# Patient Record
Sex: FEMALE | Race: OTHER | NOT HISPANIC OR LATINO | URBAN - METROPOLITAN AREA
[De-identification: names, ages, dates, MRNs, and addresses within clinical notes are randomized per-mention and may not be internally consistent; named-entity substitution may affect disease eponyms.]

---

## 2025-04-24 ENCOUNTER — INPATIENT (INPATIENT)
Facility: HOSPITAL | Age: 70
LOS: 13 days | Discharge: HOME | DRG: 206 | End: 2025-05-08
Attending: PLASTIC SURGERY | Admitting: PLASTIC SURGERY
Payer: MEDICARE

## 2025-04-24 VITALS — DIASTOLIC BLOOD PRESSURE: 58 MMHG | OXYGEN SATURATION: 100 % | SYSTOLIC BLOOD PRESSURE: 120 MMHG | HEART RATE: 58 BPM

## 2025-04-24 DIAGNOSIS — T27.3XXA BURN OF RESPIRATORY TRACT, PART UNSPECIFIED, INITIAL ENCOUNTER: ICD-10-CM

## 2025-04-24 LAB — GAS PNL BLDA: SIGNIFICANT CHANGE UP

## 2025-04-24 PROCEDURE — 94002 VENT MGMT INPAT INIT DAY: CPT

## 2025-04-24 PROCEDURE — 84132 ASSAY OF SERUM POTASSIUM: CPT

## 2025-04-24 PROCEDURE — 84100 ASSAY OF PHOSPHORUS: CPT

## 2025-04-24 PROCEDURE — 80048 BASIC METABOLIC PNL TOTAL CA: CPT

## 2025-04-24 PROCEDURE — 85610 PROTHROMBIN TIME: CPT

## 2025-04-24 PROCEDURE — 85730 THROMBOPLASTIN TIME PARTIAL: CPT

## 2025-04-24 PROCEDURE — 97116 GAIT TRAINING THERAPY: CPT | Mod: GP

## 2025-04-24 PROCEDURE — 82962 GLUCOSE BLOOD TEST: CPT

## 2025-04-24 PROCEDURE — 94760 N-INVAS EAR/PLS OXIMETRY 1: CPT

## 2025-04-24 PROCEDURE — 82248 BILIRUBIN DIRECT: CPT

## 2025-04-24 PROCEDURE — 84295 ASSAY OF SERUM SODIUM: CPT

## 2025-04-24 PROCEDURE — 80053 COMPREHEN METABOLIC PANEL: CPT

## 2025-04-24 PROCEDURE — 85018 HEMOGLOBIN: CPT

## 2025-04-24 PROCEDURE — 87641 MR-STAPH DNA AMP PROBE: CPT

## 2025-04-24 PROCEDURE — 83036 HEMOGLOBIN GLYCOSYLATED A1C: CPT

## 2025-04-24 PROCEDURE — 83735 ASSAY OF MAGNESIUM: CPT

## 2025-04-24 PROCEDURE — 81001 URINALYSIS AUTO W/SCOPE: CPT

## 2025-04-24 PROCEDURE — 99285 EMERGENCY DEPT VISIT HI MDM: CPT

## 2025-04-24 PROCEDURE — 76705 ECHO EXAM OF ABDOMEN: CPT

## 2025-04-24 PROCEDURE — 82803 BLOOD GASES ANY COMBINATION: CPT

## 2025-04-24 PROCEDURE — 94640 AIRWAY INHALATION TREATMENT: CPT

## 2025-04-24 PROCEDURE — 71045 X-RAY EXAM CHEST 1 VIEW: CPT

## 2025-04-24 PROCEDURE — 97530 THERAPEUTIC ACTIVITIES: CPT | Mod: GP

## 2025-04-24 PROCEDURE — 86901 BLOOD TYPING SEROLOGIC RH(D): CPT

## 2025-04-24 PROCEDURE — 86850 RBC ANTIBODY SCREEN: CPT

## 2025-04-24 PROCEDURE — 86900 BLOOD TYPING SEROLOGIC ABO: CPT

## 2025-04-24 PROCEDURE — 85014 HEMATOCRIT: CPT

## 2025-04-24 PROCEDURE — 82330 ASSAY OF CALCIUM: CPT

## 2025-04-24 PROCEDURE — 94003 VENT MGMT INPAT SUBQ DAY: CPT

## 2025-04-24 PROCEDURE — 93005 ELECTROCARDIOGRAM TRACING: CPT

## 2025-04-24 PROCEDURE — 92526 ORAL FUNCTION THERAPY: CPT | Mod: GN

## 2025-04-24 PROCEDURE — 87640 STAPH A DNA AMP PROBE: CPT

## 2025-04-24 PROCEDURE — 87077 CULTURE AEROBIC IDENTIFY: CPT

## 2025-04-24 PROCEDURE — 87081 CULTURE SCREEN ONLY: CPT

## 2025-04-24 PROCEDURE — 94660 CPAP INITIATION&MGMT: CPT

## 2025-04-24 PROCEDURE — 85025 COMPLETE CBC W/AUTO DIFF WBC: CPT

## 2025-04-24 PROCEDURE — 83605 ASSAY OF LACTIC ACID: CPT

## 2025-04-24 PROCEDURE — 36415 COLL VENOUS BLD VENIPUNCTURE: CPT

## 2025-04-24 PROCEDURE — 87635 SARS-COV-2 COVID-19 AMP PRB: CPT

## 2025-04-24 PROCEDURE — 85027 COMPLETE CBC AUTOMATED: CPT

## 2025-04-24 PROCEDURE — 97162 PT EVAL MOD COMPLEX 30 MIN: CPT | Mod: GP

## 2025-04-24 RX ORDER — LIDOCAINE HCL/PF 10 MG/ML
1 VIAL (ML) INJECTION ONCE
Refills: 0 | Status: COMPLETED | OUTPATIENT
Start: 2025-04-24 | End: 2025-04-25

## 2025-04-24 RX ORDER — BACITRACIN 500 UNIT/G
1 OINTMENT (GRAM) TOPICAL
Refills: 0 | Status: DISCONTINUED | OUTPATIENT
Start: 2025-04-24 | End: 2025-05-08

## 2025-04-24 RX ORDER — ALBUTEROL SULFATE 2.5 MG/3ML
2 VIAL, NEBULIZER (ML) INHALATION EVERY 6 HOURS
Refills: 0 | Status: DISCONTINUED | OUTPATIENT
Start: 2025-04-24 | End: 2025-05-01
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
## 2025-04-24 NOTE — ED ADULT NURSE NOTE - NSFALLHARMRISKINTERV_ED_ALL_ED
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
## 2025-04-24 NOTE — ED ADULT NURSE NOTE - NSSEPSISSUSPECTED_ED_A_ED
"Last Written Prescription Date:  5/4/22  Last Fill Quantity: 90,  # refills: 0   Last office visit provider:  10/12/21     Requested Prescriptions   Pending Prescriptions Disp Refills     losartan-hydrochlorothiazide (HYZAAR) 100-12.5 MG tablet [Pharmacy Med Name: Losartan Potassium-HCTZ 100-12.5 MG Oral Tablet] 90 tablet 0     Sig: Take 1 tablet by mouth once daily       Angiotensin-II Receptors Passed - 8/1/2022 10:26 AM        Passed - Last blood pressure under 140/90 in past 12 months     BP Readings from Last 3 Encounters:   05/20/22 128/69   10/12/21 (!) 160/74   03/04/21 (!) 159/82                 Passed - Recent (12 mo) or future (30 days) visit within the authorizing provider's specialty     Patient has had an office visit with the authorizing provider or a provider within the authorizing providers department within the previous 12 mos or has a future within next 30 days. See \"Patient Info\" tab in inbasket, or \"Choose Columns\" in Meds & Orders section of the refill encounter.              Passed - Medication is active on med list        Passed - Patient is age 18 or older        Passed - No active pregnancy on record        Passed - Normal serum creatinine on file in past 12 months     Recent Labs   Lab Test 05/20/22  1016   CR 0.84       Ok to refill medication if creatinine is low          Passed - Normal serum potassium on file in past 12 months     Recent Labs   Lab Test 05/20/22  1016   POTASSIUM 4.1                    Passed - No positive pregnancy test in past 12 months       Diuretics (Including Combos) Protocol Passed - 8/1/2022 10:26 AM        Passed - Blood pressure under 140/90 in past 12 months     BP Readings from Last 3 Encounters:   05/20/22 128/69   10/12/21 (!) 160/74   03/04/21 (!) 159/82                 Passed - Recent (12 mo) or future (30 days) visit within the authorizing provider's specialty     Patient has had an office visit with the authorizing provider or a provider within the " "authorizing providers department within the previous 12 mos or has a future within next 30 days. See \"Patient Info\" tab in inbasket, or \"Choose Columns\" in Meds & Orders section of the refill encounter.              Passed - Medication is active on med list        Passed - Patient is age 18 or older        Passed - No active pregancy on record        Passed - Normal serum creatinine on file in past 12 months     Recent Labs   Lab Test 05/20/22  1016   CR 0.84              Passed - Normal serum potassium on file in past 12 months     Recent Labs   Lab Test 05/20/22  1016   POTASSIUM 4.1                    Passed - Normal serum sodium on file in past 12 months     Recent Labs   Lab Test 05/20/22  1016                 Passed - No positive pregnancy test in past 12 months             Brittany Grider RN 08/01/22 10:58 PM  " No

## 2025-04-25 LAB
A1C WITH ESTIMATED AVERAGE GLUCOSE RESULT: 5.8 % — HIGH (ref 4–5.6)
ALBUMIN SERPL ELPH-MCNC: 3.2 G/DL — LOW (ref 3.5–5.2)
ALBUMIN SERPL ELPH-MCNC: 3.3 G/DL — LOW (ref 3.5–5.2)
ALBUMIN SERPL ELPH-MCNC: 3.4 G/DL — LOW (ref 3.5–5.2)
ALP SERPL-CCNC: 84 U/L — SIGNIFICANT CHANGE UP (ref 30–115)
ALP SERPL-CCNC: 84 U/L — SIGNIFICANT CHANGE UP (ref 30–115)
ALP SERPL-CCNC: 86 U/L — SIGNIFICANT CHANGE UP (ref 30–115)
ALT FLD-CCNC: 21 U/L — SIGNIFICANT CHANGE UP (ref 0–41)
ALT FLD-CCNC: 22 U/L — SIGNIFICANT CHANGE UP (ref 0–41)
ALT FLD-CCNC: 24 U/L — SIGNIFICANT CHANGE UP (ref 0–41)
ANION GAP SERPL CALC-SCNC: 9 MMOL/L — SIGNIFICANT CHANGE UP (ref 7–14)
APTT BLD: 28.5 SEC — SIGNIFICANT CHANGE UP (ref 27–39.2)
AST SERPL-CCNC: 20 U/L — SIGNIFICANT CHANGE UP (ref 0–41)
AST SERPL-CCNC: 21 U/L — SIGNIFICANT CHANGE UP (ref 0–41)
AST SERPL-CCNC: 30 U/L — SIGNIFICANT CHANGE UP (ref 0–41)
BASOPHILS # BLD AUTO: 0.04 K/UL — SIGNIFICANT CHANGE UP (ref 0–0.2)
BASOPHILS # BLD AUTO: 0.05 K/UL — SIGNIFICANT CHANGE UP (ref 0–0.2)
BASOPHILS # BLD AUTO: 0.08 K/UL — SIGNIFICANT CHANGE UP (ref 0–0.2)
BASOPHILS NFR BLD AUTO: 0.3 % — SIGNIFICANT CHANGE UP (ref 0–1)
BASOPHILS NFR BLD AUTO: 0.4 % — SIGNIFICANT CHANGE UP (ref 0–1)
BASOPHILS NFR BLD AUTO: 0.8 % — SIGNIFICANT CHANGE UP (ref 0–1)
BILIRUB SERPL-MCNC: 0.3 MG/DL — SIGNIFICANT CHANGE UP (ref 0.2–1.2)
BILIRUB SERPL-MCNC: 0.4 MG/DL — SIGNIFICANT CHANGE UP (ref 0.2–1.2)
BILIRUB SERPL-MCNC: <0.2 MG/DL — SIGNIFICANT CHANGE UP (ref 0.2–1.2)
BUN SERPL-MCNC: 11 MG/DL — SIGNIFICANT CHANGE UP (ref 10–20)
BUN SERPL-MCNC: 12 MG/DL — SIGNIFICANT CHANGE UP (ref 10–20)
BUN SERPL-MCNC: 13 MG/DL — SIGNIFICANT CHANGE UP (ref 10–20)
CALCIUM SERPL-MCNC: 8.3 MG/DL — LOW (ref 8.4–10.5)
CALCIUM SERPL-MCNC: 8.6 MG/DL — SIGNIFICANT CHANGE UP (ref 8.4–10.5)
CALCIUM SERPL-MCNC: 8.7 MG/DL — SIGNIFICANT CHANGE UP (ref 8.4–10.5)
CHLORIDE SERPL-SCNC: 103 MMOL/L — SIGNIFICANT CHANGE UP (ref 98–110)
CHLORIDE SERPL-SCNC: 104 MMOL/L — SIGNIFICANT CHANGE UP (ref 98–110)
CHLORIDE SERPL-SCNC: 104 MMOL/L — SIGNIFICANT CHANGE UP (ref 98–110)
CO2 SERPL-SCNC: 25 MMOL/L — SIGNIFICANT CHANGE UP (ref 17–32)
CO2 SERPL-SCNC: 26 MMOL/L — SIGNIFICANT CHANGE UP (ref 17–32)
CO2 SERPL-SCNC: 26 MMOL/L — SIGNIFICANT CHANGE UP (ref 17–32)
CREAT SERPL-MCNC: 0.5 MG/DL — LOW (ref 0.7–1.5)
CREAT SERPL-MCNC: 0.5 MG/DL — LOW (ref 0.7–1.5)
CREAT SERPL-MCNC: 0.6 MG/DL — LOW (ref 0.7–1.5)
EGFR: 102 ML/MIN/1.73M2 — SIGNIFICANT CHANGE UP
EGFR: 98 ML/MIN/1.73M2 — SIGNIFICANT CHANGE UP
EGFR: 98 ML/MIN/1.73M2 — SIGNIFICANT CHANGE UP
EOSINOPHIL # BLD AUTO: 0.1 K/UL — SIGNIFICANT CHANGE UP (ref 0–0.7)
EOSINOPHIL # BLD AUTO: 0.18 K/UL — SIGNIFICANT CHANGE UP (ref 0–0.7)
EOSINOPHIL # BLD AUTO: 0.47 K/UL — SIGNIFICANT CHANGE UP (ref 0–0.7)
EOSINOPHIL NFR BLD AUTO: 0.7 % — SIGNIFICANT CHANGE UP (ref 0–8)
EOSINOPHIL NFR BLD AUTO: 1.8 % — SIGNIFICANT CHANGE UP (ref 0–8)
EOSINOPHIL NFR BLD AUTO: 4.5 % — SIGNIFICANT CHANGE UP (ref 0–8)
ESTIMATED AVERAGE GLUCOSE: 120 MG/DL — HIGH (ref 68–114)
GAS PNL BLDA: SIGNIFICANT CHANGE UP
GAS PNL BLDA: SIGNIFICANT CHANGE UP
GLUCOSE SERPL-MCNC: 101 MG/DL — HIGH (ref 70–99)
GLUCOSE SERPL-MCNC: 111 MG/DL — HIGH (ref 70–99)
GLUCOSE SERPL-MCNC: 99 MG/DL — SIGNIFICANT CHANGE UP (ref 70–99)
HCT VFR BLD CALC: 32.5 % — LOW (ref 37–47)
HCT VFR BLD CALC: 33.1 % — LOW (ref 37–47)
HCT VFR BLD CALC: 33.3 % — LOW (ref 37–47)
HGB BLD-MCNC: 11 G/DL — LOW (ref 12–16)
HGB BLD-MCNC: 11.3 G/DL — LOW (ref 12–16)
HGB BLD-MCNC: 11.7 G/DL — LOW (ref 12–16)
IMM GRANULOCYTES NFR BLD AUTO: 0.4 % — HIGH (ref 0.1–0.3)
IMM GRANULOCYTES NFR BLD AUTO: 0.5 % — HIGH (ref 0.1–0.3)
IMM GRANULOCYTES NFR BLD AUTO: 0.6 % — HIGH (ref 0.1–0.3)
INR BLD: 0.92 RATIO — SIGNIFICANT CHANGE UP (ref 0.65–1.3)
LYMPHOCYTES # BLD AUTO: 0.77 K/UL — LOW (ref 1.2–3.4)
LYMPHOCYTES # BLD AUTO: 1.24 K/UL — SIGNIFICANT CHANGE UP (ref 1.2–3.4)
LYMPHOCYTES # BLD AUTO: 1.52 K/UL — SIGNIFICANT CHANGE UP (ref 1.2–3.4)
LYMPHOCYTES # BLD AUTO: 14.7 % — LOW (ref 20.5–51.1)
LYMPHOCYTES # BLD AUTO: 7.5 % — LOW (ref 20.5–51.1)
LYMPHOCYTES # BLD AUTO: 8.6 % — LOW (ref 20.5–51.1)
MAGNESIUM SERPL-MCNC: 1.9 MG/DL — SIGNIFICANT CHANGE UP (ref 1.8–2.4)
MAGNESIUM SERPL-MCNC: 2 MG/DL — SIGNIFICANT CHANGE UP (ref 1.8–2.4)
MCHC RBC-ENTMCNC: 30.1 PG — SIGNIFICANT CHANGE UP (ref 27–31)
MCHC RBC-ENTMCNC: 30.2 PG — SIGNIFICANT CHANGE UP (ref 27–31)
MCHC RBC-ENTMCNC: 31 PG — SIGNIFICANT CHANGE UP (ref 27–31)
MCHC RBC-ENTMCNC: 33.8 G/DL — SIGNIFICANT CHANGE UP (ref 32–37)
MCHC RBC-ENTMCNC: 33.9 G/DL — SIGNIFICANT CHANGE UP (ref 32–37)
MCHC RBC-ENTMCNC: 35.3 G/DL — SIGNIFICANT CHANGE UP (ref 32–37)
MCV RBC AUTO: 87.8 FL — SIGNIFICANT CHANGE UP (ref 81–99)
MCV RBC AUTO: 88.8 FL — SIGNIFICANT CHANGE UP (ref 81–99)
MCV RBC AUTO: 89.3 FL — SIGNIFICANT CHANGE UP (ref 81–99)
MONOCYTES # BLD AUTO: 0.76 K/UL — HIGH (ref 0.1–0.6)
MONOCYTES # BLD AUTO: 0.79 K/UL — HIGH (ref 0.1–0.6)
MONOCYTES # BLD AUTO: 1.27 K/UL — HIGH (ref 0.1–0.6)
MONOCYTES NFR BLD AUTO: 12.3 % — HIGH (ref 1.7–9.3)
MONOCYTES NFR BLD AUTO: 5.5 % — SIGNIFICANT CHANGE UP (ref 1.7–9.3)
MONOCYTES NFR BLD AUTO: 7.4 % — SIGNIFICANT CHANGE UP (ref 1.7–9.3)
MRSA PCR RESULT.: NEGATIVE — SIGNIFICANT CHANGE UP
NEUTROPHILS # BLD AUTO: 12.11 K/UL — HIGH (ref 1.4–6.5)
NEUTROPHILS # BLD AUTO: 6.94 K/UL — HIGH (ref 1.4–6.5)
NEUTROPHILS # BLD AUTO: 8.49 K/UL — HIGH (ref 1.4–6.5)
NEUTROPHILS NFR BLD AUTO: 67.2 % — SIGNIFICANT CHANGE UP (ref 42.2–75.2)
NEUTROPHILS NFR BLD AUTO: 82.5 % — HIGH (ref 42.2–75.2)
NEUTROPHILS NFR BLD AUTO: 84.3 % — HIGH (ref 42.2–75.2)
NRBC BLD AUTO-RTO: 0 /100 WBCS — SIGNIFICANT CHANGE UP (ref 0–0)
PHOSPHATE SERPL-MCNC: 3.5 MG/DL — SIGNIFICANT CHANGE UP (ref 2.1–4.9)
PLATELET # BLD AUTO: 242 K/UL — SIGNIFICANT CHANGE UP (ref 130–400)
PLATELET # BLD AUTO: 242 K/UL — SIGNIFICANT CHANGE UP (ref 130–400)
PLATELET # BLD AUTO: 263 K/UL — SIGNIFICANT CHANGE UP (ref 130–400)
PMV BLD: 8.6 FL — SIGNIFICANT CHANGE UP (ref 7.4–10.4)
PMV BLD: 8.6 FL — SIGNIFICANT CHANGE UP (ref 7.4–10.4)
PMV BLD: 9.3 FL — SIGNIFICANT CHANGE UP (ref 7.4–10.4)
POTASSIUM SERPL-MCNC: 3.6 MMOL/L — SIGNIFICANT CHANGE UP (ref 3.5–5)
POTASSIUM SERPL-MCNC: 3.7 MMOL/L — SIGNIFICANT CHANGE UP (ref 3.5–5)
POTASSIUM SERPL-MCNC: 4.1 MMOL/L — SIGNIFICANT CHANGE UP (ref 3.5–5)
POTASSIUM SERPL-SCNC: 3.6 MMOL/L — SIGNIFICANT CHANGE UP (ref 3.5–5)
POTASSIUM SERPL-SCNC: 3.7 MMOL/L — SIGNIFICANT CHANGE UP (ref 3.5–5)
POTASSIUM SERPL-SCNC: 4.1 MMOL/L — SIGNIFICANT CHANGE UP (ref 3.5–5)
PROT SERPL-MCNC: 4.8 G/DL — LOW (ref 6–8)
PROT SERPL-MCNC: 5.1 G/DL — LOW (ref 6–8)
PROT SERPL-MCNC: 5.2 G/DL — LOW (ref 6–8)
PROTHROM AB SERPL-ACNC: 10.8 SEC — SIGNIFICANT CHANGE UP (ref 9.95–12.87)
RBC # BLD: 3.64 M/UL — LOW (ref 4.2–5.4)
RBC # BLD: 3.75 M/UL — LOW (ref 4.2–5.4)
RBC # BLD: 3.77 M/UL — LOW (ref 4.2–5.4)
RBC # FLD: 13.2 % — SIGNIFICANT CHANGE UP (ref 11.5–14.5)
SODIUM SERPL-SCNC: 138 MMOL/L — SIGNIFICANT CHANGE UP (ref 135–146)
SODIUM SERPL-SCNC: 138 MMOL/L — SIGNIFICANT CHANGE UP (ref 135–146)
SODIUM SERPL-SCNC: 139 MMOL/L — SIGNIFICANT CHANGE UP (ref 135–146)
WBC # BLD: 10.28 K/UL — SIGNIFICANT CHANGE UP (ref 4.8–10.8)
WBC # BLD: 10.33 K/UL — SIGNIFICANT CHANGE UP (ref 4.8–10.8)
WBC # BLD: 14.37 K/UL — HIGH (ref 4.8–10.8)
WBC # FLD AUTO: 10.28 K/UL — SIGNIFICANT CHANGE UP (ref 4.8–10.8)
WBC # FLD AUTO: 10.33 K/UL — SIGNIFICANT CHANGE UP (ref 4.8–10.8)
WBC # FLD AUTO: 14.37 K/UL — HIGH (ref 4.8–10.8)

## 2025-04-25 PROCEDURE — 71045 X-RAY EXAM CHEST 1 VIEW: CPT | Mod: 26

## 2025-04-25 PROCEDURE — 99291 CRITICAL CARE FIRST HOUR: CPT | Mod: 25

## 2025-04-25 PROCEDURE — 93010 ELECTROCARDIOGRAM REPORT: CPT

## 2025-04-25 PROCEDURE — 99292 CRITICAL CARE ADDL 30 MIN: CPT | Mod: 25

## 2025-04-25 RX ORDER — DIAZEPAM 2 MG/1
2 TABLET ORAL EVERY 12 HOURS
Refills: 0 | Status: DISCONTINUED | OUTPATIENT
Start: 2025-04-28 | End: 2025-04-28

## 2025-04-25 RX ORDER — PROPOFOL 10 MG/ML
10 INJECTION, EMULSION INTRAVENOUS
Qty: 500 | Refills: 0 | Status: DISCONTINUED | OUTPATIENT
Start: 2025-04-25 | End: 2025-04-25

## 2025-04-25 RX ORDER — NOREPINEPHRINE BITARTRATE 8 MG
0.05 SOLUTION INTRAVENOUS
Qty: 8 | Refills: 0 | Status: DISCONTINUED | OUTPATIENT
Start: 2025-04-25 | End: 2025-04-28

## 2025-04-25 RX ORDER — AMPICILLIN SODIUM AND SULBACTAM SODIUM 1; .5 G/1; G/1
3 INJECTION, POWDER, FOR SOLUTION INTRAMUSCULAR; INTRAVENOUS ONCE
Refills: 0 | Status: COMPLETED | OUTPATIENT
Start: 2025-04-25 | End: 2025-04-25

## 2025-04-25 RX ORDER — DIAZEPAM 2 MG/1
5 TABLET ORAL EVERY 6 HOURS
Refills: 0 | Status: DISCONTINUED | OUTPATIENT
Start: 2025-04-25 | End: 2025-04-26

## 2025-04-25 RX ORDER — FOLIC ACID 1 MG/1
1 TABLET ORAL DAILY
Refills: 0 | Status: DISCONTINUED | OUTPATIENT
Start: 2025-04-25 | End: 2025-05-08

## 2025-04-25 RX ORDER — LIDOCAINE HCL/PF 10 MG/ML
40 VIAL (ML) INJECTION ONCE
Refills: 0 | Status: DISCONTINUED | OUTPATIENT
Start: 2025-04-25 | End: 2025-05-08

## 2025-04-25 RX ORDER — SODIUM CHLORIDE 9 G/1000ML
1000 INJECTION, SOLUTION INTRAVENOUS
Refills: 0 | Status: DISCONTINUED | OUTPATIENT
Start: 2025-04-25 | End: 2025-04-29

## 2025-04-25 RX ORDER — DIAZEPAM 2 MG/1
10 TABLET ORAL
Refills: 0 | Status: DISCONTINUED | OUTPATIENT
Start: 2025-04-25 | End: 2025-04-29

## 2025-04-25 RX ORDER — HYPROMELLOSE 0.4 %
1 DROPS OPHTHALMIC (EYE)
Refills: 0 | Status: DISCONTINUED | OUTPATIENT
Start: 2025-04-25 | End: 2025-05-03

## 2025-04-25 RX ORDER — HALOPERIDOL 10 MG/1
10 TABLET ORAL THREE TIMES A DAY
Refills: 0 | Status: DISCONTINUED | OUTPATIENT
Start: 2025-04-25 | End: 2025-04-26

## 2025-04-25 RX ORDER — LORAZEPAM 4 MG/ML
2 VIAL (ML) INJECTION ONCE
Refills: 0 | Status: DISCONTINUED | OUTPATIENT
Start: 2025-04-25 | End: 2025-04-25

## 2025-04-25 RX ORDER — HYPROMELLOSE 0.4 %
1 DROPS OPHTHALMIC (EYE) EVERY 6 HOURS
Refills: 0 | Status: DISCONTINUED | OUTPATIENT
Start: 2025-04-25 | End: 2025-05-03

## 2025-04-25 RX ORDER — B1/B2/B3/B5/B6/B12/VIT C/FOLIC 500-0.5 MG
1 TABLET ORAL DAILY
Refills: 0 | Status: DISCONTINUED | OUTPATIENT
Start: 2025-04-25 | End: 2025-04-30

## 2025-04-25 RX ORDER — DIAZEPAM 2 MG/1
TABLET ORAL
Refills: 0 | Status: COMPLETED | OUTPATIENT
Start: 2025-04-25 | End: 2025-04-28

## 2025-04-25 RX ORDER — FENTANYL CITRATE-0.9 % NACL/PF 100MCG/2ML
0.5 SYRINGE (ML) INTRAVENOUS
Qty: 2500 | Refills: 0 | Status: DISCONTINUED | OUTPATIENT
Start: 2025-04-25 | End: 2025-04-28

## 2025-04-25 RX ORDER — AMPICILLIN SODIUM AND SULBACTAM SODIUM 1; .5 G/1; G/1
INJECTION, POWDER, FOR SOLUTION INTRAMUSCULAR; INTRAVENOUS
Refills: 0 | Status: DISCONTINUED | OUTPATIENT
Start: 2025-04-25 | End: 2025-05-01

## 2025-04-25 RX ORDER — SODIUM CHLORIDE 9 G/1000ML
1000 INJECTION, SOLUTION INTRAVENOUS
Refills: 0 | Status: DISCONTINUED | OUTPATIENT
Start: 2025-04-25 | End: 2025-04-25

## 2025-04-25 RX ORDER — AMPICILLIN SODIUM AND SULBACTAM SODIUM 1; .5 G/1; G/1
3 INJECTION, POWDER, FOR SOLUTION INTRAMUSCULAR; INTRAVENOUS EVERY 6 HOURS
Refills: 0 | Status: DISCONTINUED | OUTPATIENT
Start: 2025-04-25 | End: 2025-05-01

## 2025-04-25 RX ORDER — ENOXAPARIN SODIUM 100 MG/ML
40 INJECTION SUBCUTANEOUS EVERY 24 HOURS
Refills: 0 | Status: DISCONTINUED | OUTPATIENT
Start: 2025-04-25 | End: 2025-05-08

## 2025-04-25 RX ORDER — GABAPENTIN 400 MG/1
1 CAPSULE ORAL
Refills: 0 | DISCHARGE

## 2025-04-25 RX ORDER — DEXMEDETOMIDINE HYDROCHLORIDE IN SODIUM CHLORIDE 4 UG/ML
0.2 INJECTION INTRAVENOUS
Qty: 200 | Refills: 0 | Status: DISCONTINUED | OUTPATIENT
Start: 2025-04-25 | End: 2025-04-25

## 2025-04-25 RX ORDER — DIAZEPAM 2 MG/1
5 TABLET ORAL EVERY 8 HOURS
Refills: 0 | Status: DISCONTINUED | OUTPATIENT
Start: 2025-04-26 | End: 2025-04-27

## 2025-04-25 RX ORDER — PROPOFOL 10 MG/ML
10 INJECTION, EMULSION INTRAVENOUS
Qty: 1000 | Refills: 0 | Status: DISCONTINUED | OUTPATIENT
Start: 2025-04-25 | End: 2025-04-28

## 2025-04-25 RX ORDER — ACETAMINOPHEN 500 MG/5ML
650 LIQUID (ML) ORAL EVERY 6 HOURS
Refills: 0 | Status: DISCONTINUED | OUTPATIENT
Start: 2025-04-25 | End: 2025-05-08

## 2025-04-25 RX ADMIN — PROPOFOL 4.2 MICROGRAM(S)/KG/MIN: 10 INJECTION, EMULSION INTRAVENOUS at 03:20

## 2025-04-25 RX ADMIN — Medication 650 MILLIGRAM(S): at 06:03

## 2025-04-25 RX ADMIN — Medication 2 PUFF(S): at 20:04

## 2025-04-25 RX ADMIN — Medication 15 MILLILITER(S): at 17:17

## 2025-04-25 RX ADMIN — Medication 1 DROP(S): at 05:49

## 2025-04-25 RX ADMIN — Medication 1 APPLICATION(S): at 17:15

## 2025-04-25 RX ADMIN — PROPOFOL 4.49 MICROGRAM(S)/KG/MIN: 10 INJECTION, EMULSION INTRAVENOUS at 22:15

## 2025-04-25 RX ADMIN — Medication 3.5 MICROGRAM(S)/KG/HR: at 00:11

## 2025-04-25 RX ADMIN — PROPOFOL 4.49 MICROGRAM(S)/KG/MIN: 10 INJECTION, EMULSION INTRAVENOUS at 05:53

## 2025-04-25 RX ADMIN — PROPOFOL 4.49 MICROGRAM(S)/KG/MIN: 10 INJECTION, EMULSION INTRAVENOUS at 10:32

## 2025-04-25 RX ADMIN — AMPICILLIN SODIUM AND SULBACTAM SODIUM 200 GRAM(S): 1; .5 INJECTION, POWDER, FOR SOLUTION INTRAMUSCULAR; INTRAVENOUS at 11:34

## 2025-04-25 RX ADMIN — AMPICILLIN SODIUM AND SULBACTAM SODIUM 200 GRAM(S): 1; .5 INJECTION, POWDER, FOR SOLUTION INTRAMUSCULAR; INTRAVENOUS at 23:56

## 2025-04-25 RX ADMIN — DIAZEPAM 5 MILLIGRAM(S): 2 TABLET ORAL at 18:44

## 2025-04-25 RX ADMIN — HALOPERIDOL 10 MILLIGRAM(S): 10 TABLET ORAL at 22:18

## 2025-04-25 RX ADMIN — Medication 1 TABLET(S): at 19:47

## 2025-04-25 RX ADMIN — DIAZEPAM 5 MILLIGRAM(S): 2 TABLET ORAL at 23:57

## 2025-04-25 RX ADMIN — Medication 2 PUFF(S): at 13:22

## 2025-04-25 RX ADMIN — Medication 1 DROP(S): at 23:58

## 2025-04-25 RX ADMIN — AMPICILLIN SODIUM AND SULBACTAM SODIUM 200 GRAM(S): 1; .5 INJECTION, POWDER, FOR SOLUTION INTRAMUSCULAR; INTRAVENOUS at 05:50

## 2025-04-25 RX ADMIN — Medication 1 APPLICATION(S): at 05:50

## 2025-04-25 RX ADMIN — SODIUM CHLORIDE 75 MILLILITER(S): 9 INJECTION, SOLUTION INTRAVENOUS at 05:50

## 2025-04-25 RX ADMIN — Medication 1 DROP(S): at 17:14

## 2025-04-25 RX ADMIN — Medication 1 APPLICATION(S): at 05:49

## 2025-04-25 RX ADMIN — AMPICILLIN SODIUM AND SULBACTAM SODIUM 200 GRAM(S): 1; .5 INJECTION, POWDER, FOR SOLUTION INTRAMUSCULAR; INTRAVENOUS at 17:15

## 2025-04-25 RX ADMIN — Medication 2 MILLIGRAM(S): at 17:14

## 2025-04-25 RX ADMIN — ENOXAPARIN SODIUM 40 MILLIGRAM(S): 100 INJECTION SUBCUTANEOUS at 05:50

## 2025-04-25 RX ADMIN — Medication 650 MILLIGRAM(S): at 07:05

## 2025-04-25 RX ADMIN — Medication 1 VIAL(S): at 14:00

## 2025-04-25 RX ADMIN — PROPOFOL 4.49 MICROGRAM(S)/KG/MIN: 10 INJECTION, EMULSION INTRAVENOUS at 23:53

## 2025-04-25 RX ADMIN — Medication 1 DROP(S): at 11:35

## 2025-04-25 RX ADMIN — AMPICILLIN SODIUM AND SULBACTAM SODIUM 200 GRAM(S): 1; .5 INJECTION, POWDER, FOR SOLUTION INTRAMUSCULAR; INTRAVENOUS at 01:42

## 2025-04-25 RX ADMIN — PROPOFOL 4.49 MICROGRAM(S)/KG/MIN: 10 INJECTION, EMULSION INTRAVENOUS at 17:14

## 2025-04-25 RX ADMIN — Medication 1 PUFF(S): at 07:44

## 2025-04-25 RX ADMIN — PROPOFOL 4.49 MICROGRAM(S)/KG/MIN: 10 INJECTION, EMULSION INTRAVENOUS at 14:51

## 2025-04-25 RX ADMIN — Medication 15 MILLILITER(S): at 05:49

## 2025-04-25 RX ADMIN — Medication 1 PUFF(S): at 13:22

## 2025-04-25 RX ADMIN — FOLIC ACID 1 MILLIGRAM(S): 1 TABLET ORAL at 19:48

## 2025-04-25 RX ADMIN — SODIUM CHLORIDE 75 MILLILITER(S): 9 INJECTION, SOLUTION INTRAVENOUS at 10:32

## 2025-04-25 RX ADMIN — Medication 3.5 MICROGRAM(S)/KG/HR: at 23:53

## 2025-04-25 RX ADMIN — Medication 1 PUFF(S): at 20:03

## 2025-04-25 RX ADMIN — Medication 1 APPLICATION(S): at 17:14

## 2025-04-25 RX ADMIN — Medication 2 PUFF(S): at 07:44
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
## 2025-04-25 NOTE — PATIENT PROFILE ADULT - FALL HARM RISK - HARM RISK INTERVENTIONS

## 2025-04-25 NOTE — H&P ADULT - NSHPLABSRESULTS_GEN_ALL_CORE
LABS:                        11.7   14.37 )-----------( 242      ( 24 Apr 2025 23:08 )             33.1     24 Apr 2025 23:08    139    |  104    |  13     ----------------------------<  101    4.1     |  26     |  0.6      Ca    8.3        24 Apr 2025 23:08    TPro  5.1    /  Alb  3.3    /  TBili  <0.2   /  DBili  x      /  AST  30     /  ALT  24     /  AlkPhos  84     24 Apr 2025 23:08      Vital Signs Last 24 Hrs    HR: 64 (24 Apr 2025 23:25) (58 - 64)  BP: 113/57 (24 Apr 2025 23:25) (109/56 - 130/60)  BP(mean): --  SpO2: 100% (24 Apr 2025 23:25) (100% - 100%)    Parameters below as of 24 Apr 2025 23:25  Patient On (Oxygen Delivery Method): ventilator

## 2025-04-26 LAB
ALBUMIN SERPL ELPH-MCNC: 2.9 G/DL — LOW (ref 3.5–5.2)
ALP SERPL-CCNC: 76 U/L — SIGNIFICANT CHANGE UP (ref 30–115)
ALT FLD-CCNC: 26 U/L — SIGNIFICANT CHANGE UP (ref 0–41)
ANION GAP SERPL CALC-SCNC: 7 MMOL/L — SIGNIFICANT CHANGE UP (ref 7–14)
ANION GAP SERPL CALC-SCNC: 9 MMOL/L — SIGNIFICANT CHANGE UP (ref 7–14)
AST SERPL-CCNC: 53 U/L — HIGH (ref 0–41)
BASOPHILS # BLD AUTO: 0.04 K/UL — SIGNIFICANT CHANGE UP (ref 0–0.2)
BASOPHILS # BLD AUTO: 0.05 K/UL — SIGNIFICANT CHANGE UP (ref 0–0.2)
BASOPHILS NFR BLD AUTO: 0.5 % — SIGNIFICANT CHANGE UP (ref 0–1)
BASOPHILS NFR BLD AUTO: 0.5 % — SIGNIFICANT CHANGE UP (ref 0–1)
BILIRUB SERPL-MCNC: 0.2 MG/DL — SIGNIFICANT CHANGE UP (ref 0.2–1.2)
BLD GP AB SCN SERPL QL: SIGNIFICANT CHANGE UP
BUN SERPL-MCNC: 10 MG/DL — SIGNIFICANT CHANGE UP (ref 10–20)
BUN SERPL-MCNC: 9 MG/DL — LOW (ref 10–20)
CALCIUM SERPL-MCNC: 8.3 MG/DL — LOW (ref 8.4–10.5)
CALCIUM SERPL-MCNC: 8.3 MG/DL — LOW (ref 8.4–10.5)
CHLORIDE SERPL-SCNC: 102 MMOL/L — SIGNIFICANT CHANGE UP (ref 98–110)
CHLORIDE SERPL-SCNC: 103 MMOL/L — SIGNIFICANT CHANGE UP (ref 98–110)
CO2 SERPL-SCNC: 26 MMOL/L — SIGNIFICANT CHANGE UP (ref 17–32)
CO2 SERPL-SCNC: 28 MMOL/L — SIGNIFICANT CHANGE UP (ref 17–32)
CREAT SERPL-MCNC: <0.5 MG/DL — LOW (ref 0.7–1.5)
CREAT SERPL-MCNC: <0.5 MG/DL — LOW (ref 0.7–1.5)
EGFR: 108 ML/MIN/1.73M2 — SIGNIFICANT CHANGE UP
EOSINOPHIL # BLD AUTO: 0.38 K/UL — SIGNIFICANT CHANGE UP (ref 0–0.7)
EOSINOPHIL # BLD AUTO: 0.4 K/UL — SIGNIFICANT CHANGE UP (ref 0–0.7)
EOSINOPHIL NFR BLD AUTO: 4 % — SIGNIFICANT CHANGE UP (ref 0–8)
EOSINOPHIL NFR BLD AUTO: 5 % — SIGNIFICANT CHANGE UP (ref 0–8)
GAS PNL BLDA: SIGNIFICANT CHANGE UP
GAS PNL BLDA: SIGNIFICANT CHANGE UP
GLUCOSE SERPL-MCNC: 113 MG/DL — HIGH (ref 70–99)
GLUCOSE SERPL-MCNC: 98 MG/DL — SIGNIFICANT CHANGE UP (ref 70–99)
HCT VFR BLD CALC: 30.1 % — LOW (ref 37–47)
HCT VFR BLD CALC: 30.6 % — LOW (ref 37–47)
HGB BLD-MCNC: 10.1 G/DL — LOW (ref 12–16)
HGB BLD-MCNC: 10.5 G/DL — LOW (ref 12–16)
IMM GRANULOCYTES NFR BLD AUTO: 0.3 % — SIGNIFICANT CHANGE UP (ref 0.1–0.3)
IMM GRANULOCYTES NFR BLD AUTO: 0.4 % — HIGH (ref 0.1–0.3)
LYMPHOCYTES # BLD AUTO: 1.32 K/UL — SIGNIFICANT CHANGE UP (ref 1.2–3.4)
LYMPHOCYTES # BLD AUTO: 1.34 K/UL — SIGNIFICANT CHANGE UP (ref 1.2–3.4)
LYMPHOCYTES # BLD AUTO: 13.8 % — LOW (ref 20.5–51.1)
LYMPHOCYTES # BLD AUTO: 16.9 % — LOW (ref 20.5–51.1)
MAGNESIUM SERPL-MCNC: 1.9 MG/DL — SIGNIFICANT CHANGE UP (ref 1.8–2.4)
MAGNESIUM SERPL-MCNC: 2 MG/DL — SIGNIFICANT CHANGE UP (ref 1.8–2.4)
MCHC RBC-ENTMCNC: 29.6 PG — SIGNIFICANT CHANGE UP (ref 27–31)
MCHC RBC-ENTMCNC: 30.2 PG — SIGNIFICANT CHANGE UP (ref 27–31)
MCHC RBC-ENTMCNC: 33.6 G/DL — SIGNIFICANT CHANGE UP (ref 32–37)
MCHC RBC-ENTMCNC: 34.3 G/DL — SIGNIFICANT CHANGE UP (ref 32–37)
MCV RBC AUTO: 87.9 FL — SIGNIFICANT CHANGE UP (ref 81–99)
MCV RBC AUTO: 88.3 FL — SIGNIFICANT CHANGE UP (ref 81–99)
MONOCYTES # BLD AUTO: 0.79 K/UL — HIGH (ref 0.1–0.6)
MONOCYTES # BLD AUTO: 1.25 K/UL — HIGH (ref 0.1–0.6)
MONOCYTES NFR BLD AUTO: 10 % — HIGH (ref 1.7–9.3)
MONOCYTES NFR BLD AUTO: 13 % — HIGH (ref 1.7–9.3)
NEUTROPHILS # BLD AUTO: 5.34 K/UL — SIGNIFICANT CHANGE UP (ref 1.4–6.5)
NEUTROPHILS # BLD AUTO: 6.56 K/UL — HIGH (ref 1.4–6.5)
NEUTROPHILS NFR BLD AUTO: 67.3 % — SIGNIFICANT CHANGE UP (ref 42.2–75.2)
NEUTROPHILS NFR BLD AUTO: 68.3 % — SIGNIFICANT CHANGE UP (ref 42.2–75.2)
NRBC BLD AUTO-RTO: 0 /100 WBCS — SIGNIFICANT CHANGE UP (ref 0–0)
NRBC BLD AUTO-RTO: 0 /100 WBCS — SIGNIFICANT CHANGE UP (ref 0–0)
PHOSPHATE SERPL-MCNC: 3 MG/DL — SIGNIFICANT CHANGE UP (ref 2.1–4.9)
PHOSPHATE SERPL-MCNC: 3.6 MG/DL — SIGNIFICANT CHANGE UP (ref 2.1–4.9)
PLATELET # BLD AUTO: 206 K/UL — SIGNIFICANT CHANGE UP (ref 130–400)
PLATELET # BLD AUTO: 210 K/UL — SIGNIFICANT CHANGE UP (ref 130–400)
PMV BLD: 8.6 FL — SIGNIFICANT CHANGE UP (ref 7.4–10.4)
PMV BLD: 9.2 FL — SIGNIFICANT CHANGE UP (ref 7.4–10.4)
POTASSIUM SERPL-MCNC: 3.6 MMOL/L — SIGNIFICANT CHANGE UP (ref 3.5–5)
POTASSIUM SERPL-MCNC: 3.6 MMOL/L — SIGNIFICANT CHANGE UP (ref 3.5–5)
POTASSIUM SERPL-SCNC: 3.6 MMOL/L — SIGNIFICANT CHANGE UP (ref 3.5–5)
POTASSIUM SERPL-SCNC: 3.6 MMOL/L — SIGNIFICANT CHANGE UP (ref 3.5–5)
PROT SERPL-MCNC: 4.8 G/DL — LOW (ref 6–8)
RBC # BLD: 3.41 M/UL — LOW (ref 4.2–5.4)
RBC # BLD: 3.48 M/UL — LOW (ref 4.2–5.4)
RBC # FLD: 13.1 % — SIGNIFICANT CHANGE UP (ref 11.5–14.5)
RBC # FLD: 13.3 % — SIGNIFICANT CHANGE UP (ref 11.5–14.5)
SODIUM SERPL-SCNC: 137 MMOL/L — SIGNIFICANT CHANGE UP (ref 135–146)
SODIUM SERPL-SCNC: 138 MMOL/L — SIGNIFICANT CHANGE UP (ref 135–146)
WBC # BLD: 7.93 K/UL — SIGNIFICANT CHANGE UP (ref 4.8–10.8)
WBC # BLD: 9.6 K/UL — SIGNIFICANT CHANGE UP (ref 4.8–10.8)
WBC # FLD AUTO: 7.93 K/UL — SIGNIFICANT CHANGE UP (ref 4.8–10.8)
WBC # FLD AUTO: 9.6 K/UL — SIGNIFICANT CHANGE UP (ref 4.8–10.8)

## 2025-04-26 PROCEDURE — 99292 CRITICAL CARE ADDL 30 MIN: CPT | Mod: 25

## 2025-04-26 PROCEDURE — 71045 X-RAY EXAM CHEST 1 VIEW: CPT | Mod: 26,77

## 2025-04-26 PROCEDURE — 93010 ELECTROCARDIOGRAM REPORT: CPT

## 2025-04-26 PROCEDURE — 36620 INSERTION CATHETER ARTERY: CPT | Mod: RT,GC

## 2025-04-26 PROCEDURE — 31646 BRNCHSC W/THER ASPIR SBSQ: CPT

## 2025-04-26 PROCEDURE — 71045 X-RAY EXAM CHEST 1 VIEW: CPT | Mod: 26

## 2025-04-26 PROCEDURE — 99291 CRITICAL CARE FIRST HOUR: CPT | Mod: 25

## 2025-04-26 RX ORDER — HALOPERIDOL 10 MG/1
5 TABLET ORAL EVERY 8 HOURS
Refills: 0 | Status: DISCONTINUED | OUTPATIENT
Start: 2025-04-27 | End: 2025-04-28

## 2025-04-26 RX ADMIN — AMPICILLIN SODIUM AND SULBACTAM SODIUM 200 GRAM(S): 1; .5 INJECTION, POWDER, FOR SOLUTION INTRAMUSCULAR; INTRAVENOUS at 17:46

## 2025-04-26 RX ADMIN — DIAZEPAM 5 MILLIGRAM(S): 2 TABLET ORAL at 05:08

## 2025-04-26 RX ADMIN — PROPOFOL 4.49 MICROGRAM(S)/KG/MIN: 10 INJECTION, EMULSION INTRAVENOUS at 08:08

## 2025-04-26 RX ADMIN — Medication 1 PUFF(S): at 09:04

## 2025-04-26 RX ADMIN — Medication 1 APPLICATION(S): at 05:08

## 2025-04-26 RX ADMIN — DIAZEPAM 5 MILLIGRAM(S): 2 TABLET ORAL at 22:22

## 2025-04-26 RX ADMIN — AMPICILLIN SODIUM AND SULBACTAM SODIUM 200 GRAM(S): 1; .5 INJECTION, POWDER, FOR SOLUTION INTRAMUSCULAR; INTRAVENOUS at 23:09

## 2025-04-26 RX ADMIN — PROPOFOL 4.49 MICROGRAM(S)/KG/MIN: 10 INJECTION, EMULSION INTRAVENOUS at 18:03

## 2025-04-26 RX ADMIN — Medication 1 APPLICATION(S): at 05:09

## 2025-04-26 RX ADMIN — Medication 2 PUFF(S): at 18:08

## 2025-04-26 RX ADMIN — Medication 1 PUFF(S): at 18:08

## 2025-04-26 RX ADMIN — Medication 15 MILLILITER(S): at 17:47

## 2025-04-26 RX ADMIN — HALOPERIDOL 10 MILLIGRAM(S): 10 TABLET ORAL at 17:47

## 2025-04-26 RX ADMIN — HALOPERIDOL 10 MILLIGRAM(S): 10 TABLET ORAL at 22:23

## 2025-04-26 RX ADMIN — Medication 2 PUFF(S): at 09:04

## 2025-04-26 RX ADMIN — AMPICILLIN SODIUM AND SULBACTAM SODIUM 200 GRAM(S): 1; .5 INJECTION, POWDER, FOR SOLUTION INTRAMUSCULAR; INTRAVENOUS at 05:07

## 2025-04-26 RX ADMIN — Medication 1 APPLICATION(S): at 17:48

## 2025-04-26 RX ADMIN — Medication 650 MILLIGRAM(S): at 06:01

## 2025-04-26 RX ADMIN — Medication 1 DROP(S): at 23:10

## 2025-04-26 RX ADMIN — Medication 650 MILLIGRAM(S): at 07:00

## 2025-04-26 RX ADMIN — AMPICILLIN SODIUM AND SULBACTAM SODIUM 200 GRAM(S): 1; .5 INJECTION, POWDER, FOR SOLUTION INTRAMUSCULAR; INTRAVENOUS at 11:45

## 2025-04-26 RX ADMIN — HALOPERIDOL 10 MILLIGRAM(S): 10 TABLET ORAL at 05:07

## 2025-04-26 RX ADMIN — Medication 1 DROP(S): at 05:10

## 2025-04-26 RX ADMIN — Medication 1 PUFF(S): at 19:25

## 2025-04-26 RX ADMIN — Medication 1 PUFF(S): at 02:42

## 2025-04-26 RX ADMIN — Medication 1 TABLET(S): at 11:46

## 2025-04-26 RX ADMIN — Medication 100 MILLIGRAM(S): at 11:46

## 2025-04-26 RX ADMIN — Medication 1 APPLICATION(S): at 17:50

## 2025-04-26 RX ADMIN — PROPOFOL 4.49 MICROGRAM(S)/KG/MIN: 10 INJECTION, EMULSION INTRAVENOUS at 22:45

## 2025-04-26 RX ADMIN — Medication 1 DROP(S): at 17:48

## 2025-04-26 RX ADMIN — FOLIC ACID 1 MILLIGRAM(S): 1 TABLET ORAL at 11:46

## 2025-04-26 RX ADMIN — Medication 2 PUFF(S): at 19:24

## 2025-04-26 RX ADMIN — Medication 1 APPLICATION(S): at 05:07

## 2025-04-26 RX ADMIN — Medication 1 DROP(S): at 13:17

## 2025-04-26 RX ADMIN — Medication 15 MILLILITER(S): at 05:08

## 2025-04-26 RX ADMIN — ENOXAPARIN SODIUM 40 MILLIGRAM(S): 100 INJECTION SUBCUTANEOUS at 05:07

## 2025-04-26 RX ADMIN — Medication 2 PUFF(S): at 02:42

## 2025-04-26 RX ADMIN — DIAZEPAM 5 MILLIGRAM(S): 2 TABLET ORAL at 11:46
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
## 2025-04-26 NOTE — PROCEDURE NOTE - NSINDICATIONS_GEN_A_CORE
critical patient/monitoring purposes
arterial puncture to obtain ABG's/critical patient/monitoring purposes
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
## 2025-04-26 NOTE — PROCEDURE NOTE - NSSITEPREP_SKIN_A_CORE
alcohol/chlorhexidine/Adherence to aseptic technique: hand hygiene prior to donning barriers (gown, gloves), don cap and mask, sterile drape over patient
chlorhexidine

## 2025-04-27 LAB
ALBUMIN SERPL ELPH-MCNC: 2.7 G/DL — LOW (ref 3.5–5.2)
ALP SERPL-CCNC: 89 U/L — SIGNIFICANT CHANGE UP (ref 30–115)
ALT FLD-CCNC: 38 U/L — SIGNIFICANT CHANGE UP (ref 0–41)
ANION GAP SERPL CALC-SCNC: 6 MMOL/L — LOW (ref 7–14)
ANION GAP SERPL CALC-SCNC: 7 MMOL/L — SIGNIFICANT CHANGE UP (ref 7–14)
AST SERPL-CCNC: 67 U/L — HIGH (ref 0–41)
BASOPHILS # BLD AUTO: 0.03 K/UL — SIGNIFICANT CHANGE UP (ref 0–0.2)
BASOPHILS # BLD AUTO: 0.04 K/UL — SIGNIFICANT CHANGE UP (ref 0–0.2)
BASOPHILS NFR BLD AUTO: 0.4 % — SIGNIFICANT CHANGE UP (ref 0–1)
BASOPHILS NFR BLD AUTO: 0.5 % — SIGNIFICANT CHANGE UP (ref 0–1)
BILIRUB SERPL-MCNC: <0.2 MG/DL — SIGNIFICANT CHANGE UP (ref 0.2–1.2)
BUN SERPL-MCNC: 8 MG/DL — LOW (ref 10–20)
BUN SERPL-MCNC: 9 MG/DL — LOW (ref 10–20)
CALCIUM SERPL-MCNC: 8 MG/DL — LOW (ref 8.4–10.5)
CALCIUM SERPL-MCNC: 8.2 MG/DL — LOW (ref 8.4–10.5)
CHLORIDE SERPL-SCNC: 106 MMOL/L — SIGNIFICANT CHANGE UP (ref 98–110)
CHLORIDE SERPL-SCNC: 109 MMOL/L — SIGNIFICANT CHANGE UP (ref 98–110)
CO2 SERPL-SCNC: 26 MMOL/L — SIGNIFICANT CHANGE UP (ref 17–32)
CO2 SERPL-SCNC: 27 MMOL/L — SIGNIFICANT CHANGE UP (ref 17–32)
CREAT SERPL-MCNC: <0.5 MG/DL — LOW (ref 0.7–1.5)
CREAT SERPL-MCNC: <0.5 MG/DL — LOW (ref 0.7–1.5)
EGFR: 115 ML/MIN/1.73M2 — SIGNIFICANT CHANGE UP
EOSINOPHIL # BLD AUTO: 0.45 K/UL — SIGNIFICANT CHANGE UP (ref 0–0.7)
EOSINOPHIL # BLD AUTO: 0.56 K/UL — SIGNIFICANT CHANGE UP (ref 0–0.7)
EOSINOPHIL NFR BLD AUTO: 5.6 % — SIGNIFICANT CHANGE UP (ref 0–8)
EOSINOPHIL NFR BLD AUTO: 7 % — SIGNIFICANT CHANGE UP (ref 0–8)
GAS PNL BLDA: SIGNIFICANT CHANGE UP
GAS PNL BLDA: SIGNIFICANT CHANGE UP
GLUCOSE BLDC GLUCOMTR-MCNC: 113 MG/DL — HIGH (ref 70–99)
GLUCOSE BLDC GLUCOMTR-MCNC: 153 MG/DL — HIGH (ref 70–99)
GLUCOSE SERPL-MCNC: 150 MG/DL — HIGH (ref 70–99)
GLUCOSE SERPL-MCNC: 150 MG/DL — HIGH (ref 70–99)
HCT VFR BLD CALC: 28.7 % — LOW (ref 37–47)
HCT VFR BLD CALC: 29.5 % — LOW (ref 37–47)
HGB BLD-MCNC: 10 G/DL — LOW (ref 12–16)
HGB BLD-MCNC: 9.9 G/DL — LOW (ref 12–16)
IMM GRANULOCYTES NFR BLD AUTO: 0.5 % — HIGH (ref 0.1–0.3)
IMM GRANULOCYTES NFR BLD AUTO: 0.6 % — HIGH (ref 0.1–0.3)
LYMPHOCYTES # BLD AUTO: 1.38 K/UL — SIGNIFICANT CHANGE UP (ref 1.2–3.4)
LYMPHOCYTES # BLD AUTO: 1.55 K/UL — SIGNIFICANT CHANGE UP (ref 1.2–3.4)
LYMPHOCYTES # BLD AUTO: 17.1 % — LOW (ref 20.5–51.1)
LYMPHOCYTES # BLD AUTO: 19.4 % — LOW (ref 20.5–51.1)
MAGNESIUM SERPL-MCNC: 1.7 MG/DL — LOW (ref 1.8–2.4)
MAGNESIUM SERPL-MCNC: 1.9 MG/DL — SIGNIFICANT CHANGE UP (ref 1.8–2.4)
MCHC RBC-ENTMCNC: 29.9 PG — SIGNIFICANT CHANGE UP (ref 27–31)
MCHC RBC-ENTMCNC: 29.9 PG — SIGNIFICANT CHANGE UP (ref 27–31)
MCHC RBC-ENTMCNC: 33.9 G/DL — SIGNIFICANT CHANGE UP (ref 32–37)
MCHC RBC-ENTMCNC: 34.5 G/DL — SIGNIFICANT CHANGE UP (ref 32–37)
MCV RBC AUTO: 86.7 FL — SIGNIFICANT CHANGE UP (ref 81–99)
MCV RBC AUTO: 88.1 FL — SIGNIFICANT CHANGE UP (ref 81–99)
MONOCYTES # BLD AUTO: 0.79 K/UL — HIGH (ref 0.1–0.6)
MONOCYTES # BLD AUTO: 0.82 K/UL — HIGH (ref 0.1–0.6)
MONOCYTES NFR BLD AUTO: 10.2 % — HIGH (ref 1.7–9.3)
MONOCYTES NFR BLD AUTO: 9.8 % — HIGH (ref 1.7–9.3)
NEUTROPHILS # BLD AUTO: 5.01 K/UL — SIGNIFICANT CHANGE UP (ref 1.4–6.5)
NEUTROPHILS # BLD AUTO: 5.35 K/UL — SIGNIFICANT CHANGE UP (ref 1.4–6.5)
NEUTROPHILS NFR BLD AUTO: 62.5 % — SIGNIFICANT CHANGE UP (ref 42.2–75.2)
NEUTROPHILS NFR BLD AUTO: 66.4 % — SIGNIFICANT CHANGE UP (ref 42.2–75.2)
NRBC BLD AUTO-RTO: 0 /100 WBCS — SIGNIFICANT CHANGE UP (ref 0–0)
NRBC BLD AUTO-RTO: 0 /100 WBCS — SIGNIFICANT CHANGE UP (ref 0–0)
PHOSPHATE SERPL-MCNC: 2.5 MG/DL — SIGNIFICANT CHANGE UP (ref 2.1–4.9)
PHOSPHATE SERPL-MCNC: 2.8 MG/DL — SIGNIFICANT CHANGE UP (ref 2.1–4.9)
PLATELET # BLD AUTO: 188 K/UL — SIGNIFICANT CHANGE UP (ref 130–400)
PLATELET # BLD AUTO: 196 K/UL — SIGNIFICANT CHANGE UP (ref 130–400)
PMV BLD: 8.9 FL — SIGNIFICANT CHANGE UP (ref 7.4–10.4)
PMV BLD: 9.2 FL — SIGNIFICANT CHANGE UP (ref 7.4–10.4)
POTASSIUM SERPL-MCNC: 3.3 MMOL/L — LOW (ref 3.5–5)
POTASSIUM SERPL-MCNC: 4.2 MMOL/L — SIGNIFICANT CHANGE UP (ref 3.5–5)
POTASSIUM SERPL-SCNC: 3.3 MMOL/L — LOW (ref 3.5–5)
POTASSIUM SERPL-SCNC: 4.2 MMOL/L — SIGNIFICANT CHANGE UP (ref 3.5–5)
PROT SERPL-MCNC: 4.5 G/DL — LOW (ref 6–8)
RBC # BLD: 3.31 M/UL — LOW (ref 4.2–5.4)
RBC # BLD: 3.35 M/UL — LOW (ref 4.2–5.4)
RBC # FLD: 13.1 % — SIGNIFICANT CHANGE UP (ref 11.5–14.5)
RBC # FLD: 13.3 % — SIGNIFICANT CHANGE UP (ref 11.5–14.5)
SODIUM SERPL-SCNC: 140 MMOL/L — SIGNIFICANT CHANGE UP (ref 135–146)
SODIUM SERPL-SCNC: 141 MMOL/L — SIGNIFICANT CHANGE UP (ref 135–146)
WBC # BLD: 8.01 K/UL — SIGNIFICANT CHANGE UP (ref 4.8–10.8)
WBC # BLD: 8.06 K/UL — SIGNIFICANT CHANGE UP (ref 4.8–10.8)
WBC # FLD AUTO: 8.01 K/UL — SIGNIFICANT CHANGE UP (ref 4.8–10.8)
WBC # FLD AUTO: 8.06 K/UL — SIGNIFICANT CHANGE UP (ref 4.8–10.8)

## 2025-04-27 PROCEDURE — 99291 CRITICAL CARE FIRST HOUR: CPT

## 2025-04-27 PROCEDURE — 99292 CRITICAL CARE ADDL 30 MIN: CPT

## 2025-04-27 PROCEDURE — 71045 X-RAY EXAM CHEST 1 VIEW: CPT | Mod: 26

## 2025-04-27 RX ORDER — MAGNESIUM SULFATE 500 MG/ML
2 SYRINGE (ML) INJECTION ONCE
Refills: 0 | Status: COMPLETED | OUTPATIENT
Start: 2025-04-27 | End: 2025-04-27

## 2025-04-27 RX ADMIN — Medication 1 APPLICATION(S): at 05:19

## 2025-04-27 RX ADMIN — PROPOFOL 4.49 MICROGRAM(S)/KG/MIN: 10 INJECTION, EMULSION INTRAVENOUS at 17:28

## 2025-04-27 RX ADMIN — Medication 2 PUFF(S): at 08:13

## 2025-04-27 RX ADMIN — Medication 25 GRAM(S): at 22:19

## 2025-04-27 RX ADMIN — Medication 1 DROP(S): at 23:25

## 2025-04-27 RX ADMIN — DIAZEPAM 5 MILLIGRAM(S): 2 TABLET ORAL at 13:27

## 2025-04-27 RX ADMIN — DIAZEPAM 5 MILLIGRAM(S): 2 TABLET ORAL at 05:18

## 2025-04-27 RX ADMIN — Medication 2 PUFF(S): at 16:43

## 2025-04-27 RX ADMIN — Medication 100 MILLIEQUIVALENT(S): at 09:05

## 2025-04-27 RX ADMIN — AMPICILLIN SODIUM AND SULBACTAM SODIUM 200 GRAM(S): 1; .5 INJECTION, POWDER, FOR SOLUTION INTRAMUSCULAR; INTRAVENOUS at 17:29

## 2025-04-27 RX ADMIN — FOLIC ACID 1 MILLIGRAM(S): 1 TABLET ORAL at 12:05

## 2025-04-27 RX ADMIN — HALOPERIDOL 5 MILLIGRAM(S): 10 TABLET ORAL at 13:27

## 2025-04-27 RX ADMIN — AMPICILLIN SODIUM AND SULBACTAM SODIUM 200 GRAM(S): 1; .5 INJECTION, POWDER, FOR SOLUTION INTRAMUSCULAR; INTRAVENOUS at 05:17

## 2025-04-27 RX ADMIN — Medication 1 PUFF(S): at 16:42

## 2025-04-27 RX ADMIN — Medication 1 DROP(S): at 17:27

## 2025-04-27 RX ADMIN — HALOPERIDOL 5 MILLIGRAM(S): 10 TABLET ORAL at 05:17

## 2025-04-27 RX ADMIN — Medication 3.5 MICROGRAM(S)/KG/HR: at 17:30

## 2025-04-27 RX ADMIN — Medication 15 MILLILITER(S): at 05:18

## 2025-04-27 RX ADMIN — Medication 1 PUFF(S): at 01:51

## 2025-04-27 RX ADMIN — HALOPERIDOL 5 MILLIGRAM(S): 10 TABLET ORAL at 21:47

## 2025-04-27 RX ADMIN — Medication 2 PUFF(S): at 20:07

## 2025-04-27 RX ADMIN — PROPOFOL 4.49 MICROGRAM(S)/KG/MIN: 10 INJECTION, EMULSION INTRAVENOUS at 03:18

## 2025-04-27 RX ADMIN — SODIUM CHLORIDE 50 MILLILITER(S): 9 INJECTION, SOLUTION INTRAVENOUS at 17:41

## 2025-04-27 RX ADMIN — NOREPINEPHRINE BITARTRATE 6.56 MICROGRAM(S)/KG/MIN: 8 SOLUTION at 22:13

## 2025-04-27 RX ADMIN — Medication 1 PUFF(S): at 20:07

## 2025-04-27 RX ADMIN — Medication 20 MILLIEQUIVALENT(S): at 06:31

## 2025-04-27 RX ADMIN — Medication 2 PUFF(S): at 01:51

## 2025-04-27 RX ADMIN — Medication 100 MILLIGRAM(S): at 12:06

## 2025-04-27 RX ADMIN — Medication 1 DROP(S): at 12:07

## 2025-04-27 RX ADMIN — Medication 1 APPLICATION(S): at 05:18

## 2025-04-27 RX ADMIN — PROPOFOL 4.49 MICROGRAM(S)/KG/MIN: 10 INJECTION, EMULSION INTRAVENOUS at 21:33

## 2025-04-27 RX ADMIN — PROPOFOL 4.49 MICROGRAM(S)/KG/MIN: 10 INJECTION, EMULSION INTRAVENOUS at 13:32

## 2025-04-27 RX ADMIN — Medication 1 APPLICATION(S): at 17:29

## 2025-04-27 RX ADMIN — Medication 1 TABLET(S): at 12:05

## 2025-04-27 RX ADMIN — Medication 1 PUFF(S): at 08:13

## 2025-04-27 RX ADMIN — Medication 100 MILLIEQUIVALENT(S): at 05:17

## 2025-04-27 RX ADMIN — ENOXAPARIN SODIUM 40 MILLIGRAM(S): 100 INJECTION SUBCUTANEOUS at 05:17

## 2025-04-27 RX ADMIN — Medication 15 MILLILITER(S): at 17:27

## 2025-04-27 RX ADMIN — AMPICILLIN SODIUM AND SULBACTAM SODIUM 200 GRAM(S): 1; .5 INJECTION, POWDER, FOR SOLUTION INTRAMUSCULAR; INTRAVENOUS at 23:25

## 2025-04-27 RX ADMIN — AMPICILLIN SODIUM AND SULBACTAM SODIUM 200 GRAM(S): 1; .5 INJECTION, POWDER, FOR SOLUTION INTRAMUSCULAR; INTRAVENOUS at 12:06

## 2025-04-27 RX ADMIN — Medication 1 APPLICATION(S): at 17:27

## 2025-04-27 RX ADMIN — Medication 1 DROP(S): at 05:19
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
## 2025-04-27 NOTE — DIETITIAN INITIAL EVALUATION ADULT - NUTRITIONGOAL OUTCOME2
Tube feeds resumed as soon as medically feasible; pt to demonstrate tolerance to nutrition support regimen, meeting >85% & <105% of estimated nutrient needs x4 days.    Pt at high nutrition risk.    Monitor: Skin, labs, BM, wt, nutrition focused physical findings, body composition, GI, respiratory status, hemodynamic stability, diet order, tube feed tolerance, anthropometric data.
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
## 2025-04-27 NOTE — DIETITIAN INITIAL EVALUATION ADULT - ORAL INTAKE PTA/DIET HISTORY
Pt intubated at time of RD visit & unable to participate in nutrition interview. No family at bedside during RD visit. Attempted to contact emergency contact, however upon chart review, only available contact number listed is pt's own number. Attempted to call this number to see if family would be available to respond, however no response at this time. No previous admit wt records available at this time. No previous admit nutrition hx available at this time. No signs of significant muscle wasting/subcutaneous fat loss observed upon comprehensive nutrition focused physical exam.

## 2025-04-27 NOTE — DIETITIAN INITIAL EVALUATION ADULT - PERTINENT MEDS FT
MEDICATIONS  (STANDING):  albuterol    90 MICROgram(s) HFA Inhaler 2 Puff(s) Inhalation every 6 hours  ampicillin/sulbactam  IVPB 3 Gram(s) IV Intermittent every 6 hours  ampicillin/sulbactam  IVPB      artificial  tears Solution 1 Drop(s) Both EYES every 6 hours  bacitracin   Ointment 1 Application(s) Topical two times a day  chlorhexidine 0.12% Liquid 15 milliLiter(s) Oral Mucosa every 12 hours  chlorhexidine 4% Liquid 1 Application(s) Topical <User Schedule>  diazepam  Injectable   IV Push   enoxaparin Injectable 40 milliGRAM(s) SubCutaneous every 24 hours  fentaNYL   Infusion 0.5 MICROgram(s)/kG/Hr (3.5 mL/Hr) IV Continuous <Continuous>  folic acid 1 milliGRAM(s) Oral daily  haloperidol     Tablet 5 milliGRAM(s) Oral every 8 hours  ipratropium 17 MICROgram(s) HFA Inhaler 1 Puff(s) Inhalation every 6 hours  lactated ringers. 1000 milliLiter(s) (50 mL/Hr) IV Continuous <Continuous>  lidocaine 1% Injectable 40 milliLiter(s) Local Injection once  multivitamin 1 Tablet(s) Oral daily  norepinephrine Infusion 0.05 MICROgram(s)/kG/Min (6.56 mL/Hr) IV Continuous <Continuous>  petrolatum Ophthalmic Ointment 1 Application(s) Both EYES two times a day  propofol Infusion 10 MICROgram(s)/kG/Min (4.49 mL/Hr) IV Continuous <Continuous>  thiamine 100 milliGRAM(s) Oral daily    MEDICATIONS  (PRN):  acetaminophen     Tablet .. 650 milliGRAM(s) Oral every 6 hours PRN Temp greater or equal to 38C (100.4F)  diazepam  Injectable 10 milliGRAM(s) IV Push every 1 hour PRN CIWA 15 or greater, or seizure    Current propofol rate at goal to provide ~119 additional kcal/day.
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
## 2025-04-27 NOTE — DIETITIAN INITIAL EVALUATION ADULT - ADD RECOMMEND
Recommendation: (1) Measure height. (2) Monitor for BM and evaluate need for bowel regimen if no BM persists. (3) Continue to monitor PO4. (4) When able to resume tube feeds, initiate Peptamen 1.5 at 50 mL/hr. Increase by 10 mL q4-6hrs as tolerated to goal rate 75 mL/hr provided over 18hr duration (total daily Peptamen 1.5 volume = 1350 mL/day). Tube feed regimen at goal to provide 2025 kcal, 92 g protein, 1037 mL free H2O. Provide 100 mL flushes q6hrs. Current propofol rate 4.49 mL/hr to add ~119 kcal/day.

## 2025-04-27 NOTE — DIETITIAN INITIAL EVALUATION ADULT - NSFNSGIASSESSMENTFT_GEN_A_CORE
date of last BM unknown at this time. No BM documented this admit per flowsheet records. Hypoactive bowel sounds noted. Abd noted non-distended. No nausea/vomiting reported at this time.

## 2025-04-27 NOTE — DIETITIAN INITIAL EVALUATION ADULT - NS FNS DIET ORDER
Pt was initially ordered to receive Pivot 1.5 at 50 mL/hr over 24 hr duration (4/25 - 4/27), diet order was changed to Nepro at 50 mL/hr over 18 hr duration, however subsequently d/c'd. Reportedly was tolerating tube feeds prior to NPO diet order. Currently with no active diet order, however per resident, plan to resume tube feeds.

## 2025-04-27 NOTE — DIETITIAN INITIAL EVALUATION ADULT - NSFNSGIIOFT_GEN_A_CORE
Ht data unavailable at this time; no previous admit wt records available. Unable to contact family; pt unable to provide this information at this time.    04-26-25 @ 07:01  -  04-27-25 @ 07:00  --------------------------------------------------------  OUT:  Total OUT: 0 mL    Total NET: 700 mL      04-27-25 @ 07:01  -  04-27-25 @ 21:19  --------------------------------------------------------  OUT:  Total OUT: 0 mL    Total NET: 600 mL

## 2025-04-27 NOTE — DIETITIAN INITIAL EVALUATION ADULT - PERTINENT LABORATORY DATA
04-27    141  |  109  |  8[L]  ----------------------------<  150[H]  4.2   |  26  |  <0.5[L]    Ca    8.2[L]      27 Apr 2025 16:00  Phos  2.8     04-27  Mg     1.7     04-27    TPro  4.5[L]  /  Alb  2.7[L]  /  TBili  <0.2  /  DBili  x   /  AST  67[H]  /  ALT  38  /  AlkPhos  89  04-27  POCT Blood Glucose.: 113 mg/dL (04-27-25 @ 20:51)  A1C with Estimated Average Glucose Result: 5.8 % (04-25-25 @ 04:21)
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
## 2025-04-27 NOTE — DIETITIAN INITIAL EVALUATION ADULT - NSPROEDAREADYLEARN_GEN_A_NUR
Education deferred in setting of intubation; pt unable to participate in nutrition interview at this time.
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
## 2025-04-28 DIAGNOSIS — R45.1 RESTLESSNESS AND AGITATION: ICD-10-CM

## 2025-04-28 LAB
ALBUMIN SERPL ELPH-MCNC: 2.7 G/DL — LOW (ref 3.5–5.2)
ALP SERPL-CCNC: 115 U/L — SIGNIFICANT CHANGE UP (ref 30–115)
ALT FLD-CCNC: 56 U/L — HIGH (ref 0–41)
ANION GAP SERPL CALC-SCNC: 8 MMOL/L — SIGNIFICANT CHANGE UP (ref 7–14)
ANION GAP SERPL CALC-SCNC: 8 MMOL/L — SIGNIFICANT CHANGE UP (ref 7–14)
AST SERPL-CCNC: 64 U/L — HIGH (ref 0–41)
BASE EXCESS BLDA CALC-SCNC: 0.5 MMOL/L — SIGNIFICANT CHANGE UP (ref -2–3)
BASE EXCESS BLDA CALC-SCNC: 0.6 MMOL/L — SIGNIFICANT CHANGE UP (ref -2–3)
BASE EXCESS BLDA CALC-SCNC: 2.5 MMOL/L — SIGNIFICANT CHANGE UP (ref -2–3)
BASOPHILS # BLD AUTO: 0.01 K/UL — SIGNIFICANT CHANGE UP (ref 0–0.2)
BASOPHILS # BLD AUTO: 0.05 K/UL — SIGNIFICANT CHANGE UP (ref 0–0.2)
BASOPHILS NFR BLD AUTO: 0.1 % — SIGNIFICANT CHANGE UP (ref 0–1)
BASOPHILS NFR BLD AUTO: 0.7 % — SIGNIFICANT CHANGE UP (ref 0–1)
BILIRUB SERPL-MCNC: <0.2 MG/DL — SIGNIFICANT CHANGE UP (ref 0.2–1.2)
BUN SERPL-MCNC: 10 MG/DL — SIGNIFICANT CHANGE UP (ref 10–20)
BUN SERPL-MCNC: 8 MG/DL — LOW (ref 10–20)
CALCIUM SERPL-MCNC: 8.2 MG/DL — LOW (ref 8.4–10.5)
CALCIUM SERPL-MCNC: 9 MG/DL — SIGNIFICANT CHANGE UP (ref 8.4–10.5)
CHLORIDE SERPL-SCNC: 103 MMOL/L — SIGNIFICANT CHANGE UP (ref 98–110)
CHLORIDE SERPL-SCNC: 106 MMOL/L — SIGNIFICANT CHANGE UP (ref 98–110)
CO2 SERPL-SCNC: 26 MMOL/L — SIGNIFICANT CHANGE UP (ref 17–32)
CO2 SERPL-SCNC: 27 MMOL/L — SIGNIFICANT CHANGE UP (ref 17–32)
CREAT SERPL-MCNC: <0.5 MG/DL — LOW (ref 0.7–1.5)
CREAT SERPL-MCNC: <0.5 MG/DL — LOW (ref 0.7–1.5)
CULTURE RESULTS: SIGNIFICANT CHANGE UP
EGFR: 115 ML/MIN/1.73M2 — SIGNIFICANT CHANGE UP
EOSINOPHIL # BLD AUTO: 0.03 K/UL — SIGNIFICANT CHANGE UP (ref 0–0.7)
EOSINOPHIL # BLD AUTO: 0.56 K/UL — SIGNIFICANT CHANGE UP (ref 0–0.7)
EOSINOPHIL NFR BLD AUTO: 0.4 % — SIGNIFICANT CHANGE UP (ref 0–8)
EOSINOPHIL NFR BLD AUTO: 8.2 % — HIGH (ref 0–8)
GAS PNL BLDA: SIGNIFICANT CHANGE UP
GLUCOSE SERPL-MCNC: 127 MG/DL — HIGH (ref 70–99)
GLUCOSE SERPL-MCNC: 136 MG/DL — HIGH (ref 70–99)
HCO3 BLDA-SCNC: 27 MMOL/L — SIGNIFICANT CHANGE UP (ref 21–28)
HCO3 BLDA-SCNC: 28 MMOL/L — SIGNIFICANT CHANGE UP (ref 21–28)
HCO3 BLDA-SCNC: 29 MMOL/L — HIGH (ref 21–28)
HCT VFR BLD CALC: 29.7 % — LOW (ref 37–47)
HCT VFR BLD CALC: 35.1 % — LOW (ref 37–47)
HGB BLD-MCNC: 10 G/DL — LOW (ref 12–16)
HGB BLD-MCNC: 12 G/DL — SIGNIFICANT CHANGE UP (ref 12–16)
HOROWITZ INDEX BLDA+IHG-RTO: 40 — SIGNIFICANT CHANGE UP
HOROWITZ INDEX BLDA+IHG-RTO: 50 — SIGNIFICANT CHANGE UP
HOROWITZ INDEX BLDA+IHG-RTO: 50 — SIGNIFICANT CHANGE UP
IMM GRANULOCYTES NFR BLD AUTO: 0.6 % — HIGH (ref 0.1–0.3)
IMM GRANULOCYTES NFR BLD AUTO: 0.7 % — HIGH (ref 0.1–0.3)
LYMPHOCYTES # BLD AUTO: 0.36 K/UL — LOW (ref 1.2–3.4)
LYMPHOCYTES # BLD AUTO: 1.43 K/UL — SIGNIFICANT CHANGE UP (ref 1.2–3.4)
LYMPHOCYTES # BLD AUTO: 20.8 % — SIGNIFICANT CHANGE UP (ref 20.5–51.1)
LYMPHOCYTES # BLD AUTO: 4.9 % — LOW (ref 20.5–51.1)
MAGNESIUM SERPL-MCNC: 1.9 MG/DL — SIGNIFICANT CHANGE UP (ref 1.8–2.4)
MAGNESIUM SERPL-MCNC: 2 MG/DL — SIGNIFICANT CHANGE UP (ref 1.8–2.4)
MCHC RBC-ENTMCNC: 29.6 PG — SIGNIFICANT CHANGE UP (ref 27–31)
MCHC RBC-ENTMCNC: 29.7 PG — SIGNIFICANT CHANGE UP (ref 27–31)
MCHC RBC-ENTMCNC: 33.7 G/DL — SIGNIFICANT CHANGE UP (ref 32–37)
MCHC RBC-ENTMCNC: 34.2 G/DL — SIGNIFICANT CHANGE UP (ref 32–37)
MCV RBC AUTO: 86.9 FL — SIGNIFICANT CHANGE UP (ref 81–99)
MCV RBC AUTO: 87.9 FL — SIGNIFICANT CHANGE UP (ref 81–99)
MONOCYTES # BLD AUTO: 0.15 K/UL — SIGNIFICANT CHANGE UP (ref 0.1–0.6)
MONOCYTES # BLD AUTO: 0.71 K/UL — HIGH (ref 0.1–0.6)
MONOCYTES NFR BLD AUTO: 10.3 % — HIGH (ref 1.7–9.3)
MONOCYTES NFR BLD AUTO: 2.1 % — SIGNIFICANT CHANGE UP (ref 1.7–9.3)
NEUTROPHILS # BLD AUTO: 4.08 K/UL — SIGNIFICANT CHANGE UP (ref 1.4–6.5)
NEUTROPHILS # BLD AUTO: 6.7 K/UL — HIGH (ref 1.4–6.5)
NEUTROPHILS NFR BLD AUTO: 59.4 % — SIGNIFICANT CHANGE UP (ref 42.2–75.2)
NEUTROPHILS NFR BLD AUTO: 91.8 % — HIGH (ref 42.2–75.2)
NRBC BLD AUTO-RTO: 0 /100 WBCS — SIGNIFICANT CHANGE UP (ref 0–0)
NRBC BLD AUTO-RTO: 0 /100 WBCS — SIGNIFICANT CHANGE UP (ref 0–0)
PCO2 BLDA: 45 MMHG — SIGNIFICANT CHANGE UP (ref 32–45)
PCO2 BLDA: 47 MMHG — HIGH (ref 32–45)
PCO2 BLDA: 61 MMHG — HIGH (ref 32–45)
PH BLDA: 7.28 — LOW (ref 7.35–7.45)
PH BLDA: 7.36 — SIGNIFICANT CHANGE UP (ref 7.35–7.45)
PH BLDA: 7.4 — SIGNIFICANT CHANGE UP (ref 7.35–7.45)
PHOSPHATE SERPL-MCNC: 3 MG/DL — SIGNIFICANT CHANGE UP (ref 2.1–4.9)
PHOSPHATE SERPL-MCNC: 3.9 MG/DL — SIGNIFICANT CHANGE UP (ref 2.1–4.9)
PLATELET # BLD AUTO: 187 K/UL — SIGNIFICANT CHANGE UP (ref 130–400)
PLATELET # BLD AUTO: 199 K/UL — SIGNIFICANT CHANGE UP (ref 130–400)
PMV BLD: 9 FL — SIGNIFICANT CHANGE UP (ref 7.4–10.4)
PMV BLD: 9 FL — SIGNIFICANT CHANGE UP (ref 7.4–10.4)
PO2 BLDA: 123 MMHG — HIGH (ref 83–108)
PO2 BLDA: 66 MMHG — LOW (ref 83–108)
PO2 BLDA: 85 MMHG — SIGNIFICANT CHANGE UP (ref 83–108)
POTASSIUM SERPL-MCNC: 3.8 MMOL/L — SIGNIFICANT CHANGE UP (ref 3.5–5)
POTASSIUM SERPL-MCNC: 4.2 MMOL/L — SIGNIFICANT CHANGE UP (ref 3.5–5)
POTASSIUM SERPL-SCNC: 3.8 MMOL/L — SIGNIFICANT CHANGE UP (ref 3.5–5)
POTASSIUM SERPL-SCNC: 4.2 MMOL/L — SIGNIFICANT CHANGE UP (ref 3.5–5)
PROT SERPL-MCNC: 4.5 G/DL — LOW (ref 6–8)
RBC # BLD: 3.38 M/UL — LOW (ref 4.2–5.4)
RBC # BLD: 4.04 M/UL — LOW (ref 4.2–5.4)
RBC # FLD: 12.7 % — SIGNIFICANT CHANGE UP (ref 11.5–14.5)
RBC # FLD: 13.1 % — SIGNIFICANT CHANGE UP (ref 11.5–14.5)
SAO2 % BLDA: 91.7 % — LOW (ref 94–98)
SAO2 % BLDA: 98.2 % — HIGH (ref 94–98)
SAO2 % BLDA: 99 % — HIGH (ref 94–98)
SODIUM SERPL-SCNC: 138 MMOL/L — SIGNIFICANT CHANGE UP (ref 135–146)
SODIUM SERPL-SCNC: 140 MMOL/L — SIGNIFICANT CHANGE UP (ref 135–146)
SPECIMEN SOURCE: SIGNIFICANT CHANGE UP
WBC # BLD: 6.87 K/UL — SIGNIFICANT CHANGE UP (ref 4.8–10.8)
WBC # BLD: 7.3 K/UL — SIGNIFICANT CHANGE UP (ref 4.8–10.8)
WBC # FLD AUTO: 6.87 K/UL — SIGNIFICANT CHANGE UP (ref 4.8–10.8)
WBC # FLD AUTO: 7.3 K/UL — SIGNIFICANT CHANGE UP (ref 4.8–10.8)

## 2025-04-28 PROCEDURE — 99291 CRITICAL CARE FIRST HOUR: CPT

## 2025-04-28 PROCEDURE — 93010 ELECTROCARDIOGRAM REPORT: CPT

## 2025-04-28 PROCEDURE — 71045 X-RAY EXAM CHEST 1 VIEW: CPT | Mod: 26

## 2025-04-28 PROCEDURE — 99292 CRITICAL CARE ADDL 30 MIN: CPT

## 2025-04-28 RX ORDER — DIPHENHYDRAMINE HCL 12.5MG/5ML
50 ELIXIR ORAL AT BEDTIME
Refills: 0 | Status: DISCONTINUED | OUTPATIENT
Start: 2025-04-28 | End: 2025-05-01

## 2025-04-28 RX ORDER — LOSARTAN POTASSIUM 100 MG/1
25 TABLET, FILM COATED ORAL DAILY
Refills: 0 | Status: DISCONTINUED | OUTPATIENT
Start: 2025-04-28 | End: 2025-05-08

## 2025-04-28 RX ORDER — HALOPERIDOL 10 MG/1
5 TABLET ORAL ONCE
Refills: 0 | Status: COMPLETED | OUTPATIENT
Start: 2025-04-28 | End: 2025-04-28

## 2025-04-28 RX ORDER — HALOPERIDOL 10 MG/1
5 TABLET ORAL EVERY 6 HOURS
Refills: 0 | Status: DISCONTINUED | OUTPATIENT
Start: 2025-04-28 | End: 2025-04-29

## 2025-04-28 RX ORDER — SCOPOLAMINE 1 MG/3D
1 PATCH, EXTENDED RELEASE TRANSDERMAL ONCE
Refills: 0 | Status: COMPLETED | OUTPATIENT
Start: 2025-04-28 | End: 2025-04-28

## 2025-04-28 RX ORDER — GABAPENTIN 400 MG/1
300 CAPSULE ORAL DAILY
Refills: 0 | Status: DISCONTINUED | OUTPATIENT
Start: 2025-04-28 | End: 2025-05-08

## 2025-04-28 RX ORDER — DEXMEDETOMIDINE HYDROCHLORIDE IN SODIUM CHLORIDE 4 UG/ML
0.19 INJECTION INTRAVENOUS
Qty: 200 | Refills: 0 | Status: DISCONTINUED | OUTPATIENT
Start: 2025-04-28 | End: 2025-04-28

## 2025-04-28 RX ORDER — METHYLPREDNISOLONE ACETATE 80 MG/ML
125 INJECTION, SUSPENSION INTRA-ARTICULAR; INTRALESIONAL; INTRAMUSCULAR; SOFT TISSUE ONCE
Refills: 0 | Status: COMPLETED | OUTPATIENT
Start: 2025-04-28 | End: 2025-04-28

## 2025-04-28 RX ADMIN — Medication 1 PUFF(S): at 20:02

## 2025-04-28 RX ADMIN — Medication 1 DROP(S): at 17:36

## 2025-04-28 RX ADMIN — Medication 15 MILLILITER(S): at 05:04

## 2025-04-28 RX ADMIN — HALOPERIDOL 5 MILLIGRAM(S): 10 TABLET ORAL at 11:28

## 2025-04-28 RX ADMIN — DEXMEDETOMIDINE HYDROCHLORIDE IN SODIUM CHLORIDE 3.5 MICROGRAM(S)/KG/HR: 4 INJECTION INTRAVENOUS at 09:11

## 2025-04-28 RX ADMIN — Medication 1 APPLICATION(S): at 05:03

## 2025-04-28 RX ADMIN — Medication 10 MILLIGRAM(S): at 12:43

## 2025-04-28 RX ADMIN — DIAZEPAM 2 MILLIGRAM(S): 2 TABLET ORAL at 05:05

## 2025-04-28 RX ADMIN — SCOPOLAMINE 1 PATCH: 1 PATCH, EXTENDED RELEASE TRANSDERMAL at 17:28

## 2025-04-28 RX ADMIN — DIAZEPAM 10 MILLIGRAM(S): 2 TABLET ORAL at 22:55

## 2025-04-28 RX ADMIN — Medication 2 PUFF(S): at 09:04

## 2025-04-28 RX ADMIN — Medication 1 DROP(S): at 23:02

## 2025-04-28 RX ADMIN — PROPOFOL 4.49 MICROGRAM(S)/KG/MIN: 10 INJECTION, EMULSION INTRAVENOUS at 06:52

## 2025-04-28 RX ADMIN — Medication 1 DROP(S): at 11:16

## 2025-04-28 RX ADMIN — Medication 52.5 MILLIGRAM(S): at 17:38

## 2025-04-28 RX ADMIN — DIAZEPAM 10 MILLIGRAM(S): 2 TABLET ORAL at 19:57

## 2025-04-28 RX ADMIN — HALOPERIDOL 5 MILLIGRAM(S): 10 TABLET ORAL at 19:45

## 2025-04-28 RX ADMIN — Medication 1 PUFF(S): at 09:04

## 2025-04-28 RX ADMIN — Medication 1 DROP(S): at 05:03

## 2025-04-28 RX ADMIN — Medication 1 PUFF(S): at 13:05

## 2025-04-28 RX ADMIN — Medication 2 PUFF(S): at 13:05

## 2025-04-28 RX ADMIN — Medication 1 PUFF(S): at 01:47

## 2025-04-28 RX ADMIN — Medication 10 MILLIGRAM(S): at 18:49

## 2025-04-28 RX ADMIN — AMPICILLIN SODIUM AND SULBACTAM SODIUM 200 GRAM(S): 1; .5 INJECTION, POWDER, FOR SOLUTION INTRAMUSCULAR; INTRAVENOUS at 05:04

## 2025-04-28 RX ADMIN — ENOXAPARIN SODIUM 40 MILLIGRAM(S): 100 INJECTION SUBCUTANEOUS at 05:03

## 2025-04-28 RX ADMIN — Medication 1 APPLICATION(S): at 17:36

## 2025-04-28 RX ADMIN — Medication 1 APPLICATION(S): at 17:24

## 2025-04-28 RX ADMIN — Medication 1 APPLICATION(S): at 05:04

## 2025-04-28 RX ADMIN — Medication 1 APPLICATION(S): at 05:02

## 2025-04-28 RX ADMIN — DIAZEPAM 10 MILLIGRAM(S): 2 TABLET ORAL at 21:06

## 2025-04-28 RX ADMIN — AMPICILLIN SODIUM AND SULBACTAM SODIUM 200 GRAM(S): 1; .5 INJECTION, POWDER, FOR SOLUTION INTRAMUSCULAR; INTRAVENOUS at 23:01

## 2025-04-28 RX ADMIN — Medication 2 PUFF(S): at 20:02

## 2025-04-28 RX ADMIN — METHYLPREDNISOLONE ACETATE 125 MILLIGRAM(S): 80 INJECTION, SUSPENSION INTRA-ARTICULAR; INTRALESIONAL; INTRAMUSCULAR; SOFT TISSUE at 11:15

## 2025-04-28 RX ADMIN — SCOPOLAMINE 1 PATCH: 1 PATCH, EXTENDED RELEASE TRANSDERMAL at 19:55

## 2025-04-28 RX ADMIN — HALOPERIDOL 5 MILLIGRAM(S): 10 TABLET ORAL at 05:03

## 2025-04-28 RX ADMIN — AMPICILLIN SODIUM AND SULBACTAM SODIUM 200 GRAM(S): 1; .5 INJECTION, POWDER, FOR SOLUTION INTRAMUSCULAR; INTRAVENOUS at 11:15

## 2025-04-28 RX ADMIN — AMPICILLIN SODIUM AND SULBACTAM SODIUM 200 GRAM(S): 1; .5 INJECTION, POWDER, FOR SOLUTION INTRAMUSCULAR; INTRAVENOUS at 17:24

## 2025-04-28 RX ADMIN — SODIUM CHLORIDE 50 MILLILITER(S): 9 INJECTION, SOLUTION INTRAVENOUS at 13:23

## 2025-04-28 RX ADMIN — Medication 2 PUFF(S): at 01:47

## 2025-04-28 RX ADMIN — PROPOFOL 4.49 MICROGRAM(S)/KG/MIN: 10 INJECTION, EMULSION INTRAVENOUS at 02:34

## 2025-04-28 RX ADMIN — DIAZEPAM 2 MILLIGRAM(S): 2 TABLET ORAL at 17:24
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
## 2025-04-28 NOTE — BH CONSULTATION LIAISON ASSESSMENT NOTE - NSBHCONSULTRECOMMENDOTHER_PSY_A_CORE FT
- Start Depakote 250mg IV BID for mood/ agitation, monitor LFT trends  - Change Haldol to 5mg IM Q6H for agitation  - Start Benadryl 50mg IV QHS prn sleep  - Obtain collateral information patient's psychiatrist Gia Hollingsworth    Recommendations to nursing for non-pharmacological interventions for delirium management:  1.	Reorient Frequently   2.	Encourage Early Mobility   3.	Encourage talking to patient prior to hands on care    4.	Prevent overstimulation     5.	Prevent Day night Reversal : Out of bed at least 3 times during the day. Can be sitting in chair or walking in unit with assistance or even sitting on bed   6.	Curtains up from 8 am to 8 pm    7.	At least 6 hours of uninterrupted sleep at night    8.	Avoid Benzodiazepines, Anticholinergics and antihistaminergics   9.	Avoid Restraints : if needed utilize the least restrictive form : 1:1  <hand mitts< 2 point soft < 4 point soft < 2 point hard <  4 point hard    10.	Treat underlying cause    11.	Maintain K>4 and Mg >2 at all times
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
## 2025-04-28 NOTE — BH CONSULTATION LIAISON ASSESSMENT NOTE - PAST PSYCHOTROPIC MEDICATION
Per chart, Patient was prescribed prior to current hospitalization:  - Alprazolam 0.25mg QD prn  - Depakote 750mg QHS  - Gabapentin 300mg QD  - Risperidone 2mg QD
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
## 2025-04-28 NOTE — BH CONSULTATION LIAISON ASSESSMENT NOTE - NSBHREFERDETAILS_PSY_A_CORE_FT
Per consult, hx bipolar disorder/anxiety. as per daughter pt was admitted to [facility] for 34 days last year  -outpatient psych meds: Alprazolam 0.25mg prn, Divalproex ER 250mg x 3tabs hs, Gabapentin 300mg daily, Risperidone 2mg. per daughter patient noncompliant. patient currently agitated.

## 2025-04-28 NOTE — BH CONSULTATION LIAISON ASSESSMENT NOTE - NSBHATTESTCOMMENTATTENDFT_PSY_A_CORE
Patient seen at bedside; patient was calm and cooperated with communication through head nods/shaking. She did not appear agitated at this time. Due to limited medication options due to inability to give medications PO--will recommend IV depakote to address agitation with monitoring of elevation of liver enzymes.

## 2025-04-28 NOTE — BH CONSULTATION LIAISON ASSESSMENT NOTE - NSBHATTESTAPPAMEND_PSY_A_CORE
I have personally seen and examined this patient. I fully participated in the care of this patient. I have made amendments to the documentation where appropriate and otherwise agree with the history, physical exam, and plan as documented by the SELENE

## 2025-04-28 NOTE — BH CONSULTATION LIAISON ASSESSMENT NOTE - NSBHCHARTREVIEWVS_PSY_A_CORE FT
Vital Signs Last 24 Hrs  T(C): 36.6 (28 Apr 2025 12:00), Max: 37.5 (27 Apr 2025 23:00)  T(F): 97.8 (28 Apr 2025 12:00), Max: 99.5 (27 Apr 2025 23:00)  HR: 86 (28 Apr 2025 12:00) (53 - 86)  BP: 195/82 (28 Apr 2025 12:00) (195/82 - 195/82)  BP(mean): 118 (28 Apr 2025 12:00) (118 - 118)  RR: 22 (28 Apr 2025 12:00) (20 - 28)  SpO2: 96% (28 Apr 2025 12:00) (95% - 100%)    Parameters below as of 28 Apr 2025 11:00  Patient On (Oxygen Delivery Method): ventilator

## 2025-04-28 NOTE — BH CONSULTATION LIAISON ASSESSMENT NOTE - CURRENT MEDICATION
MEDICATIONS  (STANDING):  albuterol    90 MICROgram(s) HFA Inhaler 2 Puff(s) Inhalation every 6 hours  ampicillin/sulbactam  IVPB 3 Gram(s) IV Intermittent every 6 hours  ampicillin/sulbactam  IVPB      artificial  tears Solution 1 Drop(s) Both EYES every 6 hours  bacitracin   Ointment 1 Application(s) Topical two times a day  chlorhexidine 4% Liquid 1 Application(s) Topical <User Schedule>  dexMEDEtomidine Infusion 0.187 MICROgram(s)/kG/Hr (3.5 mL/Hr) IV Continuous <Continuous>  diazepam  Injectable   IV Push   diazepam  Injectable 2 milliGRAM(s) IV Push every 12 hours  enoxaparin Injectable 40 milliGRAM(s) SubCutaneous every 24 hours  folic acid 1 milliGRAM(s) Oral daily  gabapentin 300 milliGRAM(s) Oral daily  haloperidol     Tablet 5 milliGRAM(s) Oral every 8 hours  ipratropium 17 MICROgram(s) HFA Inhaler 1 Puff(s) Inhalation every 6 hours  lactated ringers. 1000 milliLiter(s) (50 mL/Hr) IV Continuous <Continuous>  lidocaine 1% Injectable 40 milliLiter(s) Local Injection once  multivitamin 1 Tablet(s) Oral daily  petrolatum Ophthalmic Ointment 1 Application(s) Both EYES two times a day  scopolamine 1 mG/72 Hr(s) Patch 1 Patch Transdermal once  thiamine 100 milliGRAM(s) Oral daily    MEDICATIONS  (PRN):  acetaminophen     Tablet .. 650 milliGRAM(s) Oral every 6 hours PRN Temp greater or equal to 38C (100.4F)  diazepam  Injectable 10 milliGRAM(s) IV Push every 1 hour PRN CIWA 15 or greater, or seizure
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
## 2025-04-29 DIAGNOSIS — Z87.898 PERSONAL HISTORY OF OTHER SPECIFIED CONDITIONS: ICD-10-CM

## 2025-04-29 LAB
ALBUMIN SERPL ELPH-MCNC: 3.4 G/DL — LOW (ref 3.5–5.2)
ALP SERPL-CCNC: 169 U/L — HIGH (ref 30–115)
ALT FLD-CCNC: 124 U/L — HIGH (ref 0–41)
ANION GAP SERPL CALC-SCNC: 10 MMOL/L — SIGNIFICANT CHANGE UP (ref 7–14)
AST SERPL-CCNC: 95 U/L — HIGH (ref 0–41)
BASE EXCESS BLDA CALC-SCNC: 3.7 MMOL/L — HIGH (ref -2–3)
BASOPHILS # BLD AUTO: 0.01 K/UL — SIGNIFICANT CHANGE UP (ref 0–0.2)
BASOPHILS NFR BLD AUTO: 0.2 % — SIGNIFICANT CHANGE UP (ref 0–1)
BILIRUB DIRECT SERPL-MCNC: <0.2 MG/DL — SIGNIFICANT CHANGE UP (ref 0–0.3)
BILIRUB INDIRECT FLD-MCNC: >0.1 MG/DL — LOW (ref 0.2–1.2)
BILIRUB SERPL-MCNC: 0.3 MG/DL — SIGNIFICANT CHANGE UP (ref 0.2–1.2)
BUN SERPL-MCNC: 10 MG/DL — SIGNIFICANT CHANGE UP (ref 10–20)
CALCIUM SERPL-MCNC: 9.1 MG/DL — SIGNIFICANT CHANGE UP (ref 8.4–10.5)
CHLORIDE SERPL-SCNC: 99 MMOL/L — SIGNIFICANT CHANGE UP (ref 98–110)
CO2 SERPL-SCNC: 27 MMOL/L — SIGNIFICANT CHANGE UP (ref 17–32)
CREAT SERPL-MCNC: <0.5 MG/DL — LOW (ref 0.7–1.5)
EGFR: 115 ML/MIN/1.73M2 — SIGNIFICANT CHANGE UP
EGFR: 115 ML/MIN/1.73M2 — SIGNIFICANT CHANGE UP
EOSINOPHIL # BLD AUTO: 0 K/UL — SIGNIFICANT CHANGE UP (ref 0–0.7)
EOSINOPHIL NFR BLD AUTO: 0 % — SIGNIFICANT CHANGE UP (ref 0–8)
GAS PNL BLDA: SIGNIFICANT CHANGE UP
GAS PNL BLDA: SIGNIFICANT CHANGE UP
GLUCOSE SERPL-MCNC: 118 MG/DL — HIGH (ref 70–99)
HCO3 BLDA-SCNC: 28 MMOL/L — SIGNIFICANT CHANGE UP (ref 21–28)
HCT VFR BLD CALC: 32.1 % — LOW (ref 37–47)
HGB BLD-MCNC: 11.2 G/DL — LOW (ref 12–16)
HOROWITZ INDEX BLDA+IHG-RTO: 32 — SIGNIFICANT CHANGE UP
IMM GRANULOCYTES NFR BLD AUTO: 0.5 % — HIGH (ref 0.1–0.3)
LYMPHOCYTES # BLD AUTO: 0.53 K/UL — LOW (ref 1.2–3.4)
LYMPHOCYTES # BLD AUTO: 8.5 % — LOW (ref 20.5–51.1)
MAGNESIUM SERPL-MCNC: 1.8 MG/DL — SIGNIFICANT CHANGE UP (ref 1.8–2.4)
MCHC RBC-ENTMCNC: 29.9 PG — SIGNIFICANT CHANGE UP (ref 27–31)
MCHC RBC-ENTMCNC: 34.9 G/DL — SIGNIFICANT CHANGE UP (ref 32–37)
MCV RBC AUTO: 85.6 FL — SIGNIFICANT CHANGE UP (ref 81–99)
MONOCYTES # BLD AUTO: 0.56 K/UL — SIGNIFICANT CHANGE UP (ref 0.1–0.6)
MONOCYTES NFR BLD AUTO: 8.9 % — SIGNIFICANT CHANGE UP (ref 1.7–9.3)
NEUTROPHILS # BLD AUTO: 5.13 K/UL — SIGNIFICANT CHANGE UP (ref 1.4–6.5)
NEUTROPHILS NFR BLD AUTO: 81.9 % — HIGH (ref 42.2–75.2)
NRBC BLD AUTO-RTO: 0 /100 WBCS — SIGNIFICANT CHANGE UP (ref 0–0)
PCO2 BLDA: 39 MMHG — SIGNIFICANT CHANGE UP (ref 32–45)
PH BLDA: 7.46 — HIGH (ref 7.35–7.45)
PHOSPHATE SERPL-MCNC: 3.6 MG/DL — SIGNIFICANT CHANGE UP (ref 2.1–4.9)
PLATELET # BLD AUTO: 193 K/UL — SIGNIFICANT CHANGE UP (ref 130–400)
PMV BLD: 9.2 FL — SIGNIFICANT CHANGE UP (ref 7.4–10.4)
PO2 BLDA: 100 MMHG — SIGNIFICANT CHANGE UP (ref 83–108)
POTASSIUM SERPL-MCNC: 3.8 MMOL/L — SIGNIFICANT CHANGE UP (ref 3.5–5)
POTASSIUM SERPL-SCNC: 3.8 MMOL/L — SIGNIFICANT CHANGE UP (ref 3.5–5)
PROT SERPL-MCNC: 5.7 G/DL — LOW (ref 6–8)
RBC # BLD: 3.75 M/UL — LOW (ref 4.2–5.4)
RBC # FLD: 12.6 % — SIGNIFICANT CHANGE UP (ref 11.5–14.5)
SAO2 % BLDA: 99 % — HIGH (ref 94–98)
SODIUM SERPL-SCNC: 136 MMOL/L — SIGNIFICANT CHANGE UP (ref 135–146)
WBC # BLD: 6.26 K/UL — SIGNIFICANT CHANGE UP (ref 4.8–10.8)
WBC # FLD AUTO: 6.26 K/UL — SIGNIFICANT CHANGE UP (ref 4.8–10.8)

## 2025-04-29 PROCEDURE — 99292 CRITICAL CARE ADDL 30 MIN: CPT

## 2025-04-29 PROCEDURE — 93010 ELECTROCARDIOGRAM REPORT: CPT

## 2025-04-29 PROCEDURE — 99291 CRITICAL CARE FIRST HOUR: CPT

## 2025-04-29 PROCEDURE — 71045 X-RAY EXAM CHEST 1 VIEW: CPT | Mod: 26

## 2025-04-29 PROCEDURE — 99232 SBSQ HOSP IP/OBS MODERATE 35: CPT

## 2025-04-29 PROCEDURE — 76705 ECHO EXAM OF ABDOMEN: CPT | Mod: 26

## 2025-04-29 RX ORDER — DIAZEPAM 2 MG/1
10 TABLET ORAL
Refills: 0 | Status: DISCONTINUED | OUTPATIENT
Start: 2025-04-29 | End: 2025-04-30

## 2025-04-29 RX ADMIN — Medication 1 DROP(S): at 23:05

## 2025-04-29 RX ADMIN — AMPICILLIN SODIUM AND SULBACTAM SODIUM 200 GRAM(S): 1; .5 INJECTION, POWDER, FOR SOLUTION INTRAMUSCULAR; INTRAVENOUS at 11:48

## 2025-04-29 RX ADMIN — HALOPERIDOL 5 MILLIGRAM(S): 10 TABLET ORAL at 09:18

## 2025-04-29 RX ADMIN — DIAZEPAM 10 MILLIGRAM(S): 2 TABLET ORAL at 03:45

## 2025-04-29 RX ADMIN — Medication 1 APPLICATION(S): at 06:16

## 2025-04-29 RX ADMIN — SCOPOLAMINE 1 PATCH: 1 PATCH, EXTENDED RELEASE TRANSDERMAL at 18:03

## 2025-04-29 RX ADMIN — AMPICILLIN SODIUM AND SULBACTAM SODIUM 200 GRAM(S): 1; .5 INJECTION, POWDER, FOR SOLUTION INTRAMUSCULAR; INTRAVENOUS at 17:04

## 2025-04-29 RX ADMIN — Medication 2 PUFF(S): at 13:54

## 2025-04-29 RX ADMIN — Medication 1 APPLICATION(S): at 05:04

## 2025-04-29 RX ADMIN — Medication 1 PUFF(S): at 19:29

## 2025-04-29 RX ADMIN — HALOPERIDOL 5 MILLIGRAM(S): 10 TABLET ORAL at 03:00

## 2025-04-29 RX ADMIN — Medication 1 PUFF(S): at 13:54

## 2025-04-29 RX ADMIN — Medication 1 APPLICATION(S): at 17:04

## 2025-04-29 RX ADMIN — Medication 52.5 MILLIGRAM(S): at 06:16

## 2025-04-29 RX ADMIN — Medication 1 DROP(S): at 11:49

## 2025-04-29 RX ADMIN — Medication 2 PUFF(S): at 19:28

## 2025-04-29 RX ADMIN — AMPICILLIN SODIUM AND SULBACTAM SODIUM 200 GRAM(S): 1; .5 INJECTION, POWDER, FOR SOLUTION INTRAMUSCULAR; INTRAVENOUS at 23:06

## 2025-04-29 RX ADMIN — AMPICILLIN SODIUM AND SULBACTAM SODIUM 200 GRAM(S): 1; .5 INJECTION, POWDER, FOR SOLUTION INTRAMUSCULAR; INTRAVENOUS at 05:03

## 2025-04-29 RX ADMIN — Medication 1 PUFF(S): at 02:41

## 2025-04-29 RX ADMIN — Medication 1 DROP(S): at 05:04

## 2025-04-29 RX ADMIN — SCOPOLAMINE 1 PATCH: 1 PATCH, EXTENDED RELEASE TRANSDERMAL at 07:43

## 2025-04-29 RX ADMIN — Medication 1 DROP(S): at 17:04

## 2025-04-29 RX ADMIN — Medication 1 APPLICATION(S): at 17:05

## 2025-04-29 RX ADMIN — Medication 2 PUFF(S): at 02:42

## 2025-04-29 RX ADMIN — Medication 2 PUFF(S): at 07:38

## 2025-04-29 RX ADMIN — Medication 1 PUFF(S): at 07:39

## 2025-04-29 RX ADMIN — ENOXAPARIN SODIUM 40 MILLIGRAM(S): 100 INJECTION SUBCUTANEOUS at 05:04

## 2025-04-29 NOTE — SWALLOW BEDSIDE ASSESSMENT ADULT - SWALLOW EVAL: FUNCTIONAL LEVEL AT TIME OF EVAL
Pt received sitting upright in bed on O2 via NC. A&Ox self, place, year, situation. Slow to answer questions at times. +hoarse vocal quality s/p extubation
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
## 2025-04-29 NOTE — BH CONSULTATION LIAISON PROGRESS NOTE - NSBHFUPINTERVALHXFT_PSY_A_CORE
Patient sleeping in bed with 1:1 site at bedside. Arousable to verbal stimuli. Patient unable to speak or blink when asked to. Patient was able to nod her head "yes" when asked if team can to speak to her daughter. She denies suicidal ideation/ intent. Patient was unable to answer any other questions, and fell asleep. Since yesterday, patient received multiple doses of Valium and 2 doses of IM haldol, which may be the reason she is for her stuporous presentation this morning. Labs show her LFTs trending upwards.
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
## 2025-04-29 NOTE — SWALLOW BEDSIDE ASSESSMENT ADULT - SWALLOW EVAL: CURRENT DIET
NPO pending s/s. SLP attempted to see pt on 4/28 s/p extubation however pt was still on BIPAP and lethargic

## 2025-04-29 NOTE — SWALLOW BEDSIDE ASSESSMENT ADULT - SWALLOW EVAL: FEEDING ASSISTANCE
1:1 feeds. Encourage pt to attempt to feed herself with hand-over-hand assistance as needed./frequent cues/help required
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
## 2025-04-29 NOTE — CONSULT NOTE ADULT - NS PANP COMMENT GEN_ALL_CORE FT
Dr. Rollins I reviewed the patient's care provided by the Advanced Practice Provider and agree with the diagnosis and care plan. I personally saw the patient and performed a substantive portion of the visit including aspects of the history, physical exam, and medical decision making.

## 2025-04-29 NOTE — BH CONSULTATION LIAISON PROGRESS NOTE - NSBHCONSULTRECOMMENDOTHER_PSY_A_CORE FT
- Change Depakote to 250mg IV Q24h due to elevated LFTs  - Change Haldol to 2.5mg IV q8H prn agitation  - Continue Benadryl 50mg IV QHS prn sleep  - Obtain collateral information patient's psychiatrist Gia Hollingsworth and daughter (messages left)    Recommendations to nursing for non-pharmacological interventions for delirium management:  1.	Reorient Frequently   2.	Encourage Early Mobility   3.	Encourage talking to patient prior to hands on care    4.	Prevent overstimulation     5.	Prevent Day night Reversal : Out of bed at least 3 times during the day. Can be sitting in chair or walking in unit with assistance or even sitting on bed   6.	Curtains up from 8 am to 8 pm    7.	At least 6 hours of uninterrupted sleep at night    8.	Avoid Benzodiazepines, Anticholinergics and antihistaminergics   9.	Avoid Restraints : if needed utilize the least restrictive form : 1:1  <hand mitts< 2 point soft < 4 point soft < 2 point hard <  4 point hard    10.	Treat underlying cause    11.	Maintain K>4 and Mg >2 at all times
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
## 2025-04-30 DIAGNOSIS — F17.200 NICOTINE DEPENDENCE, UNSPECIFIED, UNCOMPLICATED: ICD-10-CM

## 2025-04-30 LAB
ALBUMIN SERPL ELPH-MCNC: 3.1 G/DL — LOW (ref 3.5–5.2)
ALP SERPL-CCNC: 136 U/L — HIGH (ref 30–115)
ALT FLD-CCNC: 99 U/L — HIGH (ref 0–41)
ANION GAP SERPL CALC-SCNC: 10 MMOL/L — SIGNIFICANT CHANGE UP (ref 7–14)
AST SERPL-CCNC: 55 U/L — HIGH (ref 0–41)
BILIRUB SERPL-MCNC: 0.3 MG/DL — SIGNIFICANT CHANGE UP (ref 0.2–1.2)
BUN SERPL-MCNC: 15 MG/DL — SIGNIFICANT CHANGE UP (ref 10–20)
CALCIUM SERPL-MCNC: 8.9 MG/DL — SIGNIFICANT CHANGE UP (ref 8.4–10.5)
CHLORIDE SERPL-SCNC: 102 MMOL/L — SIGNIFICANT CHANGE UP (ref 98–110)
CO2 SERPL-SCNC: 27 MMOL/L — SIGNIFICANT CHANGE UP (ref 17–32)
CREAT SERPL-MCNC: <0.5 MG/DL — LOW (ref 0.7–1.5)
EGFR: 108 ML/MIN/1.73M2 — SIGNIFICANT CHANGE UP
EGFR: 108 ML/MIN/1.73M2 — SIGNIFICANT CHANGE UP
GLUCOSE SERPL-MCNC: 113 MG/DL — HIGH (ref 70–99)
HCT VFR BLD CALC: 33 % — LOW (ref 37–47)
HGB BLD-MCNC: 11.4 G/DL — LOW (ref 12–16)
MAGNESIUM SERPL-MCNC: 1.8 MG/DL — SIGNIFICANT CHANGE UP (ref 1.8–2.4)
MCHC RBC-ENTMCNC: 30 PG — SIGNIFICANT CHANGE UP (ref 27–31)
MCHC RBC-ENTMCNC: 34.5 G/DL — SIGNIFICANT CHANGE UP (ref 32–37)
MCV RBC AUTO: 86.8 FL — SIGNIFICANT CHANGE UP (ref 81–99)
NRBC BLD AUTO-RTO: 0 /100 WBCS — SIGNIFICANT CHANGE UP (ref 0–0)
PLATELET # BLD AUTO: 224 K/UL — SIGNIFICANT CHANGE UP (ref 130–400)
PMV BLD: 9 FL — SIGNIFICANT CHANGE UP (ref 7.4–10.4)
POTASSIUM SERPL-MCNC: 4.1 MMOL/L — SIGNIFICANT CHANGE UP (ref 3.5–5)
POTASSIUM SERPL-SCNC: 4.1 MMOL/L — SIGNIFICANT CHANGE UP (ref 3.5–5)
PROT SERPL-MCNC: 5.1 G/DL — LOW (ref 6–8)
RBC # BLD: 3.8 M/UL — LOW (ref 4.2–5.4)
RBC # FLD: 12.6 % — SIGNIFICANT CHANGE UP (ref 11.5–14.5)
SODIUM SERPL-SCNC: 139 MMOL/L — SIGNIFICANT CHANGE UP (ref 135–146)
WBC # BLD: 7.89 K/UL — SIGNIFICANT CHANGE UP (ref 4.8–10.8)
WBC # FLD AUTO: 7.89 K/UL — SIGNIFICANT CHANGE UP (ref 4.8–10.8)

## 2025-04-30 PROCEDURE — 99232 SBSQ HOSP IP/OBS MODERATE 35: CPT

## 2025-04-30 PROCEDURE — 93010 ELECTROCARDIOGRAM REPORT: CPT

## 2025-04-30 PROCEDURE — 71045 X-RAY EXAM CHEST 1 VIEW: CPT | Mod: 26

## 2025-04-30 PROCEDURE — 99223 1ST HOSP IP/OBS HIGH 75: CPT

## 2025-04-30 RX ORDER — NICOTINE POLACRILEX 4 MG/1
2 GUM, CHEWING ORAL
Refills: 0 | Status: DISCONTINUED | OUTPATIENT
Start: 2025-04-30 | End: 2025-05-08

## 2025-04-30 RX ORDER — NICOTINE POLACRILEX 4 MG/1
1 GUM, CHEWING ORAL DAILY
Refills: 0 | Status: DISCONTINUED | OUTPATIENT
Start: 2025-04-30 | End: 2025-05-08

## 2025-04-30 RX ORDER — CYST/ALA/Q10/PHOS.SER/DHA/BROC 100-20-50
1 POWDER (GRAM) ORAL DAILY
Refills: 0 | Status: DISCONTINUED | OUTPATIENT
Start: 2025-04-30 | End: 2025-05-08

## 2025-04-30 RX ORDER — RISPERIDONE 4 MG
1 TABLET ORAL AT BEDTIME
Refills: 0 | Status: DISCONTINUED | OUTPATIENT
Start: 2025-04-30 | End: 2025-05-01

## 2025-04-30 RX ADMIN — Medication 50 MILLIGRAM(S): at 01:30

## 2025-04-30 RX ADMIN — Medication 1 APPLICATION(S): at 17:27

## 2025-04-30 RX ADMIN — Medication 1 DROP(S): at 05:18

## 2025-04-30 RX ADMIN — GABAPENTIN 300 MILLIGRAM(S): 400 CAPSULE ORAL at 11:06

## 2025-04-30 RX ADMIN — Medication 1 DROP(S): at 17:27

## 2025-04-30 RX ADMIN — Medication 1 DROP(S): at 23:26

## 2025-04-30 RX ADMIN — Medication 1 DROP(S): at 11:07

## 2025-04-30 RX ADMIN — Medication 1 MILLIGRAM(S): at 23:24

## 2025-04-30 RX ADMIN — Medication 1 PUFF(S): at 07:24

## 2025-04-30 RX ADMIN — Medication 2 PUFF(S): at 14:59

## 2025-04-30 RX ADMIN — Medication 1 APPLICATION(S): at 05:18

## 2025-04-30 RX ADMIN — AMPICILLIN SODIUM AND SULBACTAM SODIUM 200 GRAM(S): 1; .5 INJECTION, POWDER, FOR SOLUTION INTRAMUSCULAR; INTRAVENOUS at 23:24

## 2025-04-30 RX ADMIN — Medication 1 PUFF(S): at 15:00

## 2025-04-30 RX ADMIN — Medication 2 PUFF(S): at 07:24

## 2025-04-30 RX ADMIN — Medication 1 TABLET(S): at 11:06

## 2025-04-30 RX ADMIN — Medication 1 APPLICATION(S): at 05:17

## 2025-04-30 RX ADMIN — ENOXAPARIN SODIUM 40 MILLIGRAM(S): 100 INJECTION SUBCUTANEOUS at 05:17

## 2025-04-30 RX ADMIN — AMPICILLIN SODIUM AND SULBACTAM SODIUM 200 GRAM(S): 1; .5 INJECTION, POWDER, FOR SOLUTION INTRAMUSCULAR; INTRAVENOUS at 17:28

## 2025-04-30 RX ADMIN — AMPICILLIN SODIUM AND SULBACTAM SODIUM 200 GRAM(S): 1; .5 INJECTION, POWDER, FOR SOLUTION INTRAMUSCULAR; INTRAVENOUS at 11:07

## 2025-04-30 RX ADMIN — LOSARTAN POTASSIUM 25 MILLIGRAM(S): 100 TABLET, FILM COATED ORAL at 05:17

## 2025-04-30 RX ADMIN — Medication 1000 MILLIGRAM(S): at 11:06

## 2025-04-30 RX ADMIN — Medication 2 PUFF(S): at 01:59

## 2025-04-30 RX ADMIN — AMPICILLIN SODIUM AND SULBACTAM SODIUM 200 GRAM(S): 1; .5 INJECTION, POWDER, FOR SOLUTION INTRAMUSCULAR; INTRAVENOUS at 05:17

## 2025-04-30 RX ADMIN — Medication 100 MILLIGRAM(S): at 11:06

## 2025-04-30 RX ADMIN — Medication 1 PUFF(S): at 01:59

## 2025-04-30 RX ADMIN — Medication 2 PUFF(S): at 20:05

## 2025-04-30 RX ADMIN — FOLIC ACID 1 MILLIGRAM(S): 1 TABLET ORAL at 11:06

## 2025-04-30 RX ADMIN — Medication 1 PUFF(S): at 20:05
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
## 2025-04-30 NOTE — CONSULT NOTE ADULT - PROBLEM SELECTOR RECOMMENDATION 9
- Decrease frequency of CIWA monitoring to q 2 hours; cautiously use benzodiazepines as patient is at risk for delirium   - Will reassess need for outpatient alcohol treatment once patient is able to engage in meaningful discussion  - keep Mg>2, K>4    - continue thiamine, folate and multivitamin
- Discontinue CIWA monitoring; no signs of withdrawal during interview today  - Patient declines referrals to outpatient alcohol treatment  - keep Mg>2, K>4    - continue thiamine, folate and multivitamin
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
## 2025-04-30 NOTE — BH CONSULTATION LIAISON PROGRESS NOTE - NSBHCONSULTRECOMMENDOTHER_PSY_A_CORE FT
- Start Risperidone 1mg PO QHS  - Continue Benadryl 50mg IV QHS prn sleep  - Monitor LFTs, if trending down will restart Depakote    Recommendations to nursing for non-pharmacological interventions for delirium management:  1.	Reorient Frequently   2.	Encourage Early Mobility   3.	Encourage talking to patient prior to hands on care    4.	Prevent overstimulation     5.	Prevent Day night Reversal : Out of bed at least 3 times during the day. Can be sitting in chair or walking in unit with assistance or even sitting on bed   6.	Curtains up from 8 am to 8 pm    7.	At least 6 hours of uninterrupted sleep at night    8.	Avoid Benzodiazepines, Anticholinergics and antihistaminergics   9.	Avoid Restraints : if needed utilize the least restrictive form : 1:1  <hand mitts< 2 point soft < 4 point soft < 2 point hard <  4 point hard    10.	Treat underlying cause    11.	Maintain K>4 and Mg >2 at all times
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
## 2025-05-01 LAB
ALBUMIN SERPL ELPH-MCNC: 3.7 G/DL — SIGNIFICANT CHANGE UP (ref 3.5–5.2)
ALP SERPL-CCNC: 141 U/L — HIGH (ref 30–115)
ALT FLD-CCNC: 84 U/L — HIGH (ref 0–41)
ANION GAP SERPL CALC-SCNC: 13 MMOL/L — SIGNIFICANT CHANGE UP (ref 7–14)
AST SERPL-CCNC: 30 U/L — SIGNIFICANT CHANGE UP (ref 0–41)
BILIRUB SERPL-MCNC: 0.4 MG/DL — SIGNIFICANT CHANGE UP (ref 0.2–1.2)
BUN SERPL-MCNC: 15 MG/DL — SIGNIFICANT CHANGE UP (ref 10–20)
CALCIUM SERPL-MCNC: 8.9 MG/DL — SIGNIFICANT CHANGE UP (ref 8.4–10.5)
CHLORIDE SERPL-SCNC: 96 MMOL/L — LOW (ref 98–110)
CO2 SERPL-SCNC: 25 MMOL/L — SIGNIFICANT CHANGE UP (ref 17–32)
CREAT SERPL-MCNC: <0.5 MG/DL — LOW (ref 0.7–1.5)
CULTURE RESULTS: SIGNIFICANT CHANGE UP
EGFR: 115 ML/MIN/1.73M2 — SIGNIFICANT CHANGE UP
EGFR: 115 ML/MIN/1.73M2 — SIGNIFICANT CHANGE UP
GLUCOSE SERPL-MCNC: 134 MG/DL — HIGH (ref 70–99)
HCT VFR BLD CALC: 37.7 % — SIGNIFICANT CHANGE UP (ref 37–47)
HGB BLD-MCNC: 13.1 G/DL — SIGNIFICANT CHANGE UP (ref 12–16)
MAGNESIUM SERPL-MCNC: 2 MG/DL — SIGNIFICANT CHANGE UP (ref 1.8–2.4)
MCHC RBC-ENTMCNC: 29.8 PG — SIGNIFICANT CHANGE UP (ref 27–31)
MCHC RBC-ENTMCNC: 34.7 G/DL — SIGNIFICANT CHANGE UP (ref 32–37)
MCV RBC AUTO: 85.7 FL — SIGNIFICANT CHANGE UP (ref 81–99)
NRBC BLD AUTO-RTO: 0 /100 WBCS — SIGNIFICANT CHANGE UP (ref 0–0)
PLATELET # BLD AUTO: 300 K/UL — SIGNIFICANT CHANGE UP (ref 130–400)
PMV BLD: 9.4 FL — SIGNIFICANT CHANGE UP (ref 7.4–10.4)
POTASSIUM SERPL-MCNC: 3.8 MMOL/L — SIGNIFICANT CHANGE UP (ref 3.5–5)
POTASSIUM SERPL-SCNC: 3.8 MMOL/L — SIGNIFICANT CHANGE UP (ref 3.5–5)
PROT SERPL-MCNC: 5.8 G/DL — LOW (ref 6–8)
RBC # BLD: 4.4 M/UL — SIGNIFICANT CHANGE UP (ref 4.2–5.4)
RBC # FLD: 12.6 % — SIGNIFICANT CHANGE UP (ref 11.5–14.5)
SODIUM SERPL-SCNC: 134 MMOL/L — LOW (ref 135–146)
SPECIMEN SOURCE: SIGNIFICANT CHANGE UP
WBC # BLD: 16.9 K/UL — HIGH (ref 4.8–10.8)
WBC # FLD AUTO: 16.9 K/UL — HIGH (ref 4.8–10.8)

## 2025-05-01 PROCEDURE — 71045 X-RAY EXAM CHEST 1 VIEW: CPT | Mod: 26

## 2025-05-01 PROCEDURE — 99232 SBSQ HOSP IP/OBS MODERATE 35: CPT

## 2025-05-01 PROCEDURE — 93010 ELECTROCARDIOGRAM REPORT: CPT

## 2025-05-01 RX ORDER — LACTULOSE 10 G/15ML
15 SOLUTION ORAL DAILY
Refills: 0 | Status: DISCONTINUED | OUTPATIENT
Start: 2025-05-01 | End: 2025-05-08

## 2025-05-01 RX ORDER — MELATONIN 5 MG
5 TABLET ORAL AT BEDTIME
Refills: 0 | Status: DISCONTINUED | OUTPATIENT
Start: 2025-05-01 | End: 2025-05-08

## 2025-05-01 RX ORDER — RISPERIDONE 4 MG
2 TABLET ORAL AT BEDTIME
Refills: 0 | Status: DISCONTINUED | OUTPATIENT
Start: 2025-05-01 | End: 2025-05-05

## 2025-05-01 RX ADMIN — Medication 1 TABLET(S): at 11:40

## 2025-05-01 RX ADMIN — Medication 1 APPLICATION(S): at 17:29

## 2025-05-01 RX ADMIN — ENOXAPARIN SODIUM 40 MILLIGRAM(S): 100 INJECTION SUBCUTANEOUS at 06:00

## 2025-05-01 RX ADMIN — NICOTINE POLACRILEX 1 PATCH: 4 GUM, CHEWING ORAL at 20:10

## 2025-05-01 RX ADMIN — LACTULOSE 15 GRAM(S): 10 SOLUTION ORAL at 11:40

## 2025-05-01 RX ADMIN — Medication 1 APPLICATION(S): at 06:00

## 2025-05-01 RX ADMIN — Medication 1 DROP(S): at 06:00

## 2025-05-01 RX ADMIN — Medication 1 DROP(S): at 12:16

## 2025-05-01 RX ADMIN — Medication 1 PUFF(S): at 02:32

## 2025-05-01 RX ADMIN — Medication 1 DROP(S): at 17:31

## 2025-05-01 RX ADMIN — Medication 2 MILLIGRAM(S): at 21:58

## 2025-05-01 RX ADMIN — SCOPOLAMINE 1 PATCH: 1 PATCH, EXTENDED RELEASE TRANSDERMAL at 09:49

## 2025-05-01 RX ADMIN — FOLIC ACID 1 MILLIGRAM(S): 1 TABLET ORAL at 11:40

## 2025-05-01 RX ADMIN — Medication 1 APPLICATION(S): at 17:31

## 2025-05-01 RX ADMIN — Medication 5 MILLIGRAM(S): at 21:57

## 2025-05-01 RX ADMIN — Medication 1000 MILLIGRAM(S): at 11:40

## 2025-05-01 RX ADMIN — SCOPOLAMINE 1 PATCH: 1 PATCH, EXTENDED RELEASE TRANSDERMAL at 17:33

## 2025-05-01 RX ADMIN — AMPICILLIN SODIUM AND SULBACTAM SODIUM 200 GRAM(S): 1; .5 INJECTION, POWDER, FOR SOLUTION INTRAMUSCULAR; INTRAVENOUS at 05:59

## 2025-05-01 RX ADMIN — LOSARTAN POTASSIUM 25 MILLIGRAM(S): 100 TABLET, FILM COATED ORAL at 05:59

## 2025-05-01 RX ADMIN — Medication 50 MILLIGRAM(S): at 00:15

## 2025-05-01 RX ADMIN — GABAPENTIN 300 MILLIGRAM(S): 400 CAPSULE ORAL at 11:45

## 2025-05-01 RX ADMIN — Medication 2 PUFF(S): at 02:32

## 2025-05-01 RX ADMIN — Medication 100 MILLIGRAM(S): at 11:40

## 2025-05-01 RX ADMIN — NICOTINE POLACRILEX 1 PATCH: 4 GUM, CHEWING ORAL at 11:41

## 2025-05-01 NOTE — BH CONSULTATION LIAISON PROGRESS NOTE - NSBHCONSULTRECOMMENDOTHER_PSY_A_CORE FT
- Start Risperidone 1mg PO QHS  - Continue Benadryl 50mg IV QHS prn sleep  - Monitor LFTs, if trending down will restart Depakote    Recommendations to nursing for non-pharmacological interventions for delirium management:  1.	Reorient Frequently   2.	Encourage Early Mobility   3.	Encourage talking to patient prior to hands on care    4.	Prevent overstimulation     5.	Prevent Day night Reversal : Out of bed at least 3 times during the day. Can be sitting in chair or walking in unit with assistance or even sitting on bed   6.	Curtains up from 8 am to 8 pm    7.	At least 6 hours of uninterrupted sleep at night    8.	Avoid Benzodiazepines, Anticholinergics and antihistaminergics   9.	Avoid Restraints : if needed utilize the least restrictive form : 1:1  <hand mitts< 2 point soft < 4 point soft < 2 point hard <  4 point hard    10.	Treat underlying cause    11.	Maintain K>4 and Mg >2 at all times  - Increase Risperidone 2mg PO QHS  - Monitor LFTs, if trending down will restart Depakote    Recommendations to nursing for non-pharmacological interventions for delirium management:  1.	Reorient Frequently   2.	Encourage Early Mobility   3.	Encourage talking to patient prior to hands on care    4.	Prevent overstimulation     5.	Prevent Day night Reversal : Out of bed at least 3 times during the day. Can be sitting in chair or walking in unit with assistance or even sitting on bed   6.	Curtains up from 8 am to 8 pm    7.	At least 6 hours of uninterrupted sleep at night    8.	Avoid Benzodiazepines, Anticholinergics and antihistaminergics   9.	Avoid Restraints : if needed utilize the least restrictive form : 1:1  <hand mitts< 2 point soft < 4 point soft < 2 point hard <  4 point hard    10.	Treat underlying cause    11.	Maintain K>4 and Mg >2 at all times  - Increase Risperidone 2mg PO QHS  - Monitor LFTs, if trending down will consider restarting Depakote    Recommendations to nursing for non-pharmacological interventions for delirium management:  1.	Reorient Frequently   2.	Encourage Early Mobility   3.	Encourage talking to patient prior to hands on care    4.	Prevent overstimulation     5.	Prevent Day night Reversal : Out of bed at least 3 times during the day. Can be sitting in chair or walking in unit with assistance or even sitting on bed   6.	Curtains up from 8 am to 8 pm    7.	At least 6 hours of uninterrupted sleep at night    8.	Avoid Benzodiazepines, Anticholinergics and antihistaminergics   9.	Avoid Restraints : if needed utilize the least restrictive form : 1:1  <hand mitts< 2 point soft < 4 point soft < 2 point hard <  4 point hard    10.	Treat underlying cause    11.	Maintain K>4 and Mg >2 at all times
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
## 2025-05-01 NOTE — BH CONSULTATION LIAISON PROGRESS NOTE - NSBHINDICATION_PSY_ALL_CORE
Per BICU team for medical management
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
## 2025-05-02 LAB
ALBUMIN SERPL ELPH-MCNC: 3.5 G/DL — SIGNIFICANT CHANGE UP (ref 3.5–5.2)
ALP SERPL-CCNC: 120 U/L — HIGH (ref 30–115)
ALT FLD-CCNC: 60 U/L — HIGH (ref 0–41)
ANION GAP SERPL CALC-SCNC: 14 MMOL/L — SIGNIFICANT CHANGE UP (ref 7–14)
AST SERPL-CCNC: 23 U/L — SIGNIFICANT CHANGE UP (ref 0–41)
BILIRUB SERPL-MCNC: 0.4 MG/DL — SIGNIFICANT CHANGE UP (ref 0.2–1.2)
BUN SERPL-MCNC: 24 MG/DL — HIGH (ref 10–20)
CALCIUM SERPL-MCNC: 9.2 MG/DL — SIGNIFICANT CHANGE UP (ref 8.4–10.5)
CHLORIDE SERPL-SCNC: 97 MMOL/L — LOW (ref 98–110)
CO2 SERPL-SCNC: 26 MMOL/L — SIGNIFICANT CHANGE UP (ref 17–32)
CREAT SERPL-MCNC: 0.5 MG/DL — LOW (ref 0.7–1.5)
EGFR: 102 ML/MIN/1.73M2 — SIGNIFICANT CHANGE UP
EGFR: 102 ML/MIN/1.73M2 — SIGNIFICANT CHANGE UP
GLUCOSE SERPL-MCNC: 157 MG/DL — HIGH (ref 70–99)
HCT VFR BLD CALC: 36.7 % — LOW (ref 37–47)
HGB BLD-MCNC: 12.3 G/DL — SIGNIFICANT CHANGE UP (ref 12–16)
MAGNESIUM SERPL-MCNC: 2.1 MG/DL — SIGNIFICANT CHANGE UP (ref 1.8–2.4)
MCHC RBC-ENTMCNC: 29.4 PG — SIGNIFICANT CHANGE UP (ref 27–31)
MCHC RBC-ENTMCNC: 33.5 G/DL — SIGNIFICANT CHANGE UP (ref 32–37)
MCV RBC AUTO: 87.8 FL — SIGNIFICANT CHANGE UP (ref 81–99)
NRBC BLD AUTO-RTO: 0 /100 WBCS — SIGNIFICANT CHANGE UP (ref 0–0)
PLATELET # BLD AUTO: 364 K/UL — SIGNIFICANT CHANGE UP (ref 130–400)
PMV BLD: 9.2 FL — SIGNIFICANT CHANGE UP (ref 7.4–10.4)
POTASSIUM SERPL-MCNC: 4 MMOL/L — SIGNIFICANT CHANGE UP (ref 3.5–5)
POTASSIUM SERPL-SCNC: 4 MMOL/L — SIGNIFICANT CHANGE UP (ref 3.5–5)
PROT SERPL-MCNC: 5.4 G/DL — LOW (ref 6–8)
RBC # BLD: 4.18 M/UL — LOW (ref 4.2–5.4)
RBC # FLD: 13.1 % — SIGNIFICANT CHANGE UP (ref 11.5–14.5)
SODIUM SERPL-SCNC: 137 MMOL/L — SIGNIFICANT CHANGE UP (ref 135–146)
WBC # BLD: 18.75 K/UL — HIGH (ref 4.8–10.8)
WBC # FLD AUTO: 18.75 K/UL — HIGH (ref 4.8–10.8)

## 2025-05-02 PROCEDURE — 99232 SBSQ HOSP IP/OBS MODERATE 35: CPT

## 2025-05-02 PROCEDURE — 99223 1ST HOSP IP/OBS HIGH 75: CPT

## 2025-05-02 PROCEDURE — 93010 ELECTROCARDIOGRAM REPORT: CPT

## 2025-05-02 PROCEDURE — 71045 X-RAY EXAM CHEST 1 VIEW: CPT | Mod: 26

## 2025-05-02 RX ADMIN — Medication 40 MILLIEQUIVALENT(S): at 06:46

## 2025-05-02 RX ADMIN — Medication 1 APPLICATION(S): at 05:37

## 2025-05-02 RX ADMIN — ENOXAPARIN SODIUM 40 MILLIGRAM(S): 100 INJECTION SUBCUTANEOUS at 05:36

## 2025-05-02 RX ADMIN — Medication 1 DROP(S): at 18:00

## 2025-05-02 RX ADMIN — LOSARTAN POTASSIUM 25 MILLIGRAM(S): 100 TABLET, FILM COATED ORAL at 05:36

## 2025-05-02 RX ADMIN — Medication 5 MILLIGRAM(S): at 21:20

## 2025-05-02 RX ADMIN — Medication 1 APPLICATION(S): at 18:03

## 2025-05-02 RX ADMIN — Medication 1 APPLICATION(S): at 18:01

## 2025-05-02 RX ADMIN — Medication 1 APPLICATION(S): at 05:36

## 2025-05-02 RX ADMIN — NICOTINE POLACRILEX 1 PATCH: 4 GUM, CHEWING ORAL at 11:33

## 2025-05-02 RX ADMIN — Medication 1 DROP(S): at 05:36

## 2025-05-02 RX ADMIN — NICOTINE POLACRILEX 1 PATCH: 4 GUM, CHEWING ORAL at 07:20

## 2025-05-02 RX ADMIN — Medication 2 MILLIGRAM(S): at 21:20

## 2025-05-02 RX ADMIN — Medication 1 APPLICATION(S): at 05:35
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
## 2025-05-03 LAB
ALBUMIN SERPL ELPH-MCNC: 3.2 G/DL — LOW (ref 3.5–5.2)
ALP SERPL-CCNC: 106 U/L — SIGNIFICANT CHANGE UP (ref 30–115)
ALT FLD-CCNC: 47 U/L — HIGH (ref 0–41)
ANION GAP SERPL CALC-SCNC: 11 MMOL/L — SIGNIFICANT CHANGE UP (ref 7–14)
APPEARANCE UR: CLEAR — SIGNIFICANT CHANGE UP
AST SERPL-CCNC: 19 U/L — SIGNIFICANT CHANGE UP (ref 0–41)
BILIRUB SERPL-MCNC: 0.3 MG/DL — SIGNIFICANT CHANGE UP (ref 0.2–1.2)
BILIRUB UR-MCNC: NEGATIVE — SIGNIFICANT CHANGE UP
BUN SERPL-MCNC: 20 MG/DL — SIGNIFICANT CHANGE UP (ref 10–20)
CALCIUM SERPL-MCNC: 8.5 MG/DL — SIGNIFICANT CHANGE UP (ref 8.4–10.5)
CHLORIDE SERPL-SCNC: 95 MMOL/L — LOW (ref 98–110)
CO2 SERPL-SCNC: 25 MMOL/L — SIGNIFICANT CHANGE UP (ref 17–32)
COLOR SPEC: YELLOW — SIGNIFICANT CHANGE UP
CREAT SERPL-MCNC: <0.5 MG/DL — LOW (ref 0.7–1.5)
DIFF PNL FLD: ABNORMAL
EGFR: 108 ML/MIN/1.73M2 — SIGNIFICANT CHANGE UP
EGFR: 108 ML/MIN/1.73M2 — SIGNIFICANT CHANGE UP
GLUCOSE BLDC GLUCOMTR-MCNC: 150 MG/DL — HIGH (ref 70–99)
GLUCOSE SERPL-MCNC: 116 MG/DL — HIGH (ref 70–99)
GLUCOSE UR QL: NEGATIVE MG/DL — SIGNIFICANT CHANGE UP
HCT VFR BLD CALC: 31.6 % — LOW (ref 37–47)
HGB BLD-MCNC: 10.8 G/DL — LOW (ref 12–16)
KETONES UR-MCNC: NEGATIVE MG/DL — SIGNIFICANT CHANGE UP
LEUKOCYTE ESTERASE UR-ACNC: ABNORMAL
MAGNESIUM SERPL-MCNC: 1.9 MG/DL — SIGNIFICANT CHANGE UP (ref 1.8–2.4)
MCHC RBC-ENTMCNC: 29.2 PG — SIGNIFICANT CHANGE UP (ref 27–31)
MCHC RBC-ENTMCNC: 34.2 G/DL — SIGNIFICANT CHANGE UP (ref 32–37)
MCV RBC AUTO: 85.4 FL — SIGNIFICANT CHANGE UP (ref 81–99)
NITRITE UR-MCNC: NEGATIVE — SIGNIFICANT CHANGE UP
NRBC BLD AUTO-RTO: 0 /100 WBCS — SIGNIFICANT CHANGE UP (ref 0–0)
PH UR: 6.5 — SIGNIFICANT CHANGE UP (ref 5–8)
PLATELET # BLD AUTO: 345 K/UL — SIGNIFICANT CHANGE UP (ref 130–400)
PMV BLD: 9.2 FL — SIGNIFICANT CHANGE UP (ref 7.4–10.4)
POTASSIUM SERPL-MCNC: 4 MMOL/L — SIGNIFICANT CHANGE UP (ref 3.5–5)
POTASSIUM SERPL-SCNC: 4 MMOL/L — SIGNIFICANT CHANGE UP (ref 3.5–5)
PROT SERPL-MCNC: 4.9 G/DL — LOW (ref 6–8)
PROT UR-MCNC: NEGATIVE MG/DL — SIGNIFICANT CHANGE UP
RBC # BLD: 3.7 M/UL — LOW (ref 4.2–5.4)
RBC # FLD: 13 % — SIGNIFICANT CHANGE UP (ref 11.5–14.5)
SODIUM SERPL-SCNC: 131 MMOL/L — LOW (ref 135–146)
SP GR SPEC: 1.01 — SIGNIFICANT CHANGE UP (ref 1–1.03)
UROBILINOGEN FLD QL: 0.2 MG/DL — SIGNIFICANT CHANGE UP (ref 0.2–1)
WBC # BLD: 15.42 K/UL — HIGH (ref 4.8–10.8)
WBC # FLD AUTO: 15.42 K/UL — HIGH (ref 4.8–10.8)

## 2025-05-03 PROCEDURE — 93010 ELECTROCARDIOGRAM REPORT: CPT

## 2025-05-03 PROCEDURE — 99232 SBSQ HOSP IP/OBS MODERATE 35: CPT

## 2025-05-03 RX ORDER — AMPICILLIN SODIUM AND SULBACTAM SODIUM 1; .5 G/1; G/1
INJECTION, POWDER, FOR SOLUTION INTRAMUSCULAR; INTRAVENOUS
Refills: 0 | Status: DISCONTINUED | OUTPATIENT
Start: 2025-05-03 | End: 2025-05-05

## 2025-05-03 RX ORDER — AMPICILLIN SODIUM AND SULBACTAM SODIUM 1; .5 G/1; G/1
3 INJECTION, POWDER, FOR SOLUTION INTRAMUSCULAR; INTRAVENOUS ONCE
Refills: 0 | Status: COMPLETED | OUTPATIENT
Start: 2025-05-03 | End: 2025-05-03

## 2025-05-03 RX ORDER — AMPICILLIN SODIUM AND SULBACTAM SODIUM 1; .5 G/1; G/1
3 INJECTION, POWDER, FOR SOLUTION INTRAMUSCULAR; INTRAVENOUS EVERY 6 HOURS
Refills: 0 | Status: DISCONTINUED | OUTPATIENT
Start: 2025-05-03 | End: 2025-05-05

## 2025-05-03 RX ADMIN — Medication 1 TABLET(S): at 11:29

## 2025-05-03 RX ADMIN — AMPICILLIN SODIUM AND SULBACTAM SODIUM 200 GRAM(S): 1; .5 INJECTION, POWDER, FOR SOLUTION INTRAMUSCULAR; INTRAVENOUS at 17:28

## 2025-05-03 RX ADMIN — Medication 1 DROP(S): at 00:13

## 2025-05-03 RX ADMIN — Medication 1 APPLICATION(S): at 17:28

## 2025-05-03 RX ADMIN — FOLIC ACID 1 MILLIGRAM(S): 1 TABLET ORAL at 11:29

## 2025-05-03 RX ADMIN — AMPICILLIN SODIUM AND SULBACTAM SODIUM 200 GRAM(S): 1; .5 INJECTION, POWDER, FOR SOLUTION INTRAMUSCULAR; INTRAVENOUS at 08:30

## 2025-05-03 RX ADMIN — Medication 5 MILLIGRAM(S): at 21:36

## 2025-05-03 RX ADMIN — Medication 100 MILLIGRAM(S): at 11:29

## 2025-05-03 RX ADMIN — Medication 1 APPLICATION(S): at 05:45

## 2025-05-03 RX ADMIN — LOSARTAN POTASSIUM 25 MILLIGRAM(S): 100 TABLET, FILM COATED ORAL at 05:43

## 2025-05-03 RX ADMIN — Medication 750 MILLIGRAM(S): at 21:37

## 2025-05-03 RX ADMIN — ENOXAPARIN SODIUM 40 MILLIGRAM(S): 100 INJECTION SUBCUTANEOUS at 05:44

## 2025-05-03 RX ADMIN — Medication 1 APPLICATION(S): at 05:44

## 2025-05-03 RX ADMIN — Medication 1 DROP(S): at 05:44

## 2025-05-03 RX ADMIN — AMPICILLIN SODIUM AND SULBACTAM SODIUM 200 GRAM(S): 1; .5 INJECTION, POWDER, FOR SOLUTION INTRAMUSCULAR; INTRAVENOUS at 11:29

## 2025-05-03 RX ADMIN — Medication 2 MILLIGRAM(S): at 21:37

## 2025-05-03 RX ADMIN — Medication 1000 MILLIGRAM(S): at 11:29
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
## 2025-05-03 NOTE — PROGRESS NOTE ADULT - SUBJECTIVE AND OBJECTIVE BOX
Subjective: No acute event overnight. Patient seen and examine by the bedside. Patient feeling better, c/o intermittent cough. Denies chest pain, abdominal pain, nausea, vomiting, diarrhea, fever, chills, dysuria, hematuria, blood in stools or black stool.         MEDICATIONS  STANDING MEDICATIONS  ampicillin/sulbactam  IVPB      ampicillin/sulbactam  IVPB 3 Gram(s) IV Intermittent every 6 hours  ascorbic acid 1000 milliGRAM(s) Oral daily  bacitracin   Ointment 1 Application(s) Topical two times a day  chlorhexidine 4% Liquid 1 Application(s) Topical <User Schedule>  divalproex  milliGRAM(s) Oral at bedtime  enoxaparin Injectable 40 milliGRAM(s) SubCutaneous every 24 hours  folic acid 1 milliGRAM(s) Oral daily  gabapentin 300 milliGRAM(s) Oral daily  lactulose Syrup 15 Gram(s) Oral daily  lidocaine 1% Injectable 40 milliLiter(s) Local Injection once  losartan 25 milliGRAM(s) Oral daily  melatonin 5 milliGRAM(s) Oral at bedtime  multivitamin/minerals 1 Tablet(s) Oral daily  nicotine -  14 mG/24Hr(s) Patch 1 Patch Transdermal daily  risperiDONE   Tablet 2 milliGRAM(s) Oral at bedtime  thiamine 100 milliGRAM(s) Oral daily    PRN MEDICATIONS  acetaminophen     Tablet .. 650 milliGRAM(s) Oral every 6 hours PRN  nicotine  Polacrilex Gum 2 milliGRAM(s) Oral every 2 hours PRN    VITALS:  T(F): 98.6  HR: 91  BP: 106/51  RR: 20  SpO2: 94%    PHYSICAL EXAM  GEN: Sitting comfortably, NAD, on oxygen via NC  HEENT: NC/AT, EOMI, trachea midline, no lymphadenopathy  PULM: BS heard b/l equal, No wheezing  CVS: S1/S2 present, no rubs or gallops  ABD: Soft, non-distended, no guarding; non-tender  EXT: No lower extremity edema or varicose   : No suprapubic tenderness  Skin: no rashes  NEURO: A&Ox3, speech clear, follow commands, moving all extremities    LABS                        12.3   18.75 )-----------( 364      ( 02 May 2025 11:01 )             36.7     05-02    137  |  97[L]  |  24[H]  ----------------------------<  157[H]  4.0   |  26  |  0.5[L]    Ca    9.2      02 May 2025 11:01  Mg     2.1     05-02    TPro  5.4[L]  /  Alb  3.5  /  TBili  0.4  /  DBili  x   /  AST  23  /  ALT  60[H]  /  AlkPhos  120[H]  05-02      Urinalysis Basic - ( 02 May 2025 11:01 )    Color: x / Appearance: x / SG: x / pH: x  Gluc: 157 mg/dL / Ketone: x  / Bili: x / Urobili: x   Blood: x / Protein: x / Nitrite: x   Leuk Esterase: x / RBC: x / WBC x   Sq Epi: x / Non Sq Epi: x / Bacteria: x                RADIOLOGY
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
## 2025-05-04 LAB
ALBUMIN SERPL ELPH-MCNC: 3.4 G/DL — LOW (ref 3.5–5.2)
ALP SERPL-CCNC: 114 U/L — SIGNIFICANT CHANGE UP (ref 30–115)
ALT FLD-CCNC: 50 U/L — HIGH (ref 0–41)
ANION GAP SERPL CALC-SCNC: 12 MMOL/L — SIGNIFICANT CHANGE UP (ref 7–14)
AST SERPL-CCNC: 21 U/L — SIGNIFICANT CHANGE UP (ref 0–41)
BILIRUB SERPL-MCNC: 0.2 MG/DL — SIGNIFICANT CHANGE UP (ref 0.2–1.2)
BUN SERPL-MCNC: 11 MG/DL — SIGNIFICANT CHANGE UP (ref 10–20)
CALCIUM SERPL-MCNC: 8.7 MG/DL — SIGNIFICANT CHANGE UP (ref 8.4–10.5)
CHLORIDE SERPL-SCNC: 98 MMOL/L — SIGNIFICANT CHANGE UP (ref 98–110)
CO2 SERPL-SCNC: 25 MMOL/L — SIGNIFICANT CHANGE UP (ref 17–32)
CREAT SERPL-MCNC: <0.5 MG/DL — LOW (ref 0.7–1.5)
EGFR: 108 ML/MIN/1.73M2 — SIGNIFICANT CHANGE UP
EGFR: 108 ML/MIN/1.73M2 — SIGNIFICANT CHANGE UP
GLUCOSE SERPL-MCNC: 117 MG/DL — HIGH (ref 70–99)
HCT VFR BLD CALC: 34.1 % — LOW (ref 37–47)
HGB BLD-MCNC: 11.6 G/DL — LOW (ref 12–16)
MAGNESIUM SERPL-MCNC: 1.9 MG/DL — SIGNIFICANT CHANGE UP (ref 1.8–2.4)
MCHC RBC-ENTMCNC: 29.7 PG — SIGNIFICANT CHANGE UP (ref 27–31)
MCHC RBC-ENTMCNC: 34 G/DL — SIGNIFICANT CHANGE UP (ref 32–37)
MCV RBC AUTO: 87.2 FL — SIGNIFICANT CHANGE UP (ref 81–99)
NRBC BLD AUTO-RTO: 0 /100 WBCS — SIGNIFICANT CHANGE UP (ref 0–0)
PLATELET # BLD AUTO: 409 K/UL — HIGH (ref 130–400)
PMV BLD: 9 FL — SIGNIFICANT CHANGE UP (ref 7.4–10.4)
POTASSIUM SERPL-MCNC: 4.4 MMOL/L — SIGNIFICANT CHANGE UP (ref 3.5–5)
POTASSIUM SERPL-SCNC: 4.4 MMOL/L — SIGNIFICANT CHANGE UP (ref 3.5–5)
PROT SERPL-MCNC: 5 G/DL — LOW (ref 6–8)
RBC # BLD: 3.91 M/UL — LOW (ref 4.2–5.4)
RBC # FLD: 13.2 % — SIGNIFICANT CHANGE UP (ref 11.5–14.5)
SODIUM SERPL-SCNC: 135 MMOL/L — SIGNIFICANT CHANGE UP (ref 135–146)
WBC # BLD: 12.42 K/UL — HIGH (ref 4.8–10.8)
WBC # FLD AUTO: 12.42 K/UL — HIGH (ref 4.8–10.8)

## 2025-05-04 PROCEDURE — 99232 SBSQ HOSP IP/OBS MODERATE 35: CPT

## 2025-05-04 PROCEDURE — 99231 SBSQ HOSP IP/OBS SF/LOW 25: CPT

## 2025-05-04 PROCEDURE — 93010 ELECTROCARDIOGRAM REPORT: CPT

## 2025-05-04 RX ADMIN — Medication 100 MILLIGRAM(S): at 11:10

## 2025-05-04 RX ADMIN — Medication 5 MILLIGRAM(S): at 21:15

## 2025-05-04 RX ADMIN — AMPICILLIN SODIUM AND SULBACTAM SODIUM 200 GRAM(S): 1; .5 INJECTION, POWDER, FOR SOLUTION INTRAMUSCULAR; INTRAVENOUS at 05:18

## 2025-05-04 RX ADMIN — AMPICILLIN SODIUM AND SULBACTAM SODIUM 200 GRAM(S): 1; .5 INJECTION, POWDER, FOR SOLUTION INTRAMUSCULAR; INTRAVENOUS at 11:11

## 2025-05-04 RX ADMIN — LOSARTAN POTASSIUM 25 MILLIGRAM(S): 100 TABLET, FILM COATED ORAL at 05:18

## 2025-05-04 RX ADMIN — Medication 2 MILLIGRAM(S): at 21:15

## 2025-05-04 RX ADMIN — ENOXAPARIN SODIUM 40 MILLIGRAM(S): 100 INJECTION SUBCUTANEOUS at 05:19

## 2025-05-04 RX ADMIN — Medication 1000 MILLIGRAM(S): at 11:10

## 2025-05-04 RX ADMIN — Medication 750 MILLIGRAM(S): at 21:17

## 2025-05-04 RX ADMIN — Medication 1 APPLICATION(S): at 17:09

## 2025-05-04 RX ADMIN — Medication 1 APPLICATION(S): at 05:18

## 2025-05-04 RX ADMIN — AMPICILLIN SODIUM AND SULBACTAM SODIUM 200 GRAM(S): 1; .5 INJECTION, POWDER, FOR SOLUTION INTRAMUSCULAR; INTRAVENOUS at 00:10

## 2025-05-04 RX ADMIN — Medication 1 APPLICATION(S): at 05:20

## 2025-05-04 RX ADMIN — Medication 1 TABLET(S): at 11:10

## 2025-05-04 RX ADMIN — AMPICILLIN SODIUM AND SULBACTAM SODIUM 200 GRAM(S): 1; .5 INJECTION, POWDER, FOR SOLUTION INTRAMUSCULAR; INTRAVENOUS at 23:15

## 2025-05-04 RX ADMIN — AMPICILLIN SODIUM AND SULBACTAM SODIUM 200 GRAM(S): 1; .5 INJECTION, POWDER, FOR SOLUTION INTRAMUSCULAR; INTRAVENOUS at 17:08

## 2025-05-04 RX ADMIN — FOLIC ACID 1 MILLIGRAM(S): 1 TABLET ORAL at 11:10

## 2025-05-04 NOTE — PROGRESS NOTE ADULT - SUBJECTIVE AND OBJECTIVE BOX
Subjective: No acute event overnight. Patient seen and examine by the bedside. Patient feeling better, cough improving. Denies chest pain, abdominal pain, nausea, vomiting, diarrhea, fever, chills, dysuria, hematuria, blood in stools or black stool.         MEDICATIONS  STANDING MEDICATIONS  ampicillin/sulbactam  IVPB      ampicillin/sulbactam  IVPB 3 Gram(s) IV Intermittent every 6 hours  ascorbic acid 1000 milliGRAM(s) Oral daily  bacitracin   Ointment 1 Application(s) Topical two times a day  chlorhexidine 4% Liquid 1 Application(s) Topical <User Schedule>  divalproex  milliGRAM(s) Oral at bedtime  enoxaparin Injectable 40 milliGRAM(s) SubCutaneous every 24 hours  folic acid 1 milliGRAM(s) Oral daily  gabapentin 300 milliGRAM(s) Oral daily  lactulose Syrup 15 Gram(s) Oral daily  lidocaine 1% Injectable 40 milliLiter(s) Local Injection once  losartan 25 milliGRAM(s) Oral daily  melatonin 5 milliGRAM(s) Oral at bedtime  multivitamin/minerals 1 Tablet(s) Oral daily  nicotine -  14 mG/24Hr(s) Patch 1 Patch Transdermal daily  risperiDONE   Tablet 2 milliGRAM(s) Oral at bedtime  thiamine 100 milliGRAM(s) Oral daily    PRN MEDICATIONS  acetaminophen     Tablet .. 650 milliGRAM(s) Oral every 6 hours PRN  nicotine  Polacrilex Gum 2 milliGRAM(s) Oral every 2 hours PRN    VITALS:  T(F): 98  HR: 76  BP: 114/60  RR: 18  SpO2: 94%    PHYSICAL EXAM  GEN: Sitting comfortably, NAD  HEENT: Scab on the nose, EOMI, trachea midline, no lymphadenopathy  PULM: BS heard b/l equal, No wheezing  CVS: S1/S2 present, no rubs or gallops  ABD: Soft, non-distended, no guarding; non-tender  EXT: No lower extremity edema or varicose   : No suprapubic tenderness  Skin: no rashes  NEURO: A&Ox3, speech clear, follow commands, moving all extremities    LABS                        11.6   12.42 )-----------( 409      ( 04 May 2025 11:03 )             34.1     05-03    131[L]  |  95[L]  |  20  ----------------------------<  116[H]  4.0   |  25  |  <0.5[L]    Ca    8.5      03 May 2025 12:03  Mg     1.9     05-03    TPro  4.9[L]  /  Alb  3.2[L]  /  TBili  0.3  /  DBili  x   /  AST  19  /  ALT  47[H]  /  AlkPhos  106  05-03      Urinalysis Basic - ( 03 May 2025 12:03 )    Color: x / Appearance: x / SG: x / pH: x  Gluc: 116 mg/dL / Ketone: x  / Bili: x / Urobili: x   Blood: x / Protein: x / Nitrite: x   Leuk Esterase: x / RBC: x / WBC x   Sq Epi: x / Non Sq Epi: x / Bacteria: x                RADIOLOGY
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
## 2025-05-04 NOTE — CONSULT NOTE ADULT - CONSULT REQUESTED DATE/TIME
02-May-2025 13:04
04-May-2025 13:32
29-Apr-2025 11:01
29-Apr-2025 14:05
29-Apr-2025 15:40
29-Apr-2025 14:05
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
## 2025-05-04 NOTE — CONSULT NOTE ADULT - ASSESSMENT
68yo P1 postmenopausal with monthly episodes of vaginal bleeding since stopping her risperidone, clinically and hemodynamically stable   - no acute GYN intervention   - patient declined full assessment and evaluation by GYN unable to fully assess how many tampons she has or her bleeding   - if patient is more amendable to be seen and evaluated by a GYN, please reconsult and we are more than happy to see her again   - recommend TVUS and endometrial biopsy   - bleeding precautions discussed   - patient can follow up outpatient (she declined wanting any information for our OBGYN's at [facility])  A/P 68yo P1 with reported postmenopausal vaginal bleeding, unable to fully evaluate patient due to refusal exam and limited history  - no acute GYN intervention   - patient declined full assessment and evaluation by GYN unable to fully assess how many tampons she has or her bleeding   - if patient is more amendable to be seen and evaluated by a GYN, please reconsult and we are more than happy to see her again   - recommend TVUS   - bleeding precautions discussed   - patient can follow up outpatient (she declined wanting any information for our OBGYN's at [facility])   - continue current management per primary team
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
## 2025-05-05 LAB
ALBUMIN SERPL ELPH-MCNC: 3.1 G/DL — LOW (ref 3.5–5.2)
ALP SERPL-CCNC: 100 U/L — SIGNIFICANT CHANGE UP (ref 30–115)
ALT FLD-CCNC: 38 U/L — SIGNIFICANT CHANGE UP (ref 0–41)
ANION GAP SERPL CALC-SCNC: 13 MMOL/L — SIGNIFICANT CHANGE UP (ref 7–14)
AST SERPL-CCNC: 17 U/L — SIGNIFICANT CHANGE UP (ref 0–41)
BILIRUB SERPL-MCNC: 0.2 MG/DL — SIGNIFICANT CHANGE UP (ref 0.2–1.2)
BUN SERPL-MCNC: 11 MG/DL — SIGNIFICANT CHANGE UP (ref 10–20)
CALCIUM SERPL-MCNC: 8.6 MG/DL — SIGNIFICANT CHANGE UP (ref 8.4–10.5)
CHLORIDE SERPL-SCNC: 101 MMOL/L — SIGNIFICANT CHANGE UP (ref 98–110)
CO2 SERPL-SCNC: 25 MMOL/L — SIGNIFICANT CHANGE UP (ref 17–32)
CREAT SERPL-MCNC: <0.5 MG/DL — LOW (ref 0.7–1.5)
EGFR: 115 ML/MIN/1.73M2 — SIGNIFICANT CHANGE UP
EGFR: 115 ML/MIN/1.73M2 — SIGNIFICANT CHANGE UP
GLUCOSE SERPL-MCNC: 79 MG/DL — SIGNIFICANT CHANGE UP (ref 70–99)
HCT VFR BLD CALC: 33.1 % — LOW (ref 37–47)
HGB BLD-MCNC: 11.1 G/DL — LOW (ref 12–16)
MAGNESIUM SERPL-MCNC: 1.9 MG/DL — SIGNIFICANT CHANGE UP (ref 1.8–2.4)
MCHC RBC-ENTMCNC: 29.7 PG — SIGNIFICANT CHANGE UP (ref 27–31)
MCHC RBC-ENTMCNC: 33.5 G/DL — SIGNIFICANT CHANGE UP (ref 32–37)
MCV RBC AUTO: 88.5 FL — SIGNIFICANT CHANGE UP (ref 81–99)
NRBC BLD AUTO-RTO: 0 /100 WBCS — SIGNIFICANT CHANGE UP (ref 0–0)
PLATELET # BLD AUTO: 390 K/UL — SIGNIFICANT CHANGE UP (ref 130–400)
PMV BLD: 9.2 FL — SIGNIFICANT CHANGE UP (ref 7.4–10.4)
POTASSIUM SERPL-MCNC: 4.3 MMOL/L — SIGNIFICANT CHANGE UP (ref 3.5–5)
POTASSIUM SERPL-SCNC: 4.3 MMOL/L — SIGNIFICANT CHANGE UP (ref 3.5–5)
PROT SERPL-MCNC: 5.2 G/DL — LOW (ref 6–8)
RBC # BLD: 3.74 M/UL — LOW (ref 4.2–5.4)
RBC # FLD: 13.2 % — SIGNIFICANT CHANGE UP (ref 11.5–14.5)
SODIUM SERPL-SCNC: 139 MMOL/L — SIGNIFICANT CHANGE UP (ref 135–146)
WBC # BLD: 11.2 K/UL — HIGH (ref 4.8–10.8)
WBC # FLD AUTO: 11.2 K/UL — HIGH (ref 4.8–10.8)

## 2025-05-05 PROCEDURE — 93010 ELECTROCARDIOGRAM REPORT: CPT

## 2025-05-05 PROCEDURE — 99232 SBSQ HOSP IP/OBS MODERATE 35: CPT

## 2025-05-05 RX ORDER — RISPERIDONE 4 MG
3 TABLET ORAL AT BEDTIME
Refills: 0 | Status: DISCONTINUED | OUTPATIENT
Start: 2025-05-05 | End: 2025-05-08

## 2025-05-05 RX ADMIN — Medication 1 TABLET(S): at 11:23

## 2025-05-05 RX ADMIN — LOSARTAN POTASSIUM 25 MILLIGRAM(S): 100 TABLET, FILM COATED ORAL at 05:47

## 2025-05-05 RX ADMIN — Medication 750 MILLIGRAM(S): at 21:20

## 2025-05-05 RX ADMIN — Medication 1 APPLICATION(S): at 17:23

## 2025-05-05 RX ADMIN — AMPICILLIN SODIUM AND SULBACTAM SODIUM 200 GRAM(S): 1; .5 INJECTION, POWDER, FOR SOLUTION INTRAMUSCULAR; INTRAVENOUS at 11:22

## 2025-05-05 RX ADMIN — ENOXAPARIN SODIUM 40 MILLIGRAM(S): 100 INJECTION SUBCUTANEOUS at 05:47

## 2025-05-05 RX ADMIN — FOLIC ACID 1 MILLIGRAM(S): 1 TABLET ORAL at 11:23

## 2025-05-05 RX ADMIN — Medication 100 MILLIGRAM(S): at 11:23

## 2025-05-05 RX ADMIN — Medication 1000 MILLIGRAM(S): at 11:23

## 2025-05-05 RX ADMIN — LACTULOSE 15 GRAM(S): 10 SOLUTION ORAL at 11:22

## 2025-05-05 RX ADMIN — Medication 1 APPLICATION(S): at 05:48

## 2025-05-05 RX ADMIN — AMPICILLIN SODIUM AND SULBACTAM SODIUM 200 GRAM(S): 1; .5 INJECTION, POWDER, FOR SOLUTION INTRAMUSCULAR; INTRAVENOUS at 17:22

## 2025-05-05 RX ADMIN — Medication 1 APPLICATION(S): at 05:49

## 2025-05-05 RX ADMIN — Medication 5 MILLIGRAM(S): at 21:20

## 2025-05-05 RX ADMIN — Medication 3 MILLIGRAM(S): at 21:21

## 2025-05-05 RX ADMIN — AMPICILLIN SODIUM AND SULBACTAM SODIUM 200 GRAM(S): 1; .5 INJECTION, POWDER, FOR SOLUTION INTRAMUSCULAR; INTRAVENOUS at 05:48
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
## 2025-05-05 NOTE — PROGRESS NOTE ADULT - SUBJECTIVE AND OBJECTIVE BOX
Subjective: No acute event overnight. Patient seen and examine by the bedside. Patient feeling better. Denies chest pain, SOB, cough, abdominal pain, nausea, vomiting, diarrhea, fever, chills, dysuria, hematuria, blood in stools or black stool.         MEDICATIONS  STANDING MEDICATIONS  ampicillin/sulbactam  IVPB      ampicillin/sulbactam  IVPB 3 Gram(s) IV Intermittent every 6 hours  ascorbic acid 1000 milliGRAM(s) Oral daily  bacitracin   Ointment 1 Application(s) Topical two times a day  chlorhexidine 4% Liquid 1 Application(s) Topical <User Schedule>  divalproex  milliGRAM(s) Oral at bedtime  enoxaparin Injectable 40 milliGRAM(s) SubCutaneous every 24 hours  folic acid 1 milliGRAM(s) Oral daily  gabapentin 300 milliGRAM(s) Oral daily  lactulose Syrup 15 Gram(s) Oral daily  lidocaine 1% Injectable 40 milliLiter(s) Local Injection once  losartan 25 milliGRAM(s) Oral daily  melatonin 5 milliGRAM(s) Oral at bedtime  multivitamin/minerals 1 Tablet(s) Oral daily  nicotine -  14 mG/24Hr(s) Patch 1 Patch Transdermal daily  risperiDONE   Tablet 2 milliGRAM(s) Oral at bedtime  thiamine 100 milliGRAM(s) Oral daily    PRN MEDICATIONS  acetaminophen     Tablet .. 650 milliGRAM(s) Oral every 6 hours PRN  nicotine  Polacrilex Gum 2 milliGRAM(s) Oral every 2 hours PRN    VITALS:  T(F): 98  HR: 76  BP: 114/60  RR: 18  SpO2: 94%    PHYSICAL EXAM  GEN: Sitting comfortably, NAD  HEENT: Scab on the nose, EOMI, trachea midline, no lymphadenopathy  PULM: BS heard b/l equal, No wheezing  CVS: S1/S2 present, no rubs or gallops  ABD: Soft, non-distended, no guarding; non-tender  EXT: No lower extremity edema or varicose   : No suprapubic tenderness  Skin: no rashes  NEURO: A&Ox3, speech clear, follow commands, moving all extremities    LABS                        11.6   12.42 )-----------( 409      ( 04 May 2025 11:03 )             34.1     05-03    131[L]  |  95[L]  |  20  ----------------------------<  116[H]  4.0   |  25  |  <0.5[L]    Ca    8.5      03 May 2025 12:03  Mg     1.9     05-03    TPro  4.9[L]  /  Alb  3.2[L]  /  TBili  0.3  /  DBili  x   /  AST  19  /  ALT  47[H]  /  AlkPhos  106  05-03      Urinalysis Basic - ( 03 May 2025 12:03 )    Color: x / Appearance: x / SG: x / pH: x  Gluc: 116 mg/dL / Ketone: x  / Bili: x / Urobili: x   Blood: x / Protein: x / Nitrite: x   Leuk Esterase: x / RBC: x / WBC x   Sq Epi: x / Non Sq Epi: x / Bacteria: x                RADIOLOGY
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
## 2025-05-05 NOTE — BH CONSULTATION LIAISON PROGRESS NOTE - NSBHCONSULTRECOMMENDOTHER_PSY_A_CORE FT
- Increase Risperidone to 3mg PO QHS  - Continue Depakote 750mg QHS  - Discharge planning per BICU team, discharge to IPP not appropriate at this time.    Recommendations to nursing for non-pharmacological interventions for delirium management:  1.	Reorient Frequently   2.	Encourage Early Mobility   3.	Encourage talking to patient prior to hands on care    4.	Prevent overstimulation     5.	Prevent Day night Reversal : Out of bed at least 3 times during the day. Can be sitting in chair or walking in unit with assistance or even sitting on bed   6.	Curtains up from 8 am to 8 pm    7.	At least 6 hours of uninterrupted sleep at night    8.	Avoid Benzodiazepines, Anticholinergics and antihistaminergics   9.	Avoid Restraints : if needed utilize the least restrictive form : 1:1  <hand mitts< 2 point soft < 4 point soft < 2 point hard <  4 point hard    10.	Treat underlying cause    11.	Maintain K>4 and Mg >2 at all times

## 2025-05-06 PROCEDURE — 99232 SBSQ HOSP IP/OBS MODERATE 35: CPT

## 2025-05-06 PROCEDURE — 93010 ELECTROCARDIOGRAM REPORT: CPT

## 2025-05-06 RX ADMIN — Medication 1 TABLET(S): at 11:31

## 2025-05-06 RX ADMIN — Medication 1 APPLICATION(S): at 05:46

## 2025-05-06 RX ADMIN — Medication 1 APPLICATION(S): at 17:29

## 2025-05-06 NOTE — PROGRESS NOTE ADULT - SUBJECTIVE AND OBJECTIVE BOX
Subjective: No acute event overnight. Patient seen and examine by the bedside. Patient feeling better, eating, drinking, having BM and able to walk around. Denies chest pain, abdominal pain, nausea, vomiting, diarrhea, cough, fever, chills, dysuria, hematuria, blood in stools or black stool.         MEDICATIONS  STANDING MEDICATIONS  ascorbic acid 1000 milliGRAM(s) Oral daily  bacitracin   Ointment 1 Application(s) Topical two times a day  chlorhexidine 4% Liquid 1 Application(s) Topical <User Schedule>  divalproex  milliGRAM(s) Oral at bedtime  enoxaparin Injectable 40 milliGRAM(s) SubCutaneous every 24 hours  folic acid 1 milliGRAM(s) Oral daily  gabapentin 300 milliGRAM(s) Oral daily  lactulose Syrup 15 Gram(s) Oral daily  lidocaine 1% Injectable 40 milliLiter(s) Local Injection once  losartan 25 milliGRAM(s) Oral daily  melatonin 5 milliGRAM(s) Oral at bedtime  multivitamin/minerals 1 Tablet(s) Oral daily  nicotine -  14 mG/24Hr(s) Patch 1 Patch Transdermal daily  risperiDONE   Tablet 3 milliGRAM(s) Oral at bedtime  thiamine 100 milliGRAM(s) Oral daily    PRN MEDICATIONS  acetaminophen     Tablet .. 650 milliGRAM(s) Oral every 6 hours PRN  nicotine  Polacrilex Gum 2 milliGRAM(s) Oral every 2 hours PRN    VITALS:  T(F): 98.7  HR: 85  BP: 105/53  RR: 18  SpO2: 95%    PHYSICAL EXAM  GEN: Sitting comfortably, NAD  HEENT: NC/AT, EOMI, trachea midline, no lymphadenopathy  PULM: BS heard b/l equal, No wheezing  CVS: S1/S2 present, no rubs or gallops  ABD: Soft, non-distended, no guarding; non-tender  EXT: No lower extremity edema or varicose   : No suprapubic tenderness  Skin: no rashes  NEURO: A&Ox3, speech clear, follow commands, moving all extremities    LABS                        11.1   11.20 )-----------( 390      ( 05 May 2025 11:31 )             33.1     05-05    139  |  101  |  11  ----------------------------<  79  4.3   |  25  |  <0.5[L]    Ca    8.6      05 May 2025 11:31  Mg     1.9     05-05    TPro  5.2[L]  /  Alb  3.1[L]  /  TBili  0.2  /  DBili  x   /  AST  17  /  ALT  38  /  AlkPhos  100  05-05      Urinalysis Basic - ( 05 May 2025 11:31 )    Color: x / Appearance: x / SG: x / pH: x  Gluc: 79 mg/dL / Ketone: x  / Bili: x / Urobili: x   Blood: x / Protein: x / Nitrite: x   Leuk Esterase: x / RBC: x / WBC x   Sq Epi: x / Non Sq Epi: x / Bacteria: x                RADIOLOGY
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
## 2025-05-06 NOTE — BH CONSULTATION LIAISON PROGRESS NOTE - NSBHCONSULTRECOMMENDOTHER_PSY_A_CORE FT
- Continue Risperidone to 3mg PO QHS, with the possibility of transitioning to an CORRALES prior to discharge  - Continue Depakote 750mg QHS  - Discharge planning per BICU team, discharge to IPP not appropriate at this time.    Recommendations to nursing for non-pharmacological interventions for delirium management:  1.	Reorient Frequently   2.	Encourage Early Mobility   3.	Encourage talking to patient prior to hands on care    4.	Prevent overstimulation     5.	Prevent Day night Reversal : Out of bed at least 3 times during the day. Can be sitting in chair or walking in unit with assistance or even sitting on bed   6.	Curtains up from 8 am to 8 pm    7.	At least 6 hours of uninterrupted sleep at night    8.	Avoid Benzodiazepines, Anticholinergics and antihistaminergics   9.	Avoid Restraints : if needed utilize the least restrictive form : 1:1  <hand mitts< 2 point soft < 4 point soft < 2 point hard <  4 point hard    10.	Treat underlying cause    11.	Maintain K>4 and Mg >2 at all times
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
## 2025-05-06 NOTE — BH CONSULTATION LIAISON PROGRESS NOTE - NSBHCONSULTFOLLOWAFTERCARE_PSY_A_CORE FT
- Increase Risperidone to 3mg QHS  - Outpatient psychiatric care monitoring through SNF                                                                      
- Continue Risperidone to 3mg QHS  - Outpatient psychiatric care monitoring through SNF                                                                      
- Continue Risperidone 2mg QHS
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
## 2025-05-06 NOTE — BH CONSULTATION LIAISON PROGRESS NOTE - NSBHCONSFOLLOWNEEDS_PSY_ALL_CORE
Needs further psych safety assessment prior to discharge
Needs further psych safety assessment prior to discharge
No psychiatric contraindications to discharge
Needs further psych safety assessment prior to discharge
No psychiatric contraindications to discharge
No psychiatric contraindications to discharge

## 2025-05-07 VITALS
RESPIRATION RATE: 19 BRPM | TEMPERATURE: 99 F | OXYGEN SATURATION: 98 % | DIASTOLIC BLOOD PRESSURE: 61 MMHG | SYSTOLIC BLOOD PRESSURE: 115 MMHG | HEART RATE: 88 BPM

## 2025-05-07 LAB — SARS-COV-2 RNA SPEC QL NAA+PROBE: SIGNIFICANT CHANGE UP

## 2025-05-07 PROCEDURE — 99232 SBSQ HOSP IP/OBS MODERATE 35: CPT

## 2025-05-07 PROCEDURE — 93010 ELECTROCARDIOGRAM REPORT: CPT

## 2025-05-07 RX ORDER — HALOPERIDOL 10 MG/1
5 TABLET ORAL EVERY 6 HOURS
Refills: 0 | Status: DISCONTINUED | OUTPATIENT
Start: 2025-05-07 | End: 2025-05-08

## 2025-05-07 RX ORDER — LOSARTAN POTASSIUM 100 MG/1
1 TABLET, FILM COATED ORAL
Qty: 0 | Refills: 0 | DISCHARGE
Start: 2025-05-07

## 2025-05-07 RX ORDER — HALOPERIDOL 10 MG/1
5 TABLET ORAL ONCE
Refills: 0 | Status: COMPLETED | OUTPATIENT
Start: 2025-05-07 | End: 2025-05-07

## 2025-05-07 RX ORDER — FOLIC ACID 1 MG/1
1 TABLET ORAL
Qty: 0 | Refills: 0 | DISCHARGE
Start: 2025-05-07

## 2025-05-07 RX ORDER — RISPERIDONE 4 MG
1 TABLET ORAL
Qty: 0 | Refills: 0 | DISCHARGE
Start: 2025-05-07

## 2025-05-07 NOTE — BH CONSULTATION LIAISON PROGRESS NOTE - NSICDXBHPRIMARYDX_PSY_ALL_CORE
Bipolar disorder   F31.9  
Schizoaffective disorder, bipolar type   F25.0  
Bipolar disorder   F31.9  
Schizoaffective disorder, bipolar type   F25.0  
Bipolar disorder   F31.9
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
## 2025-05-07 NOTE — BH CONSULTATION LIAISON PROGRESS NOTE - NSBHATTESTBILLING_PSY_A_CORE
28143-Slrfebgbhv OBS or IP - moderate complexity OR 35-49 mins
[id number]-Qdrhdzpqon OBS or IP - moderate complexity OR 35-49 mins
[id number]-Xjrgqvyijb OBS or IP - moderate complexity OR 35-49 mins
[id number]-Wssaexidfy OBS or IP - moderate complexity OR 35-49 mins
[id number]-Vzuvlfzsuk OBS or IP - moderate complexity OR 35-49 mins
[id number]-Jahpccmhcy OBS or IP - moderate complexity OR 35-49 mins
[id number]-Fuzxvtkclk OBS or IP - moderate complexity OR 35-49 mins

## 2025-05-07 NOTE — BH CONSULTATION LIAISON PROGRESS NOTE - NSBHMSESPEECH_PSY_A_CORE
Normal volume, rate, productivity, spontaneity and articulation
Non-verbal: unable to assess speech further
Normal volume, rate, productivity, spontaneity and articulation
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
## 2025-05-07 NOTE — BH CONSULTATION LIAISON PROGRESS NOTE - NSBHMSETHTPROC_PSY_A_CORE
Linear
Linear
Disorganized
Disorganized/Illogical/Impaired reasoning
Linear
Unable to assess
Disorganized
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
## 2025-05-07 NOTE — DISCHARGE NOTE PROVIDER - CARE PROVIDERS DIRECT ADDRESSES
[email or web address]

## 2025-05-07 NOTE — BH CONSULTATION LIAISON PROGRESS NOTE - NSICDXBHSECONDARYDX_PSY_ALL_CORE
Agitation   R45.1  

## 2025-05-07 NOTE — BH CONSULTATION LIAISON PROGRESS NOTE - NSBHPTASSESSDT_PSY_A_CORE
05-May-2025 10:50
06-May-2025 09:26
01-May-2025 11:02
07-May-2025 10:34
29-Apr-2025 11:45
02-May-2025 10:41
30-Apr-2025 13:50

## 2025-05-07 NOTE — BH CONSULTATION LIAISON PROGRESS NOTE - NSBHMSEAFFRANGE_PSY_A_CORE
Full
Unable to assess
Full/Unable to assess
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
## 2025-05-07 NOTE — DISCHARGE NOTE PROVIDER - CARE PROVIDER_API CALL
Tahir Hill  Plastic Surgery  [address] [place] [address]  Phone: [phone]  Fax: [phone]  Follow Up Time:     Vasyl Melendrez  Psychiatry  [address] [place] [id number]  Phone: [phone]  Fax: [phone]  Follow Up Time:

## 2025-05-07 NOTE — BH CONSULTATION LIAISON PROGRESS NOTE - NSBHATTESTTYPEVISIT_PSY_A_CORE
On-site Attending supervising SELENE (99XXX codes)
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
## 2025-05-07 NOTE — BH CONSULTATION LIAISON PROGRESS NOTE - NSBHCHARTREVIEWVS_PSY_A_CORE FT
Vital Signs Last 24 Hrs  T(C): 37.1 (06 May 2025 08:00), Max: 37.4 (05 May 2025 20:18)  T(F): 98.7 (06 May 2025 08:00), Max: 99.4 (05 May 2025 20:18)  HR: 85 (06 May 2025 08:00) (76 - 85)  BP: 105/53 (06 May 2025 08:00) (105/53 - 138/64)  BP(mean): 75 (06 May 2025 08:00) (75 - 94)  RR: 18 (06 May 2025 08:00) (18 - 19)  SpO2: 95% (06 May 2025 08:00) (95% - 100%)    Parameters below as of 06 May 2025 08:00  Patient On (Oxygen Delivery Method): room air    
Vital Signs Last 24 Hrs  T(C): 36.9 (29 Apr 2025 08:00), Max: 37 (29 Apr 2025 06:00)  T(F): 98.5 (29 Apr 2025 08:00), Max: 98.6 (29 Apr 2025 06:00)  HR: 101 (29 Apr 2025 10:00) (70 - 119)  BP: 139/65 (29 Apr 2025 10:00) (121/55 - 195/82)  BP(mean): 93 (29 Apr 2025 10:00) (81 - 120)  RR: 27 (29 Apr 2025 10:00) (17 - 28)  SpO2: 100% (29 Apr 2025 10:00) (94% - 100%)    Parameters below as of 29 Apr 2025 10:00  Patient On (Oxygen Delivery Method): nasal cannula  O2 Flow (L/min): 4  
Vital Signs Last 24 Hrs  T(C): 36.7 (07 May 2025 07:30), Max: 37.3 (06 May 2025 16:12)  T(F): 98.1 (07 May 2025 07:30), Max: 99.1 (06 May 2025 16:12)  HR: 98 (07 May 2025 07:30) (78 - 98)  BP: 128/73 (07 May 2025 07:30) (112/54 - 149/72)  BP(mean): 88 (06 May 2025 16:12) (88 - 88)  RR: 18 (07 May 2025 07:30) (17 - 18)  SpO2: 98% (07 May 2025 07:30) (96% - 98%)    Parameters below as of 07 May 2025 07:30  Patient On (Oxygen Delivery Method): room air    
Vital Signs Last 24 Hrs  T(C): 36.8 (01 May 2025 07:00), Max: 37.2 (30 Apr 2025 12:00)  T(F): 98.3 (01 May 2025 07:00), Max: 99 (30 Apr 2025 12:00)  HR: 104 (01 May 2025 08:00) (73 - 104)  BP: 176/81 (01 May 2025 08:00) (134/63 - 176/81)  BP(mean): 115 (01 May 2025 08:00) (91 - 115)  RR: 26 (01 May 2025 08:00) (24 - 29)  SpO2: 95% (01 May 2025 08:00) (95% - 100%)    Parameters below as of 01 May 2025 08:00  Patient On (Oxygen Delivery Method): nasal cannula    
Vital Signs Last 24 Hrs  T(C): 37.2 (02 May 2025 04:00), Max: 37.2 (02 May 2025 04:00)  T(F): 98.9 (02 May 2025 04:00), Max: 98.9 (02 May 2025 04:00)  HR: 106 (02 May 2025 08:00) (80 - 109)  BP: 115/64 (02 May 2025 08:00) (110/60 - 163/72)  BP(mean): 78 (02 May 2025 04:00) (78 - 104)  RR: 28 (02 May 2025 08:00) (24 - 39)  SpO2: 96% (02 May 2025 08:00) (96% - 97%)    Parameters below as of 02 May 2025 08:00  Patient On (Oxygen Delivery Method): nasal cannula  O2 Flow (L/min): 3  
Vital Signs Last 24 Hrs  T(C): 36.2 (05 May 2025 07:00), Max: 37.5 (04 May 2025 23:34)  T(F): 97.2 (05 May 2025 07:00), Max: 99.5 (04 May 2025 23:34)  HR: 72 (05 May 2025 07:00) (72 - 98)  BP: 110/55 (05 May 2025 07:00) (110/55 - 118/54)  BP(mean): 79 (05 May 2025 07:00) (78 - 82)  RR: 18 (05 May 2025 07:00) (18 - 18)  SpO2: 98% (05 May 2025 07:00) (95% - 98%)    Parameters below as of 05 May 2025 07:00  Patient On (Oxygen Delivery Method): room air    
Vital Signs Last 24 Hrs  T(C): 36.6 (30 Apr 2025 00:00), Max: 36.8 (29 Apr 2025 20:00)  T(F): 97.8 (30 Apr 2025 00:00), Max: 98.2 (29 Apr 2025 20:00)  HR: 78 (30 Apr 2025 07:00) (75 - 96)  BP: 151/71 (30 Apr 2025 07:00) (146/67 - 180/77)  BP(mean): 102 (30 Apr 2025 07:00) (96 - 111)  RR: 19 (30 Apr 2025 07:00) (19 - 30)  SpO2: 98% (30 Apr 2025 07:00) (93% - 100%)    Parameters below as of 30 Apr 2025 07:00  Patient On (Oxygen Delivery Method): nasal cannula  O2 Flow (L/min): 2

## 2025-05-07 NOTE — BH CONSULTATION LIAISON PROGRESS NOTE - NSBHATTESTBILLONSITE_PSY_A_CORE
SELENE to bill

## 2025-05-07 NOTE — BH CONSULTATION LIAISON PROGRESS NOTE - CURRENT MEDICATION
MEDICATIONS  (STANDING):  albuterol    90 MICROgram(s) HFA Inhaler 2 Puff(s) Inhalation every 6 hours  ampicillin/sulbactam  IVPB 3 Gram(s) IV Intermittent every 6 hours  ampicillin/sulbactam  IVPB      artificial  tears Solution 1 Drop(s) Both EYES every 6 hours  bacitracin   Ointment 1 Application(s) Topical two times a day  chlorhexidine 4% Liquid 1 Application(s) Topical <User Schedule>  enoxaparin Injectable 40 milliGRAM(s) SubCutaneous every 24 hours  folic acid 1 milliGRAM(s) Oral daily  gabapentin 300 milliGRAM(s) Oral daily  ipratropium 17 MICROgram(s) HFA Inhaler 1 Puff(s) Inhalation every 6 hours  lactated ringers. 1000 milliLiter(s) (50 mL/Hr) IV Continuous <Continuous>  lidocaine 1% Injectable 40 milliLiter(s) Local Injection once  losartan 25 milliGRAM(s) Oral daily  multivitamin 1 Tablet(s) Oral daily  petrolatum Ophthalmic Ointment 1 Application(s) Both EYES two times a day  thiamine 100 milliGRAM(s) Oral daily  valproate sodium   IVPB 250 milliGRAM(s) IV Intermittent two times a day    MEDICATIONS  (PRN):  acetaminophen     Tablet .. 650 milliGRAM(s) Oral every 6 hours PRN Temp greater or equal to 38C (100.4F)  diazepam  Injectable 10 milliGRAM(s) IV Push every 2 hours PRN CIWA 7 or greater, or seizure  diphenhydrAMINE Injectable 50 milliGRAM(s) IV Push at bedtime PRN Insomnia  haloperidol    Injectable 5 milliGRAM(s) IntraMuscular every 6 hours PRN Agitation  
MEDICATIONS  (STANDING):  ampicillin/sulbactam  IVPB      ampicillin/sulbactam  IVPB 3 Gram(s) IV Intermittent every 6 hours  ascorbic acid 1000 milliGRAM(s) Oral daily  bacitracin   Ointment 1 Application(s) Topical two times a day  chlorhexidine 4% Liquid 1 Application(s) Topical <User Schedule>  divalproex  milliGRAM(s) Oral at bedtime  enoxaparin Injectable 40 milliGRAM(s) SubCutaneous every 24 hours  folic acid 1 milliGRAM(s) Oral daily  gabapentin 300 milliGRAM(s) Oral daily  lactulose Syrup 15 Gram(s) Oral daily  lidocaine 1% Injectable 40 milliLiter(s) Local Injection once  losartan 25 milliGRAM(s) Oral daily  melatonin 5 milliGRAM(s) Oral at bedtime  multivitamin/minerals 1 Tablet(s) Oral daily  nicotine -  14 mG/24Hr(s) Patch 1 Patch Transdermal daily  risperiDONE   Tablet 2 milliGRAM(s) Oral at bedtime  thiamine 100 milliGRAM(s) Oral daily    MEDICATIONS  (PRN):  acetaminophen     Tablet .. 650 milliGRAM(s) Oral every 6 hours PRN Temp greater or equal to 38C (100.4F)  nicotine  Polacrilex Gum 2 milliGRAM(s) Oral every 2 hours PRN Smoking Cessation  
MEDICATIONS  (STANDING):  ascorbic acid 1000 milliGRAM(s) Oral daily  bacitracin   Ointment 1 Application(s) Topical two times a day  chlorhexidine 4% Liquid 1 Application(s) Topical <User Schedule>  divalproex  milliGRAM(s) Oral at bedtime  enoxaparin Injectable 40 milliGRAM(s) SubCutaneous every 24 hours  folic acid 1 milliGRAM(s) Oral daily  gabapentin 300 milliGRAM(s) Oral daily  haloperidol    Injectable 5 milliGRAM(s) IntraMuscular once  lactulose Syrup 15 Gram(s) Oral daily  lidocaine 1% Injectable 40 milliLiter(s) Local Injection once  losartan 25 milliGRAM(s) Oral daily  melatonin 5 milliGRAM(s) Oral at bedtime  multivitamin/minerals 1 Tablet(s) Oral daily  nicotine -  14 mG/24Hr(s) Patch 1 Patch Transdermal daily  risperiDONE   Tablet 3 milliGRAM(s) Oral at bedtime  thiamine 100 milliGRAM(s) Oral daily    MEDICATIONS  (PRN):  acetaminophen     Tablet .. 650 milliGRAM(s) Oral every 6 hours PRN Temp greater or equal to 38C (100.4F)  haloperidol    Injectable 5 milliGRAM(s) IntraMuscular every 6 hours PRN Agitation  nicotine  Polacrilex Gum 2 milliGRAM(s) Oral every 2 hours PRN Smoking Cessation  
MEDICATIONS  (STANDING):  artificial  tears Solution 1 Drop(s) Both EYES every 6 hours  ascorbic acid 1000 milliGRAM(s) Oral daily  bacitracin   Ointment 1 Application(s) Topical two times a day  chlorhexidine 4% Liquid 1 Application(s) Topical <User Schedule>  enoxaparin Injectable 40 milliGRAM(s) SubCutaneous every 24 hours  folic acid 1 milliGRAM(s) Oral daily  gabapentin 300 milliGRAM(s) Oral daily  lactulose Syrup 15 Gram(s) Oral daily  lidocaine 1% Injectable 40 milliLiter(s) Local Injection once  losartan 25 milliGRAM(s) Oral daily  melatonin 5 milliGRAM(s) Oral at bedtime  multivitamin/minerals 1 Tablet(s) Oral daily  nicotine -  14 mG/24Hr(s) Patch 1 Patch Transdermal daily  petrolatum Ophthalmic Ointment 1 Application(s) Both EYES two times a day  risperiDONE   Tablet 2 milliGRAM(s) Oral at bedtime  thiamine 100 milliGRAM(s) Oral daily    MEDICATIONS  (PRN):  acetaminophen     Tablet .. 650 milliGRAM(s) Oral every 6 hours PRN Temp greater or equal to 38C (100.4F)  nicotine  Polacrilex Gum 2 milliGRAM(s) Oral every 2 hours PRN Smoking Cessation  
MEDICATIONS  (STANDING):  albuterol    90 MICROgram(s) HFA Inhaler 2 Puff(s) Inhalation every 6 hours  artificial  tears Solution 1 Drop(s) Both EYES every 6 hours  ascorbic acid 1000 milliGRAM(s) Oral daily  bacitracin   Ointment 1 Application(s) Topical two times a day  chlorhexidine 4% Liquid 1 Application(s) Topical <User Schedule>  enoxaparin Injectable 40 milliGRAM(s) SubCutaneous every 24 hours  folic acid 1 milliGRAM(s) Oral daily  gabapentin 300 milliGRAM(s) Oral daily  ipratropium 17 MICROgram(s) HFA Inhaler 1 Puff(s) Inhalation every 6 hours  lactulose Syrup 15 Gram(s) Oral daily  lidocaine 1% Injectable 40 milliLiter(s) Local Injection once  losartan 25 milliGRAM(s) Oral daily  melatonin 5 milliGRAM(s) Oral at bedtime  multivitamin/minerals 1 Tablet(s) Oral daily  nicotine -  14 mG/24Hr(s) Patch 1 Patch Transdermal daily  petrolatum Ophthalmic Ointment 1 Application(s) Both EYES two times a day  risperiDONE   Tablet 1 milliGRAM(s) Oral at bedtime  thiamine 100 milliGRAM(s) Oral daily    MEDICATIONS  (PRN):  acetaminophen     Tablet .. 650 milliGRAM(s) Oral every 6 hours PRN Temp greater or equal to 38C (100.4F)  nicotine  Polacrilex Gum 2 milliGRAM(s) Oral every 2 hours PRN Smoking Cessation  
MEDICATIONS  (STANDING):  albuterol    90 MICROgram(s) HFA Inhaler 2 Puff(s) Inhalation every 6 hours  ampicillin/sulbactam  IVPB 3 Gram(s) IV Intermittent every 6 hours  ampicillin/sulbactam  IVPB      artificial  tears Solution 1 Drop(s) Both EYES every 6 hours  ascorbic acid 1000 milliGRAM(s) Oral daily  bacitracin   Ointment 1 Application(s) Topical two times a day  chlorhexidine 4% Liquid 1 Application(s) Topical <User Schedule>  enoxaparin Injectable 40 milliGRAM(s) SubCutaneous every 24 hours  folic acid 1 milliGRAM(s) Oral daily  gabapentin 300 milliGRAM(s) Oral daily  ipratropium 17 MICROgram(s) HFA Inhaler 1 Puff(s) Inhalation every 6 hours  lidocaine 1% Injectable 40 milliLiter(s) Local Injection once  losartan 25 milliGRAM(s) Oral daily  multivitamin/minerals 1 Tablet(s) Oral daily  petrolatum Ophthalmic Ointment 1 Application(s) Both EYES two times a day  risperiDONE   Tablet 1 milliGRAM(s) Oral at bedtime  thiamine 100 milliGRAM(s) Oral daily    MEDICATIONS  (PRN):  acetaminophen     Tablet .. 650 milliGRAM(s) Oral every 6 hours PRN Temp greater or equal to 38C (100.4F)  diazepam  Injectable 10 milliGRAM(s) IV Push every 2 hours PRN CIWA 7 or greater, or seizure  diphenhydrAMINE Injectable 50 milliGRAM(s) IV Push at bedtime PRN Insomnia  
MEDICATIONS  (STANDING):  ascorbic acid 1000 milliGRAM(s) Oral daily  bacitracin   Ointment 1 Application(s) Topical two times a day  chlorhexidine 4% Liquid 1 Application(s) Topical <User Schedule>  divalproex  milliGRAM(s) Oral at bedtime  enoxaparin Injectable 40 milliGRAM(s) SubCutaneous every 24 hours  folic acid 1 milliGRAM(s) Oral daily  gabapentin 300 milliGRAM(s) Oral daily  lactulose Syrup 15 Gram(s) Oral daily  lidocaine 1% Injectable 40 milliLiter(s) Local Injection once  losartan 25 milliGRAM(s) Oral daily  melatonin 5 milliGRAM(s) Oral at bedtime  multivitamin/minerals 1 Tablet(s) Oral daily  nicotine -  14 mG/24Hr(s) Patch 1 Patch Transdermal daily  risperiDONE   Tablet 3 milliGRAM(s) Oral at bedtime  thiamine 100 milliGRAM(s) Oral daily    MEDICATIONS  (PRN):  acetaminophen     Tablet .. 650 milliGRAM(s) Oral every 6 hours PRN Temp greater or equal to 38C (100.4F)  nicotine  Polacrilex Gum 2 milliGRAM(s) Oral every 2 hours PRN Smoking Cessation

## 2025-05-07 NOTE — BH CONSULTATION LIAISON PROGRESS NOTE - NSBHATTESTATTENDNAMEFT_PSY_A_CORE
Dr. Vasyl Melendrez
Dr. Melendrez
Dr. Vasyl Melendrez
Dr. Melendrez
Dr. Vasyl Melendrez

## 2025-05-07 NOTE — BH CONSULTATION LIAISON PROGRESS NOTE - NSBHMSELANG_PSY_A_CORE
No abnormalities noted
No abnormalities noted
Unable to assess
No abnormalities noted

## 2025-05-07 NOTE — BH CONSULTATION LIAISON PROGRESS NOTE - NSBHPSYCHOLCOGORIENT_PSY_A_CORE
Oriented to time, place, person, situation
Unable to assess

## 2025-05-07 NOTE — DISCHARGE NOTE PROVIDER - NSDCMRMEDTOKEN_GEN_ALL_CORE_FT
ALPRAZolam 0.25 mg oral tablet: 1 tab(s) orally once a day  ascorbic acid 1000 mg oral tablet: 1 tab(s) orally once a day  Depakote  mg oral tablet, extended release: 3 tab(s) orally once a day (at bedtime)  folic acid 1 mg oral tablet: 1 tab(s) orally once a day  gabapentin 300 mg oral tablet: 1 tab(s) orally once a day  losartan 25 mg oral tablet: 1 tab(s) orally once a day  risperiDONE 3 mg oral tablet: 1 tab(s) orally once a day (at bedtime)  thiamine 100 mg oral tablet: 1 tab(s) orally once a day   ascorbic acid 1000 mg oral tablet: 1 tab(s) orally once a day  Depakote  mg oral tablet, extended release: 3 tab(s) orally once a day (at bedtime)  folic acid 1 mg oral tablet: 1 tab(s) orally once a day  gabapentin 300 mg oral tablet: 1 tab(s) orally once a day  losartan 25 mg oral tablet: 1 tab(s) orally once a day  risperiDONE 3 mg oral tablet: 1 tab(s) orally once a day (at bedtime)  thiamine 100 mg oral tablet: 1 tab(s) orally once a day

## 2025-05-07 NOTE — BH CONSULTATION LIAISON PROGRESS NOTE - NSBHADMITIPOBS_PSY_A_CORE
Constant observation
Constant observation
Routine observation
Constant observation
Routine observation

## 2025-05-07 NOTE — BH CONSULTATION LIAISON PROGRESS NOTE - NSBHCONSULTRECOMMENDOTHER_PSY_A_CORE FT
- Give Haldol 5mg IM x 1 now for agitation  - Initiate Haldol 5mg IM Q6H prn for agitation  - Continue Risperidone to 3mg PO QHS  - Continue Depakote 750mg QHS  - Plan for IPP given patients disorganized/ delusional behavior  - Called daughter to update her and left a message. Will try to call back again later today.    Recommendations to nursing for non-pharmacological interventions for delirium management:  1.	Reorient Frequently   2.	Encourage Early Mobility   3.	Encourage talking to patient prior to hands on care    4.	Prevent overstimulation     5.	Prevent Day night Reversal : Out of bed at least 3 times during the day. Can be sitting in chair or walking in unit with assistance or even sitting on bed   6.	Curtains up from 8 am to 8 pm    7.	At least 6 hours of uninterrupted sleep at night    8.	Avoid Benzodiazepines, Anticholinergics and antihistaminergics   9.	Avoid Restraints : if needed utilize the least restrictive form : 1:1  <hand mitts< 2 point soft < 4 point soft < 2 point hard <  4 point hard    10.	Treat underlying cause    11.	Maintain K>4 and Mg >2 at all times  Keystone Flap Text: The defect edges were debeveled with a #15 scalpel blade.  Given the location of the defect, shape of the defect a keystone flap was deemed most appropriate.  Using a sterile surgical marker, an appropriate keystone flap was drawn incorporating the defect, outlining the appropriate donor tissue and placing the expected incisions within the relaxed skin tension lines where possible. The area thus outlined was incised deep to adipose tissue with a #15 scalpel blade.  The skin margins were undermined to an appropriate distance in all directions around the primary defect and laterally outward around the flap utilizing iris scissors.
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
## 2025-05-07 NOTE — BH CONSULTATION LIAISON PROGRESS NOTE - NSBHATTESTAPPBILLTIME_PSY_A_CORE
I attest my time as SELENE is greater than 50% of the total combined time spent on qualifying patient care activities. I have reviewed and verified the documentation.

## 2025-05-08 ENCOUNTER — INPATIENT (INPATIENT)
Facility: HOSPITAL | Age: 70
LOS: 46 days | Discharge: HOME | DRG: 883 | End: 2025-06-24
Attending: PSYCHIATRY & NEUROLOGY | Admitting: PSYCHIATRY & NEUROLOGY
Payer: MEDICARE

## 2025-05-08 VITALS
DIASTOLIC BLOOD PRESSURE: 78 MMHG | WEIGHT: 167.99 LBS | TEMPERATURE: 100 F | SYSTOLIC BLOOD PRESSURE: 160 MMHG | HEART RATE: 91 BPM | HEIGHT: 63 IN

## 2025-05-08 DIAGNOSIS — F60.0 PARANOID PERSONALITY DISORDER: ICD-10-CM

## 2025-05-08 PROCEDURE — 86735 MUMPS ANTIBODY: CPT

## 2025-05-08 PROCEDURE — 80061 LIPID PANEL: CPT

## 2025-05-08 PROCEDURE — 86480 TB TEST CELL IMMUN MEASURE: CPT

## 2025-05-08 PROCEDURE — 86762 RUBELLA ANTIBODY: CPT

## 2025-05-08 PROCEDURE — 80074 ACUTE HEPATITIS PANEL: CPT

## 2025-05-08 PROCEDURE — 86765 RUBEOLA ANTIBODY: CPT

## 2025-05-08 PROCEDURE — 90792 PSYCH DIAG EVAL W/MED SRVCS: CPT | Mod: GC

## 2025-05-08 PROCEDURE — 99239 HOSP IP/OBS DSCHRG MGMT >30: CPT

## 2025-05-08 PROCEDURE — 80164 ASSAY DIPROPYLACETIC ACD TOT: CPT

## 2025-05-08 PROCEDURE — 36415 COLL VENOUS BLD VENIPUNCTURE: CPT

## 2025-05-08 PROCEDURE — 84443 ASSAY THYROID STIM HORMONE: CPT

## 2025-05-08 RX ORDER — RISPERIDONE 4 MG
3 TABLET ORAL AT BEDTIME
Refills: 0 | Status: DISCONTINUED | OUTPATIENT
Start: 2025-05-08 | End: 2025-05-19

## 2025-05-08 RX ORDER — HALOPERIDOL 10 MG/1
5 TABLET ORAL EVERY 6 HOURS
Refills: 0 | Status: DISCONTINUED | OUTPATIENT
Start: 2025-05-08 | End: 2025-06-24

## 2025-05-08 RX ORDER — ALPRAZOLAM 0.5 MG
1 TABLET, EXTENDED RELEASE 24 HR ORAL
Refills: 0 | DISCHARGE

## 2025-05-08 RX ORDER — HYDROXYZINE HYDROCHLORIDE 25 MG/1
50 TABLET, FILM COATED ORAL EVERY 6 HOURS
Refills: 0 | Status: DISCONTINUED | OUTPATIENT
Start: 2025-05-08 | End: 2025-06-24

## 2025-05-08 RX ORDER — NICOTINE POLACRILEX 4 MG/1
4 GUM, CHEWING ORAL EVERY 6 HOURS
Refills: 0 | Status: DISCONTINUED | OUTPATIENT
Start: 2025-05-08 | End: 2025-05-10

## 2025-05-08 NOTE — BH INPATIENT PSYCHIATRY ASSESSMENT NOTE - NSBHMETABOLIC_PSY_ALL_CORE_FT
BMI: BMI (kg/m2): 29.8 (05-08-25 @ 14:42)  HbA1c: A1C with Estimated Average Glucose Result: 5.8 % (04-25-25 @ 04:21)    Glucose: POCT Blood Glucose.: 150 mg/dL (05-03-25 @ 08:30)    BP: 160/78 (05-08-25 @ 14:42) (160/78 - 160/78)Vital Signs Last 24 Hrs  T(C): 37.5 (05-08-25 @ 14:42), Max: 37.5 (05-08-25 @ 14:42)  T(F): 99.5 (05-08-25 @ 14:42), Max: 99.5 (05-08-25 @ 14:42)  HR: 91 (05-08-25 @ 14:42) (88 - 91)  BP: 160/78 (05-08-25 @ 14:42) (115/61 - 160/78)  BP(mean): --  RR: 19 (05-07-25 @ 16:00) (19 - 19)  SpO2: 98% (05-07-25 @ 16:00) (98% - 98%)      Lipid Panel:  BMI: BMI (kg/m2): 29.8 (05-08-25 @ 14:42)  HbA1c: A1C with Estimated Average Glucose Result: 5.8 % (04-25-25 @ 04:21)    Glucose: POCT Blood Glucose.: 150 mg/dL (05-03-25 @ 08:30)    BP: 160/78 (05-08-25 @ 14:42) (160/78 - 160/78)Vital Signs Last 24 Hrs  T(C): 37.5 (05-08-25 @ 14:42), Max: 37.5 (05-08-25 @ 14:42)  T(F): 99.5 (05-08-25 @ 14:42), Max: 99.5 (05-08-25 @ 14:42)  HR: 91 (05-08-25 @ 14:42) (91 - 91)  BP: 160/78 (05-08-25 @ 14:42) (160/78 - 160/78)  BP(mean): --  RR: --  SpO2: --      Lipid Panel:

## 2025-05-08 NOTE — BH INPATIENT PSYCHIATRY ASSESSMENT NOTE - NSBHCHARTREVIEWVS_PSY_A_CORE FT
Vital Signs Last 24 Hrs  T(C): 37.5 (05-08-25 @ 14:42), Max: 37.5 (05-08-25 @ 14:42)  T(F): 99.5 (05-08-25 @ 14:42), Max: 99.5 (05-08-25 @ 14:42)  HR: 91 (05-08-25 @ 14:42) (88 - 91)  BP: 160/78 (05-08-25 @ 14:42) (115/61 - 160/78)  BP(mean): --  RR: 19 (05-07-25 @ 16:00) (19 - 19)  SpO2: 98% (05-07-25 @ 16:00) (98% - 98%)     Vital Signs Last 24 Hrs  T(C): 37.5 (05-08-25 @ 14:42), Max: 37.5 (05-08-25 @ 14:42)  T(F): 99.5 (05-08-25 @ 14:42), Max: 99.5 (05-08-25 @ 14:42)  HR: 91 (05-08-25 @ 14:42) (91 - 91)  BP: 160/78 (05-08-25 @ 14:42) (160/78 - 160/78)  BP(mean): --  RR: --  SpO2: --

## 2025-05-08 NOTE — PROGRESS NOTE ADULT - PROVIDER SPECIALTY LIST ADULT
Addiction Medicine
Burn
Internal Medicine
Internal Medicine
Nutrition Support
Burn
Internal Medicine
Burn
Internal Medicine
Internal Medicine

## 2025-05-08 NOTE — BH INPATIENT PSYCHIATRY ASSESSMENT NOTE - NSBHCONSBHPROVDETAILS_PSY_A_CORE  FT
Attempted to call Samantha Hollingsworth, psychiatrist who stopped seeing her over 1 year ago. Found a number online but voicemail was for a different person.

## 2025-05-08 NOTE — BH PATIENT PROFILE - FALL HARM RISK - UNIVERSAL INTERVENTIONS
Bed in lowest position, wheels locked, appropriate side rails in place/Call bell, personal items and telephone in reach/Instruct patient to call for assistance before getting out of bed or chair/Non-slip footwear when patient is out of bed/[place] to call system/Physically safe environment - no spills, clutter or unnecessary equipment/Purposeful Proactive Rounding/Room/bathroom lighting operational, light cord in reach

## 2025-05-08 NOTE — BH PATIENT PROFILE - STATED REASON FOR ADMISSION
my case was closed I don't need to be here .  They are trying to use up all of my insurance because its good.

## 2025-05-08 NOTE — BH PATIENT PROFILE - HOME MEDICATIONS
losartan 25 mg oral tablet , 1 tab(s) orally once a day  thiamine 100 mg oral tablet , 1 tab(s) orally once a day  ascorbic acid 1000 mg oral tablet , 1 tab(s) orally once a day  folic acid 1 mg oral tablet , 1 tab(s) orally once a day  risperiDONE 3 mg oral tablet , 1 tab(s) orally once a day (at bedtime)  gabapentin 300 mg oral tablet , 1 tab(s) orally once a day  Depakote  mg oral tablet, extended release , 3 tab(s) orally once a day (at bedtime)

## 2025-05-08 NOTE — DISCHARGE NOTE NURSING/CASE MANAGEMENT/SOCIAL WORK - FINANCIAL ASSISTANCE
Health system provides services at a reduced cost to those who are determined to be eligible through Health system’s financial assistance program. Information regarding Health system’s financial assistance program can be found by going to [email or web address] or by calling [phone].

## 2025-05-08 NOTE — BH CHART NOTE - NSEVENTNOTEFT_PSY_ALL_CORE
Patient was given the Rights information this morning. Patient started yelling that she disagreed with it and began ripping up the document.

## 2025-05-08 NOTE — DISCHARGE NOTE NURSING/CASE MANAGEMENT/SOCIAL WORK - PATIENT PORTAL LINK FT
You can access the FollowMyHealth Patient Portal offered by [facility] by registering at the following website: [email or web address]. By joining [employer]’s FollowMyHealth portal, you will also be able to view your health information using other applications (apps) compatible with our system.

## 2025-05-08 NOTE — BH PATIENT PROFILE - BRADEN ACTIVITY
Detail Level: Simple Additional Notes: Patient consent was obtained to proceed with the visit and recommended plan of care after discussion of all risks and benefits, including the risks of COVID-19 exposure. (4) walks frequently

## 2025-05-08 NOTE — BH INPATIENT PSYCHIATRY ASSESSMENT NOTE - NSSUICPROTFACT_PSY_ALL_CORE
Responsibility to children, family, or others/Cultural, spiritual and/or moral attitudes against suicide/Quaker beliefs
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
## 2025-05-08 NOTE — BH INPATIENT PSYCHIATRY ASSESSMENT NOTE - NSTXSUBMISDATETRGT_PSY_ALL_CORE
15-May-2025 Date Of Next Injection: Other Time Frame (Enter Below) Ndc (150mg Pen): [address]-14 Consent: The risks of pain and injection site reactions were reviewed with the patient prior to the injection. Syringe Size Used (Required For Enhanced Ndc): 300mg/2ml Prefilled Pen Cosentyx Amount: 300 mg Ndc (75mg Syringe): [address]-32 Ndc (150mg Syringe): [address]-62 Additional Comments: Sample provided/injected Expiration Date (Optional): 2026/09/30 Hide Non-Enhanced Ndc Variable: No Other Time Frame Value: 2 weeks Lot # (Optional): [id number] Render If Medication Purchased By Clinic In Visit Note?: Yes Ndc (300mg Pen): [address]-48 Was The Medication Purchased By The Clinic?: No - Sample Provided J-Code:  Detail Level: Simple Administered By (Optional): GUMARO Smith

## 2025-05-08 NOTE — BH INPATIENT PSYCHIATRY ASSESSMENT NOTE - CURRENT MEDICATION
MEDICATIONS  (STANDING):    MEDICATIONS  (PRN):   MEDICATIONS  (STANDING):  divalproex  milliGRAM(s) Oral at bedtime  risperiDONE   Tablet 3 milliGRAM(s) Oral at bedtime    MEDICATIONS  (PRN):  haloperidol     Tablet 5 milliGRAM(s) Oral every 6 hours PRN agitation  hydrOXYzine hydrochloride 50 milliGRAM(s) Oral every 6 hours PRN Anxiety  nicotine  Polacrilex Gum 4 milliGRAM(s) Oral every 6 hours PRN Smoking Cessation

## 2025-05-08 NOTE — BH PATIENT PROFILE - NSDASASCORE_PSY_ALL_CORE
January 13, 2025     Patient: Kerry Amezquita  YOB: 2018  Date of Visit: 1/13/2025      To Whom it May Concern:    Kerry Amezquita is under my professional care. Kerry was seen in my office on 1/13/2025. Kerry may return to school on 1/15/2025 if fever free .    If you have any questions or concerns, please don't hesitate to call.         Sincerely,          RENETTA Ramirez        CC: No Recipients   7

## 2025-05-08 NOTE — BH INPATIENT PSYCHIATRY ASSESSMENT NOTE - RISK ASSESSMENT
Modifiable risk factors include psychosis, substance use, lack of social support.  Static risk factors include age >45.  Protective factors include parenthood, Amish, no prior suicide attempts and no access to guns.
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
## 2025-05-08 NOTE — BH INPATIENT PSYCHIATRY ASSESSMENT NOTE - NSBHATTESTCOMMENTATTENDFT_PSY_A_CORE
Interviewed the patient with the resident Dr. Cullen and the team. Patient presents with tangential t/p and odd behavior, delusions, no insight into her illness. Agree with the assessment and plan.
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
## 2025-05-08 NOTE — BH INPATIENT PSYCHIATRY ASSESSMENT NOTE - OTHER PAST PSYCHIATRIC HISTORY (INCLUDE DETAILS REGARDING ONSET, COURSE OF ILLNESS, INPATIENT/OUTPATIENT TREATMENT)
Per patient, Patient has not been in treatment for over 1 year. Previously hospitalized at [facility] in early 2000s and more recently at [facility] for 34 days
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
## 2025-05-08 NOTE — BH PATIENT PROFILE - NSVRISKLACKEMPATHY_PSY_ALL_CORE
On medications,  no chest pain, stable, monitored and followed up by primary team/cardiology team Maybe/Moderate

## 2025-05-08 NOTE — DISCHARGE NOTE NURSING/CASE MANAGEMENT/SOCIAL WORK - NSDCPEFALRISK_GEN_ALL_CORE
For information on Fall & Injury Prevention, visit: [email or web address] OR  [email or web address] OR  https://www.cdc.gov/steadi/patient.html

## 2025-05-09 PROCEDURE — 99232 SBSQ HOSP IP/OBS MODERATE 35: CPT | Mod: GC

## 2025-05-09 PROCEDURE — 99232 SBSQ HOSP IP/OBS MODERATE 35: CPT

## 2025-05-09 RX ORDER — OLANZAPINE 10 MG/1
5 TABLET ORAL ONCE
Refills: 0 | Status: DISCONTINUED | OUTPATIENT
Start: 2025-05-09 | End: 2025-05-09

## 2025-05-09 RX ORDER — HALOPERIDOL 10 MG/1
5 TABLET ORAL ONCE
Refills: 0 | Status: DISCONTINUED | OUTPATIENT
Start: 2025-05-09 | End: 2025-05-09

## 2025-05-09 NOTE — BH INPATIENT PSYCHIATRY PROGRESS NOTE - NSBHATTESTCOMMENTATTENDFT_PSY_A_CORE
Interviewed the patient with the medical student Odalys and the team. Patient remains guarded, delusional, with no insight. Some inappropriate laughter was noted as well. Patient is refusing the medications due to lack of insight and poor judgement. Agree with the assessment and plan, patient will, most likely, require TOO.
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
## 2025-05-09 NOTE — BH INPATIENT PSYCHIATRY PROGRESS NOTE - NSBHCHARTREVIEWVS_PSY_A_CORE FT
Vital Signs Last 24 Hrs  T(C): 37.5 (05-08-25 @ 14:42), Max: 37.5 (05-08-25 @ 14:42)  T(F): 99.5 (05-08-25 @ 14:42), Max: 99.5 (05-08-25 @ 14:42)  HR: 91 (05-08-25 @ 14:42) (91 - 91)  BP: 160/78 (05-08-25 @ 14:42) (160/78 - 160/78)  BP(mean): --  RR: --  SpO2: --
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
## 2025-05-09 NOTE — BH INPATIENT PSYCHIATRY PROGRESS NOTE - NSBHMETABOLIC_PSY_ALL_CORE_FT
BMI: BMI (kg/m2): 29.8 (05-08-25 @ 14:42)  HbA1c: A1C with Estimated Average Glucose Result: 5.8 % (04-25-25 @ 04:21)    Glucose: POCT Blood Glucose.: 150 mg/dL (05-03-25 @ 08:30)    BP: 160/78 (05-08-25 @ 14:42) (160/78 - 160/78)Vital Signs Last 24 Hrs  T(C): 37.5 (05-08-25 @ 14:42), Max: 37.5 (05-08-25 @ 14:42)  T(F): 99.5 (05-08-25 @ 14:42), Max: 99.5 (05-08-25 @ 14:42)  HR: 91 (05-08-25 @ 14:42) (91 - 91)  BP: 160/78 (05-08-25 @ 14:42) (160/78 - 160/78)  BP(mean): --  RR: --  SpO2: --      Lipid Panel:

## 2025-05-09 NOTE — BH INPATIENT PSYCHIATRY PROGRESS NOTE - CURRENT MEDICATION
MEDICATIONS  (STANDING):  divalproex  milliGRAM(s) Oral at bedtime  risperiDONE   Tablet 3 milliGRAM(s) Oral at bedtime    MEDICATIONS  (PRN):  haloperidol     Tablet 5 milliGRAM(s) Oral every 6 hours PRN agitation  hydrOXYzine hydrochloride 50 milliGRAM(s) Oral every 6 hours PRN Anxiety  nicotine  Polacrilex Gum 4 milliGRAM(s) Oral every 6 hours PRN Smoking Cessation

## 2025-05-10 PROCEDURE — 99232 SBSQ HOSP IP/OBS MODERATE 35: CPT | Mod: GC

## 2025-05-10 RX ORDER — NICOTINE POLACRILEX 4 MG/1
4 GUM, CHEWING ORAL
Refills: 0 | Status: DISCONTINUED | OUTPATIENT
Start: 2025-05-10 | End: 2025-05-27

## 2025-05-10 RX ADMIN — NICOTINE POLACRILEX 4 MILLIGRAM(S): 4 GUM, CHEWING ORAL at 11:25

## 2025-05-10 NOTE — BH INPATIENT PSYCHIATRY PROGRESS NOTE - NSBHATTESTCOMMENTATTENDFT_PSY_A_CORE
Interviewed the patient with the medical student Odalys and the team. Patient remains guarded, delusional, with no insight. Some inappropriate laughter was noted as well. Patient is refusing the medications due to lack of insight and poor judgement. Agree with the assessment and plan, patient will, most likely, require TOO.  Interviewed the patient with the resident Dr. Nettles. Patient presents oddly related, with inappropriate laughter, illogical statements. Agree with the assessment and plan, patient will, most likely, require TOO.

## 2025-05-10 NOTE — BH INPATIENT PSYCHIATRY PROGRESS NOTE - NSBHMETABOLIC_PSY_ALL_CORE_FT
BMI: BMI (kg/m2): 29.8 (05-08-25 @ 14:42)  HbA1c: A1C with Estimated Average Glucose Result: 5.8 % (04-25-25 @ 04:21)    Glucose: POCT Blood Glucose.: 150 mg/dL (05-03-25 @ 08:30)    BP: 129/76 (05-10-25 @ 08:00) (129/76 - 160/78)Vital Signs Last 24 Hrs  T(C): 36.8 (05-10-25 @ 08:00), Max: 36.8 (05-10-25 @ 08:00)  T(F): 98.2 (05-10-25 @ 08:00), Max: 98.2 (05-10-25 @ 08:00)  HR: 82 (05-10-25 @ 08:00) (82 - 82)  BP: 129/76 (05-10-25 @ 08:00) (129/76 - 129/76)  BP(mean): --  RR: --  SpO2: --      Lipid Panel:

## 2025-05-10 NOTE — BH INPATIENT PSYCHIATRY PROGRESS NOTE - NSBHCHARTREVIEWVS_PSY_A_CORE FT
Vital Signs Last 24 Hrs  T(C): 36.8 (05-10-25 @ 08:00), Max: 36.8 (05-10-25 @ 08:00)  T(F): 98.2 (05-10-25 @ 08:00), Max: 98.2 (05-10-25 @ 08:00)  HR: 82 (05-10-25 @ 08:00) (82 - 82)  BP: 129/76 (05-10-25 @ 08:00) (129/76 - 129/76)  BP(mean): --  RR: --  SpO2: --

## 2025-05-10 NOTE — BH INPATIENT PSYCHIATRY PROGRESS NOTE - NSBHFUPINTERVALHXFT_PSY_A_CORE
Nursing reports that patient has been irritable, refusing all of her medications, and has appeared paranoid. Per chart review the patient has not required any behavioral PRNS since the last assessment.    Chart reviewed, patient seen and evaluated in AM. On approach, patient is watching television in the TV room, but is agreeable to be interviewed in the privacy of her room. Patient reports that she currently feels "good", and requests some nicotine gum. She appears mildly irritable, continuously asking how quickly the gum order can be put in, but maintains good behavioral control. She states that at this time she feels "safe" and like no one is out to get her, but that this is because it is   Patient endorsed OK sleep and appetite.     No SI/HI/AVH elicited.  Nursing reports that patient has been irritable, refusing all of her medications, and has appeared paranoid. Per chart review the patient has not required any behavioral PRNS since the last assessment.    Chart reviewed, patient seen and evaluated in AM. On approach, patient is watching television in the TV room, but is agreeable to be interviewed in the privacy of her room. Patient reports that she currently feels "good", and requests some nicotine gum. She appears mildly irritable, continuously asking how quickly the gum order can be put in, but maintains good behavioral control. She states that at this time she feels "safe" and like no one is out to get her, but that this is because it is a Saturday, while on "Mondays, Tuesday's and Wednesdays" people do want to hurt her.    Patient endorsed OK sleep and appetite.     Pt denies any SI/HI/AVH.

## 2025-05-10 NOTE — BH INPATIENT PSYCHIATRY PROGRESS NOTE - CURRENT MEDICATION
MEDICATIONS  (STANDING):  divalproex  milliGRAM(s) Oral at bedtime  risperiDONE   Tablet 3 milliGRAM(s) Oral at bedtime    MEDICATIONS  (PRN):  haloperidol     Tablet 5 milliGRAM(s) Oral every 6 hours PRN agitation  hydrOXYzine hydrochloride 50 milliGRAM(s) Oral every 6 hours PRN Anxiety  nicotine  Polacrilex Gum 4 milliGRAM(s) Oral every 2 hours PRN Smoking Cessation
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
## 2025-05-11 RX ADMIN — NICOTINE POLACRILEX 4 MILLIGRAM(S): 4 GUM, CHEWING ORAL at 15:10

## 2025-05-11 RX ADMIN — NICOTINE POLACRILEX 4 MILLIGRAM(S): 4 GUM, CHEWING ORAL at 12:52

## 2025-05-11 RX ADMIN — NICOTINE POLACRILEX 4 MILLIGRAM(S): 4 GUM, CHEWING ORAL at 20:23

## 2025-05-11 RX ADMIN — NICOTINE POLACRILEX 4 MILLIGRAM(S): 4 GUM, CHEWING ORAL at 01:26

## 2025-05-11 NOTE — BH SAFETY PLAN - ENVIRONMENT SAFETY 1:
My environment would be safer if I consistently had therapy and medication to help me with my mental health symptoms.
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
## 2025-05-11 NOTE — BH INPATIENT PSYCHIATRY PROGRESS NOTE - NSBHMSETHTASSOC_PSY_A_CORE
Detail Level: Detailed Quality 130: Documentation Of Current Medications In The Medical Record: Current Medications Documented Quality 431: Preventive Care And Screening: Unhealthy Alcohol Use - Screening: Patient not identified as an unhealthy alcohol user when screened for unhealthy alcohol use using a systematic screening method Quality 226: Preventive Care And Screening: Tobacco Use: Screening And Cessation Intervention: Patient screened for tobacco use and is an ex/non-smoker Loose

## 2025-05-11 NOTE — BH SAFETY PLAN - WARNING SIGN 2
Another warning sign may be that I don't take medications or get therapy to help me with my behaviors and symptoms.

## 2025-05-11 NOTE — BH INPATIENT PSYCHIATRY PROGRESS NOTE - NSBHMETABOLIC_PSY_ALL_CORE_FT
BMI: BMI (kg/m2): 29.8 (05-08-25 @ 14:42)  HbA1c: A1C with Estimated Average Glucose Result: 5.8 % (04-25-25 @ 04:21)    Glucose: POCT Blood Glucose.: 150 mg/dL (05-03-25 @ 08:30)    BP: 128/74 (05-11-25 @ 05:20) (128/74 - 132/75)Vital Signs Last 24 Hrs  T(C): 37.1 (05-11-25 @ 05:20), Max: 37.1 (05-11-25 @ 05:20)  T(F): 98.8 (05-11-25 @ 05:20), Max: 98.8 (05-11-25 @ 05:20)  HR: 97 (05-11-25 @ 05:20) (97 - 97)  BP: 128/74 (05-11-25 @ 05:20) (128/74 - 128/74)  BP(mean): --  RR: --  SpO2: --      Lipid Panel:

## 2025-05-11 NOTE — BH INPATIENT PSYCHIATRY PROGRESS NOTE - NSBHFUPINTERVALHXFT_PSY_A_CORE
Nursing reports that patient has been irritable, refusing all of her medications. No acute event overnight       Chart reviewed, patient seen and evaluated  by her bed side.    Patient reports that she currently feels " normal. She appears irritable.   states that I don't want to take my medications because my case is closed  . she is preoccupied with her discharge. She is very angry that her daughter  has not called  her on mothers day.    Patient endorsed OK sleep and appetite. Pt denies any SI/HI/AVH.

## 2025-05-11 NOTE — BH INPATIENT PSYCHIATRY PROGRESS NOTE - NSBHCHARTREVIEWVS_PSY_A_CORE FT
Vital Signs Last 24 Hrs  T(C): 37.1 (05-11-25 @ 05:20), Max: 37.1 (05-11-25 @ 05:20)  T(F): 98.8 (05-11-25 @ 05:20), Max: 98.8 (05-11-25 @ 05:20)  HR: 97 (05-11-25 @ 05:20) (97 - 97)  BP: 128/74 (05-11-25 @ 05:20) (128/74 - 128/74)  BP(mean): --  RR: --  SpO2: --

## 2025-05-12 PROCEDURE — 99232 SBSQ HOSP IP/OBS MODERATE 35: CPT

## 2025-05-12 RX ADMIN — NICOTINE POLACRILEX 4 MILLIGRAM(S): 4 GUM, CHEWING ORAL at 13:27

## 2025-05-12 RX ADMIN — NICOTINE POLACRILEX 4 MILLIGRAM(S): 4 GUM, CHEWING ORAL at 01:11

## 2025-05-12 RX ADMIN — NICOTINE POLACRILEX 4 MILLIGRAM(S): 4 GUM, CHEWING ORAL at 11:38

## 2025-05-12 NOTE — UM REPORT PROGRESS NOTE - NSUMRMPROVIDER_GEN_A_CORE FT
[employer]   ID# [id number]  [phone]      5/12- Verbal clinicals provided. CM assigned to Chente HARRINGTON ([phone]). CM will call to provide auth# and coverage. [employer]   ID# [id number]  [phone]      5/12- Verbal clinicals provided. VICENTE HARRINGTON ([phone] will call to provide auth# and coverage.    5/13- Chente from [facility] called approving 7 days 5/8-5/14. Review on 5/14 with VICENTE Pacheco ([phone]). Auth# [id number]. [employer]   ID# [id number]  [phone]      5/12- Verbal clinicals provided. VICENTE HARRINGTON ([phone] will call to provide auth# and coverage.    5/13- Chente from [facility] called approving 7 days 5/8-5/14. Review on 5/14 with VICENTE Pacheco ([phone]). Auth# [id number].  5/14 SW called and left message in regards to review requesting extended stay. Awaiting determination. [employer]   ID# [id number]  [phone]      5/12- Verbal clinicals provided. VICENTE HARRINGTON ([phone] will call to provide auth# and coverage.    5/13- Chente from [facility] called approving 7 days 5/8-5/14. Review on 5/14 with VICENTE Pacheco ([phone]). Auth# [id number].  5/14 SW called and left message in regards to review requesting extended stay. Awaiting determination.  5/15 Patient received 5 additional days of coverage with lcd 5/19 and review due on that date. [employer]   ID# [id number]  [phone]      5/12- Verbal clinicals provided. VICENTE HARRINGTON ([phone] will call to provide auth# and coverage.    5/13- Chente from [facility] called approving 7 days 5/8-5/14. Review on 5/14 with VICENTE Pacheco ([phone]). Auth# [id number].  5/14 SW called and left message in regards to review requesting extended stay. Awaiting determination.  5/15 Patient received 5 additional days of coverage with lcd 5/19 and review due on that date.  5/20 SW called and left voicemail on CM awaiting determination.  [employer]   ID# [id number]  [phone]      5/12- Verbal clinicals provided. VICENTE HARRINGTON ([phone]) will call to provide auth# and coverage.    5/13- Chente from [facility] called approving 7 days 5/8-5/14. Review on 5/14 with VICENTE Pacheco ([phone]). Auth# [id number].  5/14 SW called and left message in regards to review requesting extended stay. Awaiting determination.  5/15 Patient received 5 additional days of coverage with lcd 5/19 and review due on that date.  5/20 SW called and left voicemail on CM awaiting determination. UPDATE patient given additional days of coverage with LCD 5/23 with review due on that day for continued stay.  [employer]   ID# [id number]  [phone]      5/12- Verbal clinicals provided. VICENTE HARRINGTON ([phone]) will call to provide auth# and coverage.    5/13- Chente from [facility] called approving 7 days 5/8-5/14. Review on 5/14 with VICENTE Pacheco ([phone]). Auth# [id number].  5/14 SW called and left message in regards to review requesting extended stay. Awaiting determination.  5/15 Patient received 5 additional days of coverage with lcd 5/19 and review due on that date.  5/20 SW called and left voicemail on CM awaiting determination. UPDATE patient given additional days of coverage with LCD 5/23 with review due on that day for continued stay.     5/23 SW Called in clinicals and awaiting determination. [employer]   ID# [id number]  [phone]      5/12- Verbal clinicals provided. VICENTE HARRINGTON ([phone]) will call to provide auth# and coverage.    5/13- Chente from [facility] called approving 7 days 5/8-5/14. Review on 5/14 with VICENTE Pacheco ([phone]). Auth# [id number].  5/14 SW called and left message in regards to review requesting extended stay. Awaiting determination.  5/15 Patient received 5 additional days of coverage with lcd 5/19 and review due on that date.  5/20 SW called and left voicemail on CM awaiting determination. UPDATE patient given additional days of coverage with LCD 5/23 with review due on that day for continued stay.     5/23 SW Called in clinicals and awaiting determination. UPDATE patient give 8 days of coverage review due on 5/29  [employer]   ID# [id number]  [phone]      5/12- Verbal clinicals provided. VICENTE HARRINGTON ([phone]) will call to provide auth# and coverage.    5/13- Chente from [facility] called approving 7 days 5/8-5/14. Review on 5/14 with VICENTE Pacheco ([phone]). Auth# [id number].  5/14 SW called and left message in regards to review requesting extended stay. Awaiting determination.  5/15 Patient received 5 additional days of coverage with lcd 5/19 and review due on that date.  5/20 SW called and left voicemail on CM awaiting determination. UPDATE patient given additional days of coverage with LCD 5/23 with review due on that day for continued stay.     5/23 SW Called in clinicals and awaiting determination. UPDATE patient give 8 days of coverage review due on 5/29 5/29 Receive conducted member given 4 days of coverage with LCD 6/1 and review due 6/2 [employer]   ID# [id number]  [phone]      5/12- Verbal clinicals provided. VICENTE HARRINGTON ([phone]) will call to provide auth# and coverage.    5/13- Chente from [facility] called approving 7 days 5/8-5/14. Review on 5/14 with VICENTE Pacheco ([phone]). Auth# [id number].  5/14 SW called and left message in regards to review requesting extended stay. Awaiting determination.  5/15 Patient received 5 additional days of coverage with lcd 5/19 and review due on that date.  5/20 SW called and left voicemail on CM awaiting determination. UPDATE patient given additional days of coverage with LCD 5/23 with review due on that day for continued stay.     5/23 SW Called in clinicals and awaiting determination. UPDATE patient give 8 days of coverage review due on 5/29 5/29 Receive conducted member given 4 days of coverage with LCD 6/1 and review due 6/2 6/2 (RALPH Swain) Clinical left on Tara's voicemail requesting continued stay. Awaiting determination.  [employer]   ID# [id number]  [phone]      5/12- Verbal clinicals provided. VICENTE HARRINGTON ([phone]) will call to provide auth# and coverage.    5/13- Chente from [facility] called approving 7 days 5/8-5/14. Review on 5/14 with VICENTE Pacheco ([phone]). Auth# [id number].  5/14 SW called and left message in regards to review requesting extended stay. Awaiting determination.  5/15 Patient received 5 additional days of coverage with lcd 5/19 and review due on that date.  5/20 SW called and left voicemail on CM awaiting determination. UPDATE patient given additional days of coverage with LCD 5/23 with review due on that day for continued stay.     5/23 SW Called in clinicals and awaiting determination. UPDATE patient give 8 days of coverage review due on 5/29 5/29 Receive conducted member given 4 days of coverage with LCD 6/1 and review due 6/2 6/2 (RALPH Swain) Clinical left on Tara's voicemail requesting continued stay. Awaiting determination.     6/2 (RALPH Swain) Voicemail received from ADI Pacheco and review due 6/6. [employer]   ID# [id number]  [phone]      5/12- Verbal clinicals provided. VICENTE HARRINGTON ([phone] will call to provide auth# and coverage.    5/13- Chente from [facility] called approving 7 days 5/8-5/14. Review on 5/14 with VICENTE Pacheco ([phone]). Auth# [id number].  5/14 SW called and left message in regards to review requesting extended stay. Awaiting determination.  5/15 Patient received 5 additional days of coverage with lcd 5/19 and review due on that date.  5/20 SW called and left voicemail on CM awaiting determination. UPDATE patient given additional days of coverage with LCD 5/23 with review due on that day for continued stay.     5/23 SW Called in clinicals and awaiting determination. UPDATE patient give 8 days of coverage review due on 5/29 5/29 Receive conducted member given 4 days of coverage with LCD 6/1 and review due 6/2 6/2 (RALPH Swain) Clinical left on Tara's voicemail requesting continued stay. Awaiting determination.     6/2 (RALPH Swain) Voicemail received from ADI Pacheco and review due 6/6.    6/11 RALPH conducted review and is awaiting determination. [employer]   ID# [id number]  [phone]      5/12- Verbal clinicals provided. VICENTE HARRINGTON ([phone]) will call to provide auth# and coverage.    5/13- Chente from [facility] called approving 7 days 5/8-5/14. Review on 5/14 with VICENTE Pacheco ([phone]). Auth# [id number].  5/14 SW called and left message in regards to review requesting extended stay. Awaiting determination.  5/15 Patient received 5 additional days of coverage with lcd 5/19 and review due on that date.  5/20 SW called and left voicemail on CM awaiting determination. UPDATE patient given additional days of coverage with LCD 5/23 with review due on that day for continued stay.     5/23 SW Called in clinicals and awaiting determination. UPDATE patient give 8 days of coverage review due on 5/29 5/29 Receive conducted member given 4 days of coverage with LCD 6/1 and review due 6/2 6/2 (RALPH Swain) Clinical left on Tara's voicemail requesting continued stay. Awaiting determination.     6/2 (RALPH Swain) Voicemail received from Tara, NEILD and review due 6/6.    6/11 RALPH conducted review and is awaiting determination. UPDATE RALPH informed that the patient was discharged and that an appeal would need to be made. RALPH called and filed an urgent appeal 9--2363 [employer]   ID# [id number]  [phone]      5/12- Verbal clinicals provided. VICENTE HARRINGTON ([phone] will call to provide auth# and coverage.    5/13- Chente from [facility] called approving 7 days 5/8-5/14. Review on 5/14 with VICENTE Pacheco ([phone]). Auth# [id number].  5/14 SW called and left message in regards to review requesting extended stay. Awaiting determination.  5/15 Patient received 5 additional days of coverage with lcd 5/19 and review due on that date.  5/20 SW called and left voicemail on CM awaiting determination. UPDATE patient given additional days of coverage with LCD 5/23 with review due on that day for continued stay.     5/23 SW Called in clinicals and awaiting determination. UPDATE patient give 8 days of coverage review due on 5/29 5/29 Receive conducted member given 4 days of coverage with LCD 6/1 and review due 6/2 6/2 (RALPH Swain) Clinical left on Tara's voicemail requesting continued stay. Awaiting determination.     6/2 (RALPH Swain) Voicemail received from Tara, NEILD and review due 6/6.    6/11 RALPH conducted review and is awaiting determination. UPDATE RALPH informed that the patient was discharged and that an appeal would need to be made. RALPH called and filed an urgent appeal 4--6229. 72hr time for a determination on appeal.  [employer]   ID# [id number]  [phone]      5/12- Verbal clinicals provided. VICENTE HARRINGTON ([phone]) will call to provide auth# and coverage.    5/13- Chente from [facility] called approving 7 days 5/8-5/14. Review on 5/14 with VICENTE Pacheco ([phone]). Auth# [id number].  5/14 SW called and left message in regards to review requesting extended stay. Awaiting determination.  5/15 Patient received 5 additional days of coverage with lcd 5/19 and review due on that date.  5/20 SW called and left voicemail on CM awaiting determination. UPDATE patient given additional days of coverage with LCD 5/23 with review due on that day for continued stay.     5/23 SW Called in clinicals and awaiting determination. UPDATE patient give 8 days of coverage review due on 5/29 5/29 Receive conducted member given 4 days of coverage with LCD 6/1 and review due 6/2 6/2 (RALPH Swain) Clinical left on Tara's voicemail requesting continued stay. Awaiting determination.     6/2 (RALPH Swain) Voicemail received from Tara, NEILD and review due 6/6.    6/11 SW conducted review and is awaiting determination. UPDATE SW informed that the patient was discharged and that an appeal would need to be made. RALPH called and filed an urgent appeal 9--[phone]. 72hr time for a determination on appeal.   6/16 RALPH follow up on appeal no information available  6/17 RALPH follow up on appeal reference ending #[id number] call disconnected RALPH called again and got a new reference number ending in #[phone]  informed that the appeal was denied a letter was mailed to the patient. RALPH did not receive a phone call in regards to providing any additional information.

## 2025-05-12 NOTE — BH INPATIENT PSYCHIATRY PROGRESS NOTE - NSBHMETABOLIC_PSY_ALL_CORE_FT
BMI: BMI (kg/m2): 29.8 (05-08-25 @ 14:42)  HbA1c: A1C with Estimated Average Glucose Result: 5.8 % (04-25-25 @ 04:21)    Glucose: POCT Blood Glucose.: 150 mg/dL (05-03-25 @ 08:30)    BP: 111/61 (05-12-25 @ 09:52) (111/61 - 132/75)Vital Signs Last 24 Hrs  T(C): 36.9 (05-12-25 @ 09:52), Max: 37.6 (05-11-25 @ 16:10)  T(F): 98.5 (05-12-25 @ 09:52), Max: 99.7 (05-11-25 @ 16:10)  HR: 79 (05-12-25 @ 09:52) (79 - 94)  BP: 111/61 (05-12-25 @ 09:52) (111/61 - 127/73)  BP(mean): --  RR: --  SpO2: --      Lipid Panel:  BMI: BMI (kg/m2): 29.8 (05-08-25 @ 14:42)  HbA1c: A1C with Estimated Average Glucose Result: 5.8 % (04-25-25 @ 04:21)    Glucose: POCT Blood Glucose.: 150 mg/dL (05-03-25 @ 08:30)    BP: 134/76 (05-12-25 @ 15:46) (111/61 - 134/76)Vital Signs Last 24 Hrs  T(C): 37.2 (05-12-25 @ 15:46), Max: 37.2 (05-12-25 @ 15:46)  T(F): 99 (05-12-25 @ 15:46), Max: 99 (05-12-25 @ 15:46)  HR: 82 (05-12-25 @ 15:46) (79 - 82)  BP: 134/76 (05-12-25 @ 15:46) (111/61 - 134/76)  BP(mean): --  RR: --  SpO2: --      Lipid Panel:
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
## 2025-05-12 NOTE — BH INPATIENT PSYCHIATRY PROGRESS NOTE - NSBHCHARTREVIEWVS_PSY_A_CORE FT
Vital Signs Last 24 Hrs  T(C): 36.9 (05-12-25 @ 09:52), Max: 37.6 (05-11-25 @ 16:10)  T(F): 98.5 (05-12-25 @ 09:52), Max: 99.7 (05-11-25 @ 16:10)  HR: 79 (05-12-25 @ 09:52) (79 - 94)  BP: 111/61 (05-12-25 @ 09:52) (111/61 - 127/73)  BP(mean): --  RR: --  SpO2: --     Vital Signs Last 24 Hrs  T(C): 37.2 (05-12-25 @ 15:46), Max: 37.2 (05-12-25 @ 15:46)  T(F): 99 (05-12-25 @ 15:46), Max: 99 (05-12-25 @ 15:46)  HR: 82 (05-12-25 @ 15:46) (79 - 82)  BP: 134/76 (05-12-25 @ 15:46) (111/61 - 134/76)  BP(mean): --  RR: --  SpO2: --

## 2025-05-12 NOTE — BH INPATIENT PSYCHIATRY PROGRESS NOTE - NSBHATTESTCOMMENTATTENDFT_PSY_A_CORE
Interviewed the patient with the medical student Odalys. Patient presents oddly related, denies si/hi/ah/vh, observed gesturing during the interview. Agree with the assessment and plan, will apply for TOO.

## 2025-05-13 PROCEDURE — 99232 SBSQ HOSP IP/OBS MODERATE 35: CPT | Mod: GC

## 2025-05-13 RX ADMIN — NICOTINE POLACRILEX 4 MILLIGRAM(S): 4 GUM, CHEWING ORAL at 08:37

## 2025-05-13 RX ADMIN — NICOTINE POLACRILEX 4 MILLIGRAM(S): 4 GUM, CHEWING ORAL at 06:16

## 2025-05-13 NOTE — BH INPATIENT PSYCHIATRY PROGRESS NOTE - NSBHCHARTREVIEWVS_PSY_A_CORE FT
Vital Signs Last 24 Hrs  T(C): 36.7 (05-13-25 @ 08:54), Max: 37.2 (05-12-25 @ 15:46)  T(F): 98 (05-13-25 @ 08:54), Max: 99 (05-12-25 @ 15:46)  HR: 92 (05-13-25 @ 08:54) (82 - 92)  BP: 114/78 (05-13-25 @ 08:54) (114/78 - 134/76)  BP(mean): --  RR: --  SpO2: --     Vital Signs Last 24 Hrs  T(C): 37.3 (05-13-25 @ 16:26), Max: 37.3 (05-13-25 @ 16:26)  T(F): 99.1 (05-13-25 @ 16:26), Max: 99.1 (05-13-25 @ 16:26)  HR: 83 (05-13-25 @ 16:26) (83 - 92)  BP: 125/73 (05-13-25 @ 16:26) (114/78 - 125/73)  BP(mean): --  RR: --  SpO2: --

## 2025-05-13 NOTE — BH INPATIENT PSYCHIATRY PROGRESS NOTE - NSBHATTESTCOMMENTATTENDFT_PSY_A_CORE
Interviewed the patient with the resident Dr. Myles. Patient continues to refuse the medications and the presentation remains the same. Agree with the assessment and plan, will proceed with TOO application.
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
## 2025-05-13 NOTE — BH INPATIENT PSYCHIATRY PROGRESS NOTE - NSBHMETABOLIC_PSY_ALL_CORE_FT
BMI: BMI (kg/m2): 29.8 (05-08-25 @ 14:42)  HbA1c: A1C with Estimated Average Glucose Result: 5.8 % (04-25-25 @ 04:21)    Glucose: POCT Blood Glucose.: 150 mg/dL (05-03-25 @ 08:30)    BP: 114/78 (05-13-25 @ 08:54) (111/61 - 134/76)Vital Signs Last 24 Hrs  T(C): 36.7 (05-13-25 @ 08:54), Max: 37.2 (05-12-25 @ 15:46)  T(F): 98 (05-13-25 @ 08:54), Max: 99 (05-12-25 @ 15:46)  HR: 92 (05-13-25 @ 08:54) (82 - 92)  BP: 114/78 (05-13-25 @ 08:54) (114/78 - 134/76)  BP(mean): --  RR: --  SpO2: --      Lipid Panel:  BMI: BMI (kg/m2): 29.8 (05-08-25 @ 14:42)  HbA1c: A1C with Estimated Average Glucose Result: 5.8 % (04-25-25 @ 04:21)    Glucose: POCT Blood Glucose.: 150 mg/dL (05-03-25 @ 08:30)    BP: 125/73 (05-13-25 @ 16:26) (111/61 - 134/76)Vital Signs Last 24 Hrs  T(C): 37.3 (05-13-25 @ 16:26), Max: 37.3 (05-13-25 @ 16:26)  T(F): 99.1 (05-13-25 @ 16:26), Max: 99.1 (05-13-25 @ 16:26)  HR: 83 (05-13-25 @ 16:26) (83 - 92)  BP: 125/73 (05-13-25 @ 16:26) (114/78 - 125/73)  BP(mean): --  RR: --  SpO2: --      Lipid Panel:
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
## 2025-05-14 PROCEDURE — 99232 SBSQ HOSP IP/OBS MODERATE 35: CPT | Mod: GC

## 2025-05-14 RX ADMIN — NICOTINE POLACRILEX 4 MILLIGRAM(S): 4 GUM, CHEWING ORAL at 03:42

## 2025-05-14 RX ADMIN — NICOTINE POLACRILEX 4 MILLIGRAM(S): 4 GUM, CHEWING ORAL at 09:50

## 2025-05-14 RX ADMIN — HYDROXYZINE HYDROCHLORIDE 50 MILLIGRAM(S): 25 TABLET, FILM COATED ORAL at 05:10

## 2025-05-14 NOTE — BH INPATIENT PSYCHIATRY PROGRESS NOTE - NSBHMETABOLIC_PSY_ALL_CORE_FT
BMI: BMI (kg/m2): 29.8 (05-08-25 @ 14:42)  HbA1c: A1C with Estimated Average Glucose Result: 5.8 % (04-25-25 @ 04:21)    Glucose: POCT Blood Glucose.: 150 mg/dL (05-03-25 @ 08:30)    BP: 118/77 (05-14-25 @ 08:00) (111/61 - 134/76)Vital Signs Last 24 Hrs  T(C): 36.4 (05-14-25 @ 08:00), Max: 37.3 (05-13-25 @ 16:26)  T(F): 97.6 (05-14-25 @ 08:00), Max: 99.1 (05-13-25 @ 16:26)  HR: 90 (05-14-25 @ 08:00) (83 - 90)  BP: 118/77 (05-14-25 @ 08:00) (118/77 - 125/73)  BP(mean): --  RR: --  SpO2: --      Lipid Panel:
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
## 2025-05-14 NOTE — BH INPATIENT PSYCHIATRY PROGRESS NOTE - NSBHCHARTREVIEWVS_PSY_A_CORE FT
Vital Signs Last 24 Hrs  T(C): 36.4 (05-14-25 @ 08:00), Max: 37.3 (05-13-25 @ 16:26)  T(F): 97.6 (05-14-25 @ 08:00), Max: 99.1 (05-13-25 @ 16:26)  HR: 90 (05-14-25 @ 08:00) (83 - 90)  BP: 118/77 (05-14-25 @ 08:00) (118/77 - 125/73)  BP(mean): --  RR: --  SpO2: --

## 2025-05-14 NOTE — BH INPATIENT PSYCHIATRY PROGRESS NOTE - NSBHATTESTCOMMENTATTENDFT_PSY_A_CORE
Interviewed the patient with the medical student Odalys. Patient remains oddly related and delusional. She continues to refuse her standing medications. Will proceed with TOO. Agree with the assessment and plan.
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
## 2025-05-15 PROCEDURE — 99232 SBSQ HOSP IP/OBS MODERATE 35: CPT | Mod: GC

## 2025-05-15 NOTE — BH INPATIENT PSYCHIATRY PROGRESS NOTE - NSBHMETABOLIC_PSY_ALL_CORE_FT
BMI: BMI (kg/m2): 29.8 (05-08-25 @ 14:42)  HbA1c: A1C with Estimated Average Glucose Result: 5.8 % (04-25-25 @ 04:21)    Glucose: POCT Blood Glucose.: 150 mg/dL (05-03-25 @ 08:30)    BP: 114/75 (05-14-25 @ 16:02) (114/75 - 134/76)Vital Signs Last 24 Hrs  T(C): 36.8 (05-14-25 @ 16:02), Max: 36.8 (05-14-25 @ 16:02)  T(F): 98.3 (05-14-25 @ 16:02), Max: 98.3 (05-14-25 @ 16:02)  HR: 76 (05-14-25 @ 16:02) (76 - 76)  BP: 114/75 (05-14-25 @ 16:02) (114/75 - 114/75)  BP(mean): --  RR: --  SpO2: --      Lipid Panel:  BMI: BMI (kg/m2): 29.8 (05-08-25 @ 14:42)  HbA1c: A1C with Estimated Average Glucose Result: 5.8 % (04-25-25 @ 04:21)    Glucose: POCT Blood Glucose.: 150 mg/dL (05-03-25 @ 08:30)    BP: 101/55 (05-15-25 @ 15:58) (101/55 - 125/73)Vital Signs Last 24 Hrs  T(C): 36.9 (05-15-25 @ 15:58), Max: 36.9 (05-15-25 @ 15:58)  T(F): 98.5 (05-15-25 @ 15:58), Max: 98.5 (05-15-25 @ 15:58)  HR: 76 (05-15-25 @ 15:58) (76 - 76)  BP: 101/55 (05-15-25 @ 15:58) (101/55 - 101/55)  BP(mean): --  RR: --  SpO2: --      Lipid Panel:
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
## 2025-05-15 NOTE — CDI QUERY NOTE - NSCDIOTHERTXTBX_GEN_ALL_CORE_HH
Clinical documentation and/or evidence of the patient’s presentation, evaluation, and medical management, as evidenced below, may support a diagnosis that is not documented in the medical record.  In order to ensure accurate coding and accuracy of the clinical record, the documentation in this patient’s medical record requires additional clarification.      If you think the supporting documentation and/or clinical evidence supports a more specific diagnosis, please include more specific documentation of a diagnosis associated with these findings in your progress note and/or discharge summary.    	  Please clarify if the bronchoscopy to the terminal bronchioles with partial removal of black soot can be further specified as:  •	Bronchoscopy to the terminal bronchioles included partial removal of black soot from the lungs  •	Bronchoscopy to the terminal bronchioles included partial removal of black soot from the bronchus only  •	Other (specify)      Supporting documentation and/or clinical evidence:   •	4-25 Bronchoscopy Note… bronchoscope was placed, black soot was noted in the endotracheal tube as well as the upper distal airway. Bronchoscopy to the terminal bronchioles, lidocaine 1% plain and normal saline flush and aspirate with partial removal of black soot. Clinical documentation and/or evidence of the patient’s presentation, evaluation, and medical management, as evidenced below, may support a diagnosis that is not documented in the medical record.  In order to ensure accurate coding and accuracy of the clinical record, the documentation in this patient’s medical record requires additional clarification.      If you think the supporting documentation and/or clinical evidence supports a more specific diagnosis, please include more specific documentation of a diagnosis associated with these findings in your progress note and/or discharge summary.    	  Please clarify if the bronchoscopy to the terminal bronchioles with partial removal of black soot can be further specified as:  •	Bronchoscopy to the terminal bronchioles included partial removal of black soot from the alveoli  •	Bronchoscopy to the terminal bronchioles included partial removal of black soot from the bronchus only  •	Other (specify)      Supporting documentation and/or clinical evidence:   •	4-25 Bronchoscopy Note… bronchoscope was placed, black soot was noted in the endotracheal tube as well as the upper distal airway. Bronchoscopy to the terminal bronchioles, lidocaine 1% plain and normal saline flush and aspirate with partial removal of black soot.
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
## 2025-05-15 NOTE — BH INPATIENT PSYCHIATRY PROGRESS NOTE - NSBHCHARTREVIEWVS_PSY_A_CORE FT
Vital Signs Last 24 Hrs  T(C): 36.8 (05-14-25 @ 16:02), Max: 36.8 (05-14-25 @ 16:02)  T(F): 98.3 (05-14-25 @ 16:02), Max: 98.3 (05-14-25 @ 16:02)  HR: 76 (05-14-25 @ 16:02) (76 - 76)  BP: 114/75 (05-14-25 @ 16:02) (114/75 - 114/75)  BP(mean): --  RR: --  SpO2: --     Vital Signs Last 24 Hrs  T(C): 36.9 (05-15-25 @ 15:58), Max: 36.9 (05-15-25 @ 15:58)  T(F): 98.5 (05-15-25 @ 15:58), Max: 98.5 (05-15-25 @ 15:58)  HR: 76 (05-15-25 @ 15:58) (76 - 76)  BP: 101/55 (05-15-25 @ 15:58) (101/55 - 101/55)  BP(mean): --  RR: --  SpO2: --

## 2025-05-15 NOTE — BH INPATIENT PSYCHIATRY PROGRESS NOTE - NSBHATTESTCOMMENTATTENDFT_PSY_A_CORE
Interviewed the patient with the medical student Odalys and resident Dr. Myles. Patient presents oddly related, with persecutory thoughts and no insight. She continues to refuse the medications so the presentation remains essentially the same. Agree with the assessment and plan.

## 2025-05-16 PROCEDURE — 99232 SBSQ HOSP IP/OBS MODERATE 35: CPT | Mod: GC

## 2025-05-16 NOTE — BH INPATIENT PSYCHIATRY PROGRESS NOTE - NSBHFUPINTERVALHXFT_PSY_A_CORE
No acute event overnight, patient continues to refuse all her medications.     Chart reviewed, patient seen and evaluated by her bedside. Patient appears disheveled and is laying on bed throughout the interview. Patient states she slept "ok", and reports some anxiety. Patient states she has been trying to get her usual drink of a gin and tonic, and has been trying to get "Sourav", (who she previously stated was her  who she now says is also her boyfriend) to make her one. She states "you can try to get a hold of him, I have too, but he sleeps a lot just like me". Patient states that she needs to "get her house in order" and that "Terrance the [employer] emmanuelle" and "Sourav" usually go downstairs together to help repair the circuits when needed. Patient further mentions that Sourav is a very rich man, that he buys properties for a living, and that she can't explain how they got together, and that "it just happened that way". She stated at the end of the interview that "I need to go  a red [employer] today with him, we are doing that at 4:30 PM".

## 2025-05-16 NOTE — BH INPATIENT PSYCHIATRY PROGRESS NOTE - NSBHATTESTCOMMENTATTENDFT_PSY_A_CORE
Interviewed the patient with the medical student Odalys. Patient remains psychotic and oddly related. Presentation remains the same as she has been refusing all her medications. Agree with the assessment and plan.

## 2025-05-16 NOTE — BH INPATIENT PSYCHIATRY PROGRESS NOTE - NSBHCHARTREVIEWVS_PSY_A_CORE FT
Vital Signs Last 24 Hrs  T(C): 37.1 (05-16-25 @ 08:44), Max: 37.1 (05-16-25 @ 08:44)  T(F): 98.7 (05-16-25 @ 08:44), Max: 98.7 (05-16-25 @ 08:44)  HR: 79 (05-16-25 @ 08:44) (76 - 79)  BP: 146/76 (05-16-25 @ 08:44) (101/55 - 146/76)  BP(mean): --  RR: --  SpO2: --
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
## 2025-05-16 NOTE — BH INPATIENT PSYCHIATRY PROGRESS NOTE - NSBHMETABOLIC_PSY_ALL_CORE_FT
BMI: BMI (kg/m2): 29.8 (05-08-25 @ 14:42)  HbA1c: A1C with Estimated Average Glucose Result: 5.8 % (04-25-25 @ 04:21)    Glucose: POCT Blood Glucose.: 150 mg/dL (05-03-25 @ 08:30)    BP: 146/76 (05-16-25 @ 08:44) (101/55 - 146/76)Vital Signs Last 24 Hrs  T(C): 37.1 (05-16-25 @ 08:44), Max: 37.1 (05-16-25 @ 08:44)  T(F): 98.7 (05-16-25 @ 08:44), Max: 98.7 (05-16-25 @ 08:44)  HR: 79 (05-16-25 @ 08:44) (76 - 79)  BP: 146/76 (05-16-25 @ 08:44) (101/55 - 146/76)  BP(mean): --  RR: --  SpO2: --      Lipid Panel:

## 2025-05-19 PROCEDURE — 99232 SBSQ HOSP IP/OBS MODERATE 35: CPT

## 2025-05-19 RX ORDER — HALOPERIDOL 10 MG/1
5 TABLET ORAL AT BEDTIME
Refills: 0 | Status: DISCONTINUED | OUTPATIENT
Start: 2025-05-19 | End: 2025-05-27

## 2025-05-19 RX ORDER — RISPERIDONE 4 MG
2 TABLET ORAL AT BEDTIME
Refills: 0 | Status: DISCONTINUED | OUTPATIENT
Start: 2025-05-19 | End: 2025-05-23

## 2025-05-19 RX ADMIN — HALOPERIDOL 5 MILLIGRAM(S): 10 TABLET ORAL at 20:19

## 2025-05-19 RX ADMIN — NICOTINE POLACRILEX 4 MILLIGRAM(S): 4 GUM, CHEWING ORAL at 08:39

## 2025-05-19 NOTE — BH INPATIENT PSYCHIATRY PROGRESS NOTE - NSBHCHARTREVIEWVS_PSY_A_CORE FT
Vital Signs Last 24 Hrs  T(C): 36.7 (05-19-25 @ 08:02), Max: 36.7 (05-19-25 @ 08:02)  T(F): 98.1 (05-19-25 @ 08:02), Max: 98.1 (05-19-25 @ 08:02)  HR: 80 (05-19-25 @ 08:02) (76 - 80)  BP: 135/80 (05-19-25 @ 08:02) (121/68 - 135/80)  BP(mean): --  RR: --  SpO2: --
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
## 2025-05-19 NOTE — BH INPATIENT PSYCHIATRY PROGRESS NOTE - NSBHMETABOLIC_PSY_ALL_CORE_FT
BMI: BMI (kg/m2): 29.8 (05-08-25 @ 14:42)  HbA1c: A1C with Estimated Average Glucose Result: 5.8 % (04-25-25 @ 04:21)    Glucose: POCT Blood Glucose.: 150 mg/dL (05-03-25 @ 08:30)    BP: 135/80 (05-19-25 @ 08:02) (106/65 - 156/81)Vital Signs Last 24 Hrs  T(C): 36.7 (05-19-25 @ 08:02), Max: 36.7 (05-19-25 @ 08:02)  T(F): 98.1 (05-19-25 @ 08:02), Max: 98.1 (05-19-25 @ 08:02)  HR: 80 (05-19-25 @ 08:02) (76 - 80)  BP: 135/80 (05-19-25 @ 08:02) (121/68 - 135/80)  BP(mean): --  RR: --  SpO2: --      Lipid Panel:

## 2025-05-19 NOTE — BH INPATIENT PSYCHIATRY PROGRESS NOTE - CURRENT MEDICATION
MEDICATIONS  (STANDING):  divalproex  milliGRAM(s) Oral at bedtime  haloperidol    Injectable 5 milliGRAM(s) IntraMuscular at bedtime  risperiDONE   Tablet 2 milliGRAM(s) Oral at bedtime    MEDICATIONS  (PRN):  haloperidol     Tablet 5 milliGRAM(s) Oral every 6 hours PRN agitation  hydrOXYzine hydrochloride 50 milliGRAM(s) Oral every 6 hours PRN Anxiety  nicotine  Polacrilex Gum 4 milliGRAM(s) Oral every 2 hours PRN Smoking Cessation
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
## 2025-05-20 PROCEDURE — 99232 SBSQ HOSP IP/OBS MODERATE 35: CPT | Mod: GC

## 2025-05-20 RX ADMIN — Medication 2 MILLIGRAM(S): at 20:26

## 2025-05-20 RX ADMIN — NICOTINE POLACRILEX 4 MILLIGRAM(S): 4 GUM, CHEWING ORAL at 05:59

## 2025-05-20 RX ADMIN — NICOTINE POLACRILEX 4 MILLIGRAM(S): 4 GUM, CHEWING ORAL at 18:42

## 2025-05-20 RX ADMIN — Medication 750 MILLIGRAM(S): at 20:26

## 2025-05-20 RX ADMIN — HYDROXYZINE HYDROCHLORIDE 50 MILLIGRAM(S): 25 TABLET, FILM COATED ORAL at 15:04

## 2025-05-20 RX ADMIN — NICOTINE POLACRILEX 4 MILLIGRAM(S): 4 GUM, CHEWING ORAL at 13:23

## 2025-05-20 NOTE — BH INPATIENT PSYCHIATRY PROGRESS NOTE - NSBHCHARTREVIEWVS_PSY_A_CORE FT
Vital Signs Last 24 Hrs  T(C): 36.6 (05-20-25 @ 10:05), Max: 37 (05-19-25 @ 16:20)  T(F): 97.8 (05-20-25 @ 10:05), Max: 98.6 (05-19-25 @ 16:20)  HR: 79 (05-20-25 @ 10:05) (73 - 79)  BP: 108/63 (05-20-25 @ 10:05) (108/63 - 124/69)  BP(mean): --  RR: --  SpO2: --

## 2025-05-20 NOTE — BH INPATIENT PSYCHIATRY PROGRESS NOTE - NSBHMETABOLIC_PSY_ALL_CORE_FT
BMI: BMI (kg/m2): 29.8 (05-08-25 @ 14:42)  HbA1c: A1C with Estimated Average Glucose Result: 5.8 % (04-25-25 @ 04:21)    Glucose: POCT Blood Glucose.: 150 mg/dL (05-03-25 @ 08:30)    BP: 108/63 (05-20-25 @ 10:05) (106/65 - 135/80)Vital Signs Last 24 Hrs  T(C): 36.6 (05-20-25 @ 10:05), Max: 37 (05-19-25 @ 16:20)  T(F): 97.8 (05-20-25 @ 10:05), Max: 98.6 (05-19-25 @ 16:20)  HR: 79 (05-20-25 @ 10:05) (73 - 79)  BP: 108/63 (05-20-25 @ 10:05) (108/63 - 124/69)  BP(mean): --  RR: --  SpO2: --      Lipid Panel:
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
## 2025-05-21 LAB
CHOLEST SERPL-MCNC: 140 MG/DL — SIGNIFICANT CHANGE UP
HAV IGM SER-ACNC: SIGNIFICANT CHANGE UP
HBV CORE IGM SER-ACNC: SIGNIFICANT CHANGE UP
HBV SURFACE AG SER-ACNC: SIGNIFICANT CHANGE UP
HCV AB S/CO SERPL IA: 0.14 S/CO — SIGNIFICANT CHANGE UP (ref 0–0.79)
HCV AB SERPL-IMP: SIGNIFICANT CHANGE UP
HDLC SERPL-MCNC: 55 MG/DL — SIGNIFICANT CHANGE UP
LDLC SERPL-MCNC: 64 MG/DL — SIGNIFICANT CHANGE UP
LIPID PNL WITH DIRECT LDL SERPL: 64 MG/DL — SIGNIFICANT CHANGE UP
MEV IGG SER-ACNC: >300 AU/ML — SIGNIFICANT CHANGE UP
MEV IGG+IGM SER-IMP: POSITIVE — SIGNIFICANT CHANGE UP
MUV AB SER-ACNC: POSITIVE — SIGNIFICANT CHANGE UP
MUV IGG FLD-ACNC: >300 AU/ML — SIGNIFICANT CHANGE UP
NONHDLC SERPL-MCNC: 85 MG/DL — SIGNIFICANT CHANGE UP
RUBV IGG SER-ACNC: 10.8 INDEX — SIGNIFICANT CHANGE UP
RUBV IGG SER-IMP: POSITIVE — SIGNIFICANT CHANGE UP
TRIGL SERPL-MCNC: 121 MG/DL — SIGNIFICANT CHANGE UP
TSH SERPL-MCNC: 2.02 UIU/ML — SIGNIFICANT CHANGE UP (ref 0.27–4.2)

## 2025-05-21 PROCEDURE — 99232 SBSQ HOSP IP/OBS MODERATE 35: CPT | Mod: GC

## 2025-05-21 RX ADMIN — Medication 2 MILLIGRAM(S): at 19:26

## 2025-05-21 RX ADMIN — HYDROXYZINE HYDROCHLORIDE 50 MILLIGRAM(S): 25 TABLET, FILM COATED ORAL at 23:04

## 2025-05-21 RX ADMIN — NICOTINE POLACRILEX 4 MILLIGRAM(S): 4 GUM, CHEWING ORAL at 13:39

## 2025-05-21 RX ADMIN — NICOTINE POLACRILEX 4 MILLIGRAM(S): 4 GUM, CHEWING ORAL at 08:44

## 2025-05-21 RX ADMIN — Medication 750 MILLIGRAM(S): at 19:26

## 2025-05-21 NOTE — BH INPATIENT PSYCHIATRY PROGRESS NOTE - NSBHATTESTCOMMENTATTENDFT_PSY_A_CORE
Interviewed the patient with the medical student Odalys. Patient presents oddly related, with no insight, disorganised and grossly inappropriate behavior. Agree with the assessment and plan.

## 2025-05-21 NOTE — BH INPATIENT PSYCHIATRY PROGRESS NOTE - NSBHCHARTREVIEWVS_PSY_A_CORE FT
Vital Signs Last 24 Hrs  T(C): 36.7 (05-21-25 @ 08:41), Max: 36.7 (05-21-25 @ 08:41)  T(F): 98 (05-21-25 @ 08:41), Max: 98 (05-21-25 @ 08:41)  HR: 97 (05-21-25 @ 08:41) (97 - 97)  BP: 136/69 (05-21-25 @ 08:41) (136/69 - 149/82)  BP(mean): --  RR: --  SpO2: --
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
## 2025-05-21 NOTE — BH INPATIENT PSYCHIATRY PROGRESS NOTE - OTHER
seemed more calm during interview, but became agitated after while walking in the hallway.  Somewhat loose  Appears dishevelled  Guarded  guarded and suspecting

## 2025-05-21 NOTE — BH INPATIENT PSYCHIATRY PROGRESS NOTE - NSBHMETABOLIC_PSY_ALL_CORE_FT
BMI: BMI (kg/m2): 29.8 (05-08-25 @ 14:42)  HbA1c: A1C with Estimated Average Glucose Result: 5.8 % (04-25-25 @ 04:21)    Glucose: POCT Blood Glucose.: 150 mg/dL (05-03-25 @ 08:30)    BP: 136/69 (05-21-25 @ 08:41) (108/63 - 149/82)Vital Signs Last 24 Hrs  T(C): 36.7 (05-21-25 @ 08:41), Max: 36.7 (05-21-25 @ 08:41)  T(F): 98 (05-21-25 @ 08:41), Max: 98 (05-21-25 @ 08:41)  HR: 97 (05-21-25 @ 08:41) (97 - 97)  BP: 136/69 (05-21-25 @ 08:41) (136/69 - 149/82)  BP(mean): --  RR: --  SpO2: --      Lipid Panel: Date/Time: 05-21-25 @ 08:00  Cholesterol, Serum: 140  LDL Cholesterol Calculated: 64  HDL Cholesterol, Serum: 55  Total Cholesterol/HDL Ration Measurement: --  Triglycerides, Serum: 121
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
## 2025-05-22 PROCEDURE — 99231 SBSQ HOSP IP/OBS SF/LOW 25: CPT

## 2025-05-22 RX ORDER — LORAZEPAM 4 MG/ML
1 VIAL (ML) INJECTION ONCE
Refills: 0 | Status: DISCONTINUED | OUTPATIENT
Start: 2025-05-22 | End: 2025-05-22

## 2025-05-22 RX ORDER — HALOPERIDOL 10 MG/1
5 TABLET ORAL ONCE
Refills: 0 | Status: COMPLETED | OUTPATIENT
Start: 2025-05-22 | End: 2025-05-22

## 2025-05-22 RX ORDER — DIPHENHYDRAMINE HCL 12.5MG/5ML
50 ELIXIR ORAL ONCE
Refills: 0 | Status: COMPLETED | OUTPATIENT
Start: 2025-05-22 | End: 2025-05-22

## 2025-05-22 RX ADMIN — Medication 50 MILLIGRAM(S): at 17:32

## 2025-05-22 RX ADMIN — Medication 2 MILLIGRAM(S): at 20:45

## 2025-05-22 RX ADMIN — Medication 750 MILLIGRAM(S): at 20:45

## 2025-05-22 RX ADMIN — Medication 1 MILLIGRAM(S): at 17:32

## 2025-05-22 RX ADMIN — HALOPERIDOL 5 MILLIGRAM(S): 10 TABLET ORAL at 17:32

## 2025-05-22 NOTE — BH INPATIENT PSYCHIATRY PROGRESS NOTE - NSBHATTESTCOMMENTATTENDFT_PSY_A_CORE
I directly observed the history and MSE performed by the medical student. I reviewed the note and edited where appropriate. I agree with the assessment and plan as written. Patient is disorganized, paranoid, and irritable. She is difficult to redirect verbally. She perseverates on receiving a medication in the past that starts with the letter 'c' and that she will only take that medication. No medication changes today.
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
## 2025-05-22 NOTE — BH INPATIENT PSYCHIATRY PROGRESS NOTE - NSBHMETABOLIC_PSY_ALL_CORE_FT
BMI: BMI (kg/m2): 29.8 (05-08-25 @ 14:42)  HbA1c: A1C with Estimated Average Glucose Result: 5.8 % (04-25-25 @ 04:21)    Glucose: POCT Blood Glucose.: 150 mg/dL (05-03-25 @ 08:30)    BP: 133/87 (05-22-25 @ 09:07) (108/63 - 149/82)Vital Signs Last 24 Hrs  T(C): 36.9 (05-22-25 @ 09:07), Max: 37 (05-21-25 @ 16:25)  T(F): 98.4 (05-22-25 @ 09:07), Max: 98.6 (05-21-25 @ 16:25)  HR: 111 (05-22-25 @ 09:07) (102 - 111)  BP: 133/87 (05-22-25 @ 09:07) (113/77 - 133/87)  BP(mean): --  RR: --  SpO2: --      Lipid Panel: Date/Time: 05-21-25 @ 08:00  Cholesterol, Serum: 140  LDL Cholesterol Calculated: 64  HDL Cholesterol, Serum: 55  Total Cholesterol/HDL Ration Measurement: --  Triglycerides, Serum: 121

## 2025-05-22 NOTE — BH INPATIENT PSYCHIATRY PROGRESS NOTE - CURRENT MEDICATION
MEDICATIONS  (STANDING):  divalproex  milliGRAM(s) Oral at bedtime  haloperidol    Injectable 5 milliGRAM(s) IntraMuscular at bedtime  risperiDONE   Tablet 2 milliGRAM(s) Oral at bedtime    MEDICATIONS  (PRN):  haloperidol     Tablet 5 milliGRAM(s) Oral every 6 hours PRN agitation  hydrOXYzine hydrochloride 50 milliGRAM(s) Oral every 6 hours PRN Anxiety  nicotine  Polacrilex Gum 4 milliGRAM(s) Oral every 2 hours PRN Smoking Cessation   MEDICATIONS  (STANDING):  cetirizine 10 milliGRAM(s) Oral at bedtime  divalproex  milliGRAM(s) Oral at bedtime  haloperidol    Injectable 5 milliGRAM(s) IntraMuscular at bedtime  risperiDONE   Tablet 2 milliGRAM(s) Oral at bedtime    MEDICATIONS  (PRN):  haloperidol     Tablet 5 milliGRAM(s) Oral every 6 hours PRN agitation  hydrOXYzine hydrochloride 50 milliGRAM(s) Oral every 6 hours PRN Anxiety  nicotine  Polacrilex Gum 4 milliGRAM(s) Oral every 2 hours PRN Smoking Cessation

## 2025-05-22 NOTE — BH INPATIENT PSYCHIATRY PROGRESS NOTE - NSBHCHARTREVIEWVS_PSY_A_CORE FT
Vital Signs Last 24 Hrs  T(C): 36.9 (05-22-25 @ 09:07), Max: 37 (05-21-25 @ 16:25)  T(F): 98.4 (05-22-25 @ 09:07), Max: 98.6 (05-21-25 @ 16:25)  HR: 111 (05-22-25 @ 09:07) (102 - 111)  BP: 133/87 (05-22-25 @ 09:07) (113/77 - 133/87)  BP(mean): --  RR: --  SpO2: --

## 2025-05-22 NOTE — BH INPATIENT PSYCHIATRY PROGRESS NOTE - OTHER
Appears dishevelled  Guarded  Somewhat loose  patient becomes agitated when discussing her medications guarded and suspecting
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
## 2025-05-22 NOTE — BH CHART NOTE - NSEVENTNOTEFT_PSY_ALL_CORE
Received call that patient was agitated, yelling, posturing and kicking objects. On assessment, she is irritable and difficult to redirect. Ordered IM haloperidol, lorazepam, and diphenhydramine for acute agitation.

## 2025-05-22 NOTE — BH CHART NOTE - NSEVENTNOTEFT_PSY_ALL_CORE
Per nursing staff, patient was seen agitated and yelling at the nurses station requesting her medication that "begins with a c" Patient was spoken to by team and provided a list of her standing medications for her reference.  Per nursing staff, patient was seen agitated and yelling at the nurses station requesting her medication that "begins with a c" Patient was spoken to by team and provided a list of her standing medications for her reference. She was verbally redirected to her room and did not require PRN medication for agitation.

## 2025-05-23 LAB
GAMMA INTERFERON BACKGROUND BLD IA-ACNC: 0.04 IU/ML — SIGNIFICANT CHANGE UP
M TB IFN-G BLD-IMP: NEGATIVE — SIGNIFICANT CHANGE UP
M TB IFN-G CD4+ BCKGRND COR BLD-ACNC: 0 IU/ML — SIGNIFICANT CHANGE UP
M TB IFN-G CD4+CD8+ BCKGRND COR BLD-ACNC: 0 IU/ML — SIGNIFICANT CHANGE UP
QUANT TB PLUS MITOGEN MINUS NIL: 8.43 IU/ML — SIGNIFICANT CHANGE UP

## 2025-05-23 PROCEDURE — 99232 SBSQ HOSP IP/OBS MODERATE 35: CPT | Mod: GC

## 2025-05-23 RX ORDER — RISPERIDONE 4 MG
3 TABLET ORAL AT BEDTIME
Refills: 0 | Status: DISCONTINUED | OUTPATIENT
Start: 2025-05-23 | End: 2025-05-28

## 2025-05-23 RX ADMIN — NICOTINE POLACRILEX 4 MILLIGRAM(S): 4 GUM, CHEWING ORAL at 11:17

## 2025-05-23 RX ADMIN — Medication 3 MILLIGRAM(S): at 20:37

## 2025-05-23 RX ADMIN — Medication 750 MILLIGRAM(S): at 20:38

## 2025-05-23 RX ADMIN — HYDROXYZINE HYDROCHLORIDE 50 MILLIGRAM(S): 25 TABLET, FILM COATED ORAL at 10:36

## 2025-05-23 RX ADMIN — NICOTINE POLACRILEX 4 MILLIGRAM(S): 4 GUM, CHEWING ORAL at 08:17

## 2025-05-23 RX ADMIN — Medication 10 MILLIGRAM(S): at 20:38
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
## 2025-05-23 NOTE — BH INPATIENT PSYCHIATRY PROGRESS NOTE - CURRENT MEDICATION
MEDICATIONS  (STANDING):  cetirizine 10 milliGRAM(s) Oral at bedtime  divalproex  milliGRAM(s) Oral at bedtime  haloperidol    Injectable 5 milliGRAM(s) IntraMuscular at bedtime  risperiDONE   Tablet 3 milliGRAM(s) Oral at bedtime    MEDICATIONS  (PRN):  haloperidol     Tablet 5 milliGRAM(s) Oral every 6 hours PRN agitation  hydrOXYzine hydrochloride 50 milliGRAM(s) Oral every 6 hours PRN Anxiety  nicotine  Polacrilex Gum 4 milliGRAM(s) Oral every 2 hours PRN Smoking Cessation
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
## 2025-05-23 NOTE — BH INPATIENT PSYCHIATRY PROGRESS NOTE - OTHER
guarded and suspecting "great" Somewhat loose  Guarded  Appears dishevelled  Somewhat tangential denies AH but at times appears to be responding to internal stimuli

## 2025-05-23 NOTE — BH INPATIENT PSYCHIATRY PROGRESS NOTE - NSBHATTESTCOMMENTATTENDFT_PSY_A_CORE
Interviewed the patient with the medical student Odalys. Patient presents oddly related, at times with intense stare, inappropriate laughter. She continues to report delusions of being "watched", others "playing games", etc. She has been reluctantly taking court ordered medications over the last several days. Agree withteh assessment and plan. Will increase risperidone to 3mg HS.

## 2025-05-23 NOTE — BH INPATIENT PSYCHIATRY PROGRESS NOTE - NSBHMETABOLIC_PSY_ALL_CORE_FT
BMI: BMI (kg/m2): 29.8 (05-08-25 @ 14:42)  HbA1c: A1C with Estimated Average Glucose Result: 5.8 % (04-25-25 @ 04:21)    Glucose: POCT Blood Glucose.: 150 mg/dL (05-03-25 @ 08:30)    BP: 136/85 (05-23-25 @ 08:27) (112/71 - 149/82)Vital Signs Last 24 Hrs  T(C): 36.7 (05-23-25 @ 08:27), Max: 36.8 (05-22-25 @ 15:55)  T(F): 98 (05-23-25 @ 08:27), Max: 98.2 (05-22-25 @ 15:55)  HR: 128 (05-23-25 @ 08:27) (94 - 128)  BP: 136/85 (05-23-25 @ 08:27) (112/71 - 136/85)  BP(mean): --  RR: --  SpO2: --      Lipid Panel: Date/Time: 05-21-25 @ 08:00  Cholesterol, Serum: 140  LDL Cholesterol Calculated: 64  HDL Cholesterol, Serum: 55  Total Cholesterol/HDL Ration Measurement: --  Triglycerides, Serum: 121

## 2025-05-23 NOTE — BH INPATIENT PSYCHIATRY PROGRESS NOTE - NSBHFUPINTERVALHXFT_PSY_A_CORE
As per nursing report, patient was agitated in the evening, and had reports of kicking and screaming- patient was given IM haldol, ativan, and benadryl. Per nursing, patient was compliant after to taking PO meds risperidone and depakote.     Patient was sitting in break room when approached by team, and when asked to speak in her room, patient states "Are you going to make me get up now? I am going to fall and hit my head". Patient is escorted by staff and team to room, and started laughing while walking. In the room, when asked why patient is laughing, she states "Well I got my atarax... that is the medication, and it helps me feel sleepy so I am going to sleep now after this, but I don't want to. The medical personnel can watch me from my window". Patient further states that she believes medical personnel are watching her "everywhere" especially through her windows and that she sleeps in a sideways position so "they can't see her". Patient also mentions that eagles and vultures are watching her too, and that "they only come out at night". She states that she has been sleeping for 10 hours every night for the last 5 weeks that she has been here, but when told that she has only been here for 2 weeks, she says "I know but I have to go home and pay my rent and cook and clean! Everyone in my building are playing games about who they are dating and what they are doing... I don't pay attention to any of that I am just trying to live my life. I see certain things and store them in my brain". She states that the risperidone and depakote don't "help her sleep" and make her feel more anxious in the morning. She denies any SI and endorses good appetite.    When asked if she can hear voices, patient states "Well no, but I hate when people think they can get their way. These Hispanics think they are getting free credit cards and are withdrawing cash. That is thievery! That money belongs to someone else! The mailmen drops off the cards at their houses and they use these cards at stores without activating them, I have seen it. I am not racist." Patient also states she got a bill for a credit card that she is not using.

## 2025-05-23 NOTE — BH INPATIENT PSYCHIATRY PROGRESS NOTE - NSBHCHARTREVIEWVS_PSY_A_CORE FT
Vital Signs Last 24 Hrs  T(C): 36.7 (05-23-25 @ 08:27), Max: 36.8 (05-22-25 @ 15:55)  T(F): 98 (05-23-25 @ 08:27), Max: 98.2 (05-22-25 @ 15:55)  HR: 128 (05-23-25 @ 08:27) (94 - 128)  BP: 136/85 (05-23-25 @ 08:27) (112/71 - 136/85)  BP(mean): --  RR: --  SpO2: --

## 2025-05-24 PROCEDURE — 99232 SBSQ HOSP IP/OBS MODERATE 35: CPT

## 2025-05-24 RX ADMIN — NICOTINE POLACRILEX 4 MILLIGRAM(S): 4 GUM, CHEWING ORAL at 18:33

## 2025-05-24 RX ADMIN — HYDROXYZINE HYDROCHLORIDE 50 MILLIGRAM(S): 25 TABLET, FILM COATED ORAL at 19:27

## 2025-05-24 RX ADMIN — Medication 3 MILLIGRAM(S): at 20:28

## 2025-05-24 RX ADMIN — Medication 10 MILLIGRAM(S): at 20:28

## 2025-05-24 RX ADMIN — NICOTINE POLACRILEX 4 MILLIGRAM(S): 4 GUM, CHEWING ORAL at 07:28

## 2025-05-24 RX ADMIN — HYDROXYZINE HYDROCHLORIDE 50 MILLIGRAM(S): 25 TABLET, FILM COATED ORAL at 13:04

## 2025-05-24 RX ADMIN — NICOTINE POLACRILEX 4 MILLIGRAM(S): 4 GUM, CHEWING ORAL at 12:26

## 2025-05-24 RX ADMIN — HYDROXYZINE HYDROCHLORIDE 50 MILLIGRAM(S): 25 TABLET, FILM COATED ORAL at 06:35

## 2025-05-24 NOTE — BH INPATIENT PSYCHIATRY PROGRESS NOTE - OTHER
denies AH but at times appears to be responding to internal stimuli "great" guarded and suspecting Somewhat tangential Appears dishevelled  Guarded
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
## 2025-05-24 NOTE — BH INPATIENT PSYCHIATRY PROGRESS NOTE - NSBHCHARTREVIEWVS_PSY_A_CORE FT
Vital Signs Last 24 Hrs  T(C): 36.7 (05-24-25 @ 16:02), Max: 36.7 (05-23-25 @ 17:47)  T(F): 98.1 (05-24-25 @ 16:02), Max: 98.1 (05-23-25 @ 17:47)  HR: 114 (05-24-25 @ 16:02) (97 - 114)  BP: 147/75 (05-24-25 @ 16:02) (105/67 - 147/75)  BP(mean): --  RR: --  SpO2: --

## 2025-05-24 NOTE — BH INPATIENT PSYCHIATRY PROGRESS NOTE - NSBHMETABOLIC_PSY_ALL_CORE_FT
BMI: BMI (kg/m2): 29.8 (05-08-25 @ 14:42)  HbA1c: A1C with Estimated Average Glucose Result: 5.8 % (04-25-25 @ 04:21)    Glucose: POCT Blood Glucose.: 150 mg/dL (05-03-25 @ 08:30)    BP: 147/75 (05-24-25 @ 16:02) (105/67 - 147/75)Vital Signs Last 24 Hrs  T(C): 36.7 (05-24-25 @ 16:02), Max: 36.7 (05-23-25 @ 17:47)  T(F): 98.1 (05-24-25 @ 16:02), Max: 98.1 (05-23-25 @ 17:47)  HR: 114 (05-24-25 @ 16:02) (97 - 114)  BP: 147/75 (05-24-25 @ 16:02) (105/67 - 147/75)  BP(mean): --  RR: --  SpO2: --      Lipid Panel: Date/Time: 05-21-25 @ 08:00  Cholesterol, Serum: 140  LDL Cholesterol Calculated: 64  HDL Cholesterol, Serum: 55  Total Cholesterol/HDL Ration Measurement: --  Triglycerides, Serum: 121

## 2025-05-24 NOTE — BH INPATIENT PSYCHIATRY PROGRESS NOTE - NSBHFUPINTERVALHXFT_PSY_A_CORE
Attempts to interview patient was met with hostile statements and at one point she slammed her book on encounter.  While she was not directly threatening she was clearly loud and hostile essentially reporting that we were not helping her in any way and that she did not want to talk to us.   We attempted to point out to her that the medicine that we are off from her and she is refusing, it is very likely to alleviate some of the anxiety she is currently experiencing.   She repeats on multiple occasions "you are not helping me".  Team attempts to communicate the fact that we are trying to help her even if she does not  believe that is true.  We truly think that the medication will alleviate some of her unhappiness so even if she things were wrong she can be reassured that we are "trying to help".   she seems entirely unmoved by that  statement of intent.

## 2025-05-25 RX ORDER — LORAZEPAM 4 MG/ML
1 VIAL (ML) INJECTION ONCE
Refills: 0 | Status: DISCONTINUED | OUTPATIENT
Start: 2025-05-25 | End: 2025-05-25

## 2025-05-25 RX ADMIN — HYDROXYZINE HYDROCHLORIDE 50 MILLIGRAM(S): 25 TABLET, FILM COATED ORAL at 07:57

## 2025-05-25 RX ADMIN — HYDROXYZINE HYDROCHLORIDE 50 MILLIGRAM(S): 25 TABLET, FILM COATED ORAL at 15:54

## 2025-05-25 RX ADMIN — Medication 10 MILLIGRAM(S): at 20:03

## 2025-05-25 RX ADMIN — Medication 1 MILLIGRAM(S): at 21:08

## 2025-05-25 RX ADMIN — Medication 3 MILLIGRAM(S): at 20:03

## 2025-05-25 RX ADMIN — NICOTINE POLACRILEX 4 MILLIGRAM(S): 4 GUM, CHEWING ORAL at 11:05

## 2025-05-25 NOTE — BH INPATIENT PSYCHIATRY PROGRESS NOTE - OTHER
guarded and suspecting denies AH but at times appears to be responding to internal stimuli Guarded  Somewhat tangential "great" Appears dishevelled

## 2025-05-25 NOTE — BH INPATIENT PSYCHIATRY PROGRESS NOTE - NSBHMETABOLIC_PSY_ALL_CORE_FT
BMI: BMI (kg/m2): 29.8 (05-08-25 @ 14:42)  HbA1c: A1C with Estimated Average Glucose Result: 5.8 % (04-25-25 @ 04:21)    Glucose: POCT Blood Glucose.: 150 mg/dL (05-03-25 @ 08:30)    BP: 135/85 (05-25-25 @ 08:43) (105/67 - 147/75)Vital Signs Last 24 Hrs  T(C): 36.6 (05-25-25 @ 08:43), Max: 36.7 (05-24-25 @ 16:02)  T(F): 97.8 (05-25-25 @ 08:43), Max: 98.1 (05-24-25 @ 16:02)  HR: 106 (05-25-25 @ 08:43) (106 - 114)  BP: 135/85 (05-25-25 @ 08:43) (135/85 - 147/75)  BP(mean): --  RR: --  SpO2: --      Lipid Panel: Date/Time: 05-21-25 @ 08:00  Cholesterol, Serum: 140  LDL Cholesterol Calculated: 64  HDL Cholesterol, Serum: 55  Total Cholesterol/HDL Ration Measurement: --  Triglycerides, Serum: 121
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
## 2025-05-25 NOTE — BH INPATIENT PSYCHIATRY PROGRESS NOTE - NSBHFUPINTERVALHXFT_PSY_A_CORE
Chart reviewed and patient seen.  Patient found in milieu and preferred not to go back to room for interview. Patient was quite pleasant and cooperative on today's interview. Says "my mood is good" and emphatically denies SI/HI/AVH. States there are no issues and declines when psychiatry team asks if they can do anything new to help patient today.

## 2025-05-25 NOTE — BH INPATIENT PSYCHIATRY PROGRESS NOTE - NSBHCHARTREVIEWVS_PSY_A_CORE FT
Vital Signs Last 24 Hrs  T(C): 36.6 (05-25-25 @ 08:43), Max: 36.7 (05-24-25 @ 16:02)  T(F): 97.8 (05-25-25 @ 08:43), Max: 98.1 (05-24-25 @ 16:02)  HR: 106 (05-25-25 @ 08:43) (106 - 114)  BP: 135/85 (05-25-25 @ 08:43) (135/85 - 147/75)  BP(mean): --  RR: --  SpO2: --

## 2025-05-26 LAB — VALPROATE SERPL-MCNC: 6 UG/ML — LOW (ref 50–100)

## 2025-05-26 PROCEDURE — 99232 SBSQ HOSP IP/OBS MODERATE 35: CPT

## 2025-05-26 RX ORDER — LORAZEPAM 4 MG/ML
1 VIAL (ML) INJECTION ONCE
Refills: 0 | Status: DISCONTINUED | OUTPATIENT
Start: 2025-05-26 | End: 2025-05-26

## 2025-05-26 RX ADMIN — HALOPERIDOL 5 MILLIGRAM(S): 10 TABLET ORAL at 21:30

## 2025-05-26 RX ADMIN — HYDROXYZINE HYDROCHLORIDE 50 MILLIGRAM(S): 25 TABLET, FILM COATED ORAL at 09:34

## 2025-05-26 RX ADMIN — NICOTINE POLACRILEX 4 MILLIGRAM(S): 4 GUM, CHEWING ORAL at 16:20

## 2025-05-26 NOTE — BH INPATIENT PSYCHIATRY PROGRESS NOTE - NSBHMETABOLIC_PSY_ALL_CORE_FT
BMI: BMI (kg/m2): 29.8 (05-08-25 @ 14:42)  HbA1c: A1C with Estimated Average Glucose Result: 5.8 % (04-25-25 @ 04:21)    Glucose: POCT Blood Glucose.: 150 mg/dL (05-03-25 @ 08:30)    BP: 159/71 (05-25-25 @ 16:39) (105/67 - 159/71)Vital Signs Last 24 Hrs  T(C): 37.3 (05-25-25 @ 16:39), Max: 37.3 (05-25-25 @ 16:39)  T(F): 99.1 (05-25-25 @ 16:39), Max: 99.1 (05-25-25 @ 16:39)  HR: 101 (05-25-25 @ 16:39) (101 - 101)  BP: 159/71 (05-25-25 @ 16:39) (159/71 - 159/71)  BP(mean): --  RR: --  SpO2: --      Lipid Panel: Date/Time: 05-21-25 @ 08:00  Cholesterol, Serum: 140  LDL Cholesterol Calculated: 64  HDL Cholesterol, Serum: 55  Total Cholesterol/HDL Ration Measurement: --  Triglycerides, Serum: 121   BMI: BMI (kg/m2): 29.8 (05-08-25 @ 14:42)  HbA1c: A1C with Estimated Average Glucose Result: 5.8 % (04-25-25 @ 04:21)    Glucose: POCT Blood Glucose.: 150 mg/dL (05-03-25 @ 08:30)    BP: 112/68 (05-26-25 @ 16:06) (105/67 - 159/71)Vital Signs Last 24 Hrs  T(C): 36.7 (05-26-25 @ 16:06), Max: 36.7 (05-26-25 @ 16:06)  T(F): 98 (05-26-25 @ 16:06), Max: 98 (05-26-25 @ 16:06)  HR: 98 (05-26-25 @ 16:06) (98 - 98)  BP: 112/68 (05-26-25 @ 16:06) (112/68 - 112/68)  BP(mean): --  RR: --  SpO2: --      Lipid Panel: Date/Time: 05-21-25 @ 08:00  Cholesterol, Serum: 140  LDL Cholesterol Calculated: 64  HDL Cholesterol, Serum: 55  Total Cholesterol/HDL Ration Measurement: --  Triglycerides, Serum: 121

## 2025-05-26 NOTE — BH INPATIENT PSYCHIATRY PROGRESS NOTE - NSBHCHARTREVIEWVS_PSY_A_CORE FT
Vital Signs Last 24 Hrs  T(C): 37.3 (05-25-25 @ 16:39), Max: 37.3 (05-25-25 @ 16:39)  T(F): 99.1 (05-25-25 @ 16:39), Max: 99.1 (05-25-25 @ 16:39)  HR: 101 (05-25-25 @ 16:39) (101 - 101)  BP: 159/71 (05-25-25 @ 16:39) (159/71 - 159/71)  BP(mean): --  RR: --  SpO2: --     Vital Signs Last 24 Hrs  T(C): 36.7 (05-26-25 @ 16:06), Max: 36.7 (05-26-25 @ 16:06)  T(F): 98 (05-26-25 @ 16:06), Max: 98 (05-26-25 @ 16:06)  HR: 98 (05-26-25 @ 16:06) (98 - 98)  BP: 112/68 (05-26-25 @ 16:06) (112/68 - 112/68)  BP(mean): --  RR: --  SpO2: --
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
## 2025-05-26 NOTE — BH INPATIENT PSYCHIATRY PROGRESS NOTE - OTHER
denies AH but at times appears to be responding to internal stimuli Somewhat tangential guarded and suspecting "great" Appears dishevelled  Guarded

## 2025-05-26 NOTE — BH INPATIENT PSYCHIATRY PROGRESS NOTE - NSBHATTESTCOMMENTATTENDFT_PSY_A_CORE
Patient was seen and examined by me. I reviewed and agree with the findings and plan as documented in the Resident's note, unless noted below.

## 2025-05-26 NOTE — PROGRESS NOTE ADULT - SUBJECTIVE AND OBJECTIVE BOX
I got a call from the resident on call requesting a manual hold order for this pt. She is agitated and disorganized. On AOT. Refuses PO meds. IM meds need to be given to prevent harm to self and others. Pt will need manual hold for this reason.

## 2025-05-26 NOTE — BH INPATIENT PSYCHIATRY PROGRESS NOTE - NSBHFUPINTERVALHXFT_PSY_A_CORE
Chart reviewed and patient seen.  Patient found in milieu and preferred not to go back to room for interview. Patient remained quite pleasant and cooperative on today's interview. Says "my mood is good" and emphatically denies SI/HI/AVH. States there are no issues and declines when psychiatry team asks if they can do anything new to help patient today. Does exhibit delusions by saying "they put a micro-chip in my arm" and "they play games here on the unit to keep you here longer."

## 2025-05-27 PROCEDURE — 99232 SBSQ HOSP IP/OBS MODERATE 35: CPT | Mod: GC

## 2025-05-27 RX ORDER — NICOTINE POLACRILEX 4 MG/1
1 GUM, CHEWING ORAL DAILY
Refills: 0 | Status: DISCONTINUED | OUTPATIENT
Start: 2025-05-27 | End: 2025-06-24

## 2025-05-27 RX ORDER — HALOPERIDOL 10 MG/1
10 TABLET ORAL AT BEDTIME
Refills: 0 | Status: DISCONTINUED | OUTPATIENT
Start: 2025-05-27 | End: 2025-05-28

## 2025-05-27 RX ADMIN — Medication 10 MILLIGRAM(S): at 20:34

## 2025-05-27 RX ADMIN — HYDROXYZINE HYDROCHLORIDE 50 MILLIGRAM(S): 25 TABLET, FILM COATED ORAL at 21:30

## 2025-05-27 RX ADMIN — HALOPERIDOL 5 MILLIGRAM(S): 10 TABLET ORAL at 20:35

## 2025-05-27 RX ADMIN — Medication 3 MILLIGRAM(S): at 20:35

## 2025-05-27 RX ADMIN — Medication 750 MILLIGRAM(S): at 20:34

## 2025-05-27 RX ADMIN — HYDROXYZINE HYDROCHLORIDE 50 MILLIGRAM(S): 25 TABLET, FILM COATED ORAL at 05:57

## 2025-05-27 RX ADMIN — HYDROXYZINE HYDROCHLORIDE 50 MILLIGRAM(S): 25 TABLET, FILM COATED ORAL at 15:24

## 2025-05-27 NOTE — BH INPATIENT PSYCHIATRY PROGRESS NOTE - NSBHMETABOLIC_PSY_ALL_CORE_FT
BMI: BMI (kg/m2): 29.8 (05-08-25 @ 14:42)  HbA1c: A1C with Estimated Average Glucose Result: 5.8 % (04-25-25 @ 04:21)    Glucose: POCT Blood Glucose.: 150 mg/dL (05-03-25 @ 08:30)    BP: 112/68 (05-26-25 @ 16:06) (112/68 - 159/71)Vital Signs Last 24 Hrs  T(C): 36.7 (05-26-25 @ 16:06), Max: 36.7 (05-26-25 @ 16:06)  T(F): 98 (05-26-25 @ 16:06), Max: 98 (05-26-25 @ 16:06)  HR: 98 (05-26-25 @ 16:06) (98 - 98)  BP: 112/68 (05-26-25 @ 16:06) (112/68 - 112/68)  BP(mean): --  RR: --  SpO2: --      Lipid Panel: Date/Time: 05-21-25 @ 08:00  Cholesterol, Serum: 140  LDL Cholesterol Calculated: 64  HDL Cholesterol, Serum: 55  Total Cholesterol/HDL Ration Measurement: --  Triglycerides, Serum: 121   BMI: BMI (kg/m2): 29.8 (05-08-25 @ 14:42)  HbA1c: A1C with Estimated Average Glucose Result: 5.8 % (04-25-25 @ 04:21)    Glucose: POCT Blood Glucose.: 150 mg/dL (05-03-25 @ 08:30)    BP: 133/80 (05-27-25 @ 13:53) (112/68 - 159/71)Vital Signs Last 24 Hrs  T(C): 37.2 (05-27-25 @ 13:53), Max: 37.2 (05-27-25 @ 13:53)  T(F): 99 (05-27-25 @ 13:53), Max: 99 (05-27-25 @ 13:53)  HR: 108 (05-27-25 @ 13:53) (98 - 108)  BP: 133/80 (05-27-25 @ 13:53) (112/68 - 133/80)  BP(mean): --  RR: --  SpO2: --      Lipid Panel: Date/Time: 05-21-25 @ 08:00  Cholesterol, Serum: 140  LDL Cholesterol Calculated: 64  HDL Cholesterol, Serum: 55  Total Cholesterol/HDL Ration Measurement: --  Triglycerides, Serum: 121
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
## 2025-05-27 NOTE — BH INPATIENT PSYCHIATRY PROGRESS NOTE - OTHER
Guarded  Appears dishevelled  Somewhat tangential "good" guarded and suspecting denies AH but at times appears to be responding to internal stimuli.

## 2025-05-27 NOTE — BH INPATIENT PSYCHIATRY PROGRESS NOTE - NSBHCHARTREVIEWVS_PSY_A_CORE FT
Vital Signs Last 24 Hrs  T(C): 36.7 (05-26-25 @ 16:06), Max: 36.7 (05-26-25 @ 16:06)  T(F): 98 (05-26-25 @ 16:06), Max: 98 (05-26-25 @ 16:06)  HR: 98 (05-26-25 @ 16:06) (98 - 98)  BP: 112/68 (05-26-25 @ 16:06) (112/68 - 112/68)  BP(mean): --  RR: --  SpO2: --     Vital Signs Last 24 Hrs  T(C): 37.2 (05-27-25 @ 13:53), Max: 37.2 (05-27-25 @ 13:53)  T(F): 99 (05-27-25 @ 13:53), Max: 99 (05-27-25 @ 13:53)  HR: 108 (05-27-25 @ 13:53) (98 - 108)  BP: 133/80 (05-27-25 @ 13:53) (112/68 - 133/80)  BP(mean): --  RR: --  SpO2: --

## 2025-05-27 NOTE — BH INPATIENT PSYCHIATRY PROGRESS NOTE - NSBHATTESTCOMMENTATTENDFT_PSY_A_CORE
Interviewed the patient with the medical student Odalys. Patient remains oddly related, with episodes of inappropriate psychotic behavior, no insight and poor judgement. She continues to refuse medications despite TOO order and has been receiving Haldol IM as an alternative to PO risperidone. Will increase Haldol to 10mg IM if risperidone is refused. Agree with the assessment and plan.

## 2025-05-27 NOTE — BH INPATIENT PSYCHIATRY PROGRESS NOTE - CURRENT MEDICATION
MEDICATIONS  (STANDING):  cetirizine 10 milliGRAM(s) Oral at bedtime  divalproex  milliGRAM(s) Oral at bedtime  haloperidol    Injectable 5 milliGRAM(s) IntraMuscular at bedtime  risperiDONE   Tablet 3 milliGRAM(s) Oral at bedtime    MEDICATIONS  (PRN):  haloperidol     Tablet 5 milliGRAM(s) Oral every 6 hours PRN agitation  hydrOXYzine hydrochloride 50 milliGRAM(s) Oral every 6 hours PRN Anxiety  nicotine  Polacrilex Gum 4 milliGRAM(s) Oral every 2 hours PRN Smoking Cessation   MEDICATIONS  (STANDING):  cetirizine 10 milliGRAM(s) Oral at bedtime  divalproex  milliGRAM(s) Oral at bedtime  haloperidol    Injectable 10 milliGRAM(s) IntraMuscular at bedtime  risperiDONE   Tablet 3 milliGRAM(s) Oral at bedtime    MEDICATIONS  (PRN):  haloperidol     Tablet 5 milliGRAM(s) Oral every 6 hours PRN agitation  hydrOXYzine hydrochloride 50 milliGRAM(s) Oral every 6 hours PRN Anxiety  nicotine  Polacrilex Gum 4 milliGRAM(s) Oral every 2 hours PRN Smoking Cessation
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
## 2025-05-28 PROCEDURE — 99232 SBSQ HOSP IP/OBS MODERATE 35: CPT | Mod: GC

## 2025-05-28 RX ORDER — HALOPERIDOL 10 MG/1
7.5 TABLET ORAL EVERY 12 HOURS
Refills: 0 | Status: DISCONTINUED | OUTPATIENT
Start: 2025-05-28 | End: 2025-06-10

## 2025-05-28 RX ORDER — ACETAMINOPHEN 500 MG/5ML
650 LIQUID (ML) ORAL EVERY 6 HOURS
Refills: 0 | Status: DISCONTINUED | OUTPATIENT
Start: 2025-05-28 | End: 2025-06-24

## 2025-05-28 RX ORDER — RISPERIDONE 4 MG
2 TABLET ORAL EVERY 12 HOURS
Refills: 0 | Status: DISCONTINUED | OUTPATIENT
Start: 2025-05-28 | End: 2025-05-30

## 2025-05-28 RX ADMIN — Medication 650 MILLIGRAM(S): at 13:15

## 2025-05-28 RX ADMIN — HYDROXYZINE HYDROCHLORIDE 50 MILLIGRAM(S): 25 TABLET, FILM COATED ORAL at 07:41

## 2025-05-28 RX ADMIN — HALOPERIDOL 5 MILLIGRAM(S): 10 TABLET ORAL at 02:42

## 2025-05-28 RX ADMIN — Medication 650 MILLIGRAM(S): at 12:45

## 2025-05-28 RX ADMIN — Medication 2 MILLIGRAM(S): at 20:08

## 2025-05-28 RX ADMIN — Medication 10 MILLIGRAM(S): at 12:45

## 2025-05-28 RX ADMIN — Medication 750 MILLIGRAM(S): at 20:08

## 2025-05-28 RX ADMIN — HYDROXYZINE HYDROCHLORIDE 50 MILLIGRAM(S): 25 TABLET, FILM COATED ORAL at 21:05

## 2025-05-28 RX ADMIN — HALOPERIDOL 5 MILLIGRAM(S): 10 TABLET ORAL at 11:19

## 2025-05-28 NOTE — BH INPATIENT PSYCHIATRY PROGRESS NOTE - OTHER
guarded and suspecting Guarded  denies AH but at times appears to be responding to internal stimuli. Somewhat tangential "good" Appears dishevelled
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
## 2025-05-28 NOTE — BH INPATIENT PSYCHIATRY PROGRESS NOTE - NSBHMETABOLIC_PSY_ALL_CORE_FT
BMI: BMI (kg/m2): 29.8 (05-08-25 @ 14:42)  HbA1c: A1C with Estimated Average Glucose Result: 5.8 % (04-25-25 @ 04:21)    Glucose: POCT Blood Glucose.: 150 mg/dL (05-03-25 @ 08:30)    BP: 103/68 (05-28-25 @ 08:00) (103/68 - 159/71)Vital Signs Last 24 Hrs  T(C): 36.8 (05-28-25 @ 08:00), Max: 37.1 (05-27-25 @ 16:22)  T(F): 98.2 (05-28-25 @ 08:00), Max: 98.7 (05-27-25 @ 16:22)  HR: 103 (05-28-25 @ 08:00) (87 - 103)  BP: 103/68 (05-28-25 @ 08:00) (103/68 - 125/82)  BP(mean): --  RR: --  SpO2: --      Lipid Panel: Date/Time: 05-21-25 @ 08:00  Cholesterol, Serum: 140  LDL Cholesterol Calculated: 64  HDL Cholesterol, Serum: 55  Total Cholesterol/HDL Ration Measurement: --  Triglycerides, Serum: 121   BMI: BMI (kg/m2): 29.8 (05-08-25 @ 14:42)  HbA1c: A1C with Estimated Average Glucose Result: 5.8 % (04-25-25 @ 04:21)    Glucose: POCT Blood Glucose.: 150 mg/dL (05-03-25 @ 08:30)    BP: 123/73 (05-28-25 @ 16:28) (103/68 - 133/80)Vital Signs Last 24 Hrs  T(C): 36.9 (05-28-25 @ 16:28), Max: 36.9 (05-28-25 @ 16:28)  T(F): 98.5 (05-28-25 @ 16:28), Max: 98.5 (05-28-25 @ 16:28)  HR: 92 (05-28-25 @ 16:28) (92 - 103)  BP: 123/73 (05-28-25 @ 16:28) (103/68 - 123/73)  BP(mean): --  RR: --  SpO2: --      Lipid Panel: Date/Time: 05-21-25 @ 08:00  Cholesterol, Serum: 140  LDL Cholesterol Calculated: 64  HDL Cholesterol, Serum: 55  Total Cholesterol/HDL Ration Measurement: --  Triglycerides, Serum: 121

## 2025-05-28 NOTE — BH INPATIENT PSYCHIATRY PROGRESS NOTE - CURRENT MEDICATION
MEDICATIONS  (STANDING):  cetirizine 10 milliGRAM(s) Oral daily  divalproex  milliGRAM(s) Oral at bedtime  haloperidol    Injectable 10 milliGRAM(s) IntraMuscular at bedtime  risperiDONE   Tablet 3 milliGRAM(s) Oral at bedtime    MEDICATIONS  (PRN):  acetaminophen     Tablet .. 650 milliGRAM(s) Oral every 6 hours PRN Mild Pain (1 - 3), Moderate Pain (4 - 6)  haloperidol     Tablet 5 milliGRAM(s) Oral every 6 hours PRN agitation  hydrOXYzine hydrochloride 50 milliGRAM(s) Oral every 6 hours PRN Anxiety  nicotine - 21 mG/24Hr(s) Patch 1 Patch Transdermal daily PRN tobacco craving   MEDICATIONS  (STANDING):  cetirizine 10 milliGRAM(s) Oral daily  divalproex  milliGRAM(s) Oral at bedtime  haloperidol    Injectable 7.5 milliGRAM(s) IntraMuscular every 12 hours  risperiDONE   Tablet 2 milliGRAM(s) Oral every 12 hours    MEDICATIONS  (PRN):  acetaminophen     Tablet .. 650 milliGRAM(s) Oral every 6 hours PRN Mild Pain (1 - 3), Moderate Pain (4 - 6)  haloperidol     Tablet 5 milliGRAM(s) Oral every 6 hours PRN agitation  hydrOXYzine hydrochloride 50 milliGRAM(s) Oral every 6 hours PRN Anxiety  nicotine - 21 mG/24Hr(s) Patch 1 Patch Transdermal daily PRN tobacco craving

## 2025-05-28 NOTE — BH INPATIENT PSYCHIATRY PROGRESS NOTE - NSBHCHARTREVIEWVS_PSY_A_CORE FT
Vital Signs Last 24 Hrs  T(C): 36.8 (05-28-25 @ 08:00), Max: 37.1 (05-27-25 @ 16:22)  T(F): 98.2 (05-28-25 @ 08:00), Max: 98.7 (05-27-25 @ 16:22)  HR: 103 (05-28-25 @ 08:00) (87 - 103)  BP: 103/68 (05-28-25 @ 08:00) (103/68 - 125/82)  BP(mean): --  RR: --  SpO2: --     Vital Signs Last 24 Hrs  T(C): 36.9 (05-28-25 @ 16:28), Max: 36.9 (05-28-25 @ 16:28)  T(F): 98.5 (05-28-25 @ 16:28), Max: 98.5 (05-28-25 @ 16:28)  HR: 92 (05-28-25 @ 16:28) (92 - 103)  BP: 123/73 (05-28-25 @ 16:28) (103/68 - 123/73)  BP(mean): --  RR: --  SpO2: --

## 2025-05-28 NOTE — BH INPATIENT PSYCHIATRY PROGRESS NOTE - NSBHATTESTCOMMENTATTENDFT_PSY_A_CORE
Interviewed the patient with the medical student Odalys. Patient remains with labile, at times angry, affect, with persecutory thoughts and odd relatedness. Agree with the assessment and plan, increase risperidone to 2mg PO BID

## 2025-05-29 PROCEDURE — 99232 SBSQ HOSP IP/OBS MODERATE 35: CPT

## 2025-05-29 RX ADMIN — HYDROXYZINE HYDROCHLORIDE 50 MILLIGRAM(S): 25 TABLET, FILM COATED ORAL at 21:00

## 2025-05-29 RX ADMIN — Medication 10 MILLIGRAM(S): at 09:52

## 2025-05-29 RX ADMIN — Medication 2 MILLIGRAM(S): at 09:52

## 2025-05-29 RX ADMIN — HALOPERIDOL 5 MILLIGRAM(S): 10 TABLET ORAL at 01:21

## 2025-05-29 RX ADMIN — HYDROXYZINE HYDROCHLORIDE 50 MILLIGRAM(S): 25 TABLET, FILM COATED ORAL at 16:39

## 2025-05-29 RX ADMIN — Medication 750 MILLIGRAM(S): at 20:35

## 2025-05-29 RX ADMIN — Medication 2 MILLIGRAM(S): at 20:35

## 2025-05-29 RX ADMIN — HYDROXYZINE HYDROCHLORIDE 50 MILLIGRAM(S): 25 TABLET, FILM COATED ORAL at 10:34

## 2025-05-29 NOTE — BH INPATIENT PSYCHIATRY PROGRESS NOTE - NSBHMETABOLIC_PSY_ALL_CORE_FT
BMI: BMI (kg/m2): 29.8 (05-08-25 @ 14:42)  HbA1c: A1C with Estimated Average Glucose Result: 5.8 % (04-25-25 @ 04:21)    Glucose: POCT Blood Glucose.: 150 mg/dL (05-03-25 @ 08:30)    BP: 125/78 (05-29-25 @ 08:26) (103/68 - 133/80)Vital Signs Last 24 Hrs  T(C): 36.3 (05-29-25 @ 08:26), Max: 36.3 (05-29-25 @ 08:26)  T(F): 97.3 (05-29-25 @ 08:26), Max: 97.3 (05-29-25 @ 08:26)  HR: 114 (05-29-25 @ 08:26) (114 - 114)  BP: 125/78 (05-29-25 @ 08:26) (125/78 - 125/78)  BP(mean): --  RR: --  SpO2: --      Lipid Panel: Date/Time: 05-21-25 @ 08:00  Cholesterol, Serum: 140  LDL Cholesterol Calculated: 64  HDL Cholesterol, Serum: 55  Total Cholesterol/HDL Ration Measurement: --  Triglycerides, Serum: 121

## 2025-05-29 NOTE — BH INPATIENT PSYCHIATRY PROGRESS NOTE - NSBHCHARTREVIEWVS_PSY_A_CORE FT
Vital Signs Last 24 Hrs  T(C): 36.3 (05-29-25 @ 08:26), Max: 36.3 (05-29-25 @ 08:26)  T(F): 97.3 (05-29-25 @ 08:26), Max: 97.3 (05-29-25 @ 08:26)  HR: 114 (05-29-25 @ 08:26) (114 - 114)  BP: 125/78 (05-29-25 @ 08:26) (125/78 - 125/78)  BP(mean): --  RR: --  SpO2: --
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
## 2025-05-29 NOTE — BH INPATIENT PSYCHIATRY PROGRESS NOTE - NSBHFUPINTERVALHXFT_PSY_A_CORE
As per nursing, patient has been irritable, disorganised, at times loud and sarcastic, but adherent to medications. Patient has been seen walking in hallway. Manda was interviewed in her room, presented friendlier then usual and complimented me on my shirt. She reports feeling well, reports good appetite and sleep, ruminates on "bathing products" reportedly taken from her by staff. She reports good mood, denies AH, and presents less intense during this interview.

## 2025-05-29 NOTE — BH INPATIENT PSYCHIATRY PROGRESS NOTE - OTHER
Guarded  Somewhat tangential "good" Appears dishevelled  guarded and suspecting denies AH but at times appears to be responding to internal stimuli.

## 2025-05-29 NOTE — BH INPATIENT PSYCHIATRY PROGRESS NOTE - CURRENT MEDICATION
MEDICATIONS  (STANDING):  cetirizine 10 milliGRAM(s) Oral daily  divalproex  milliGRAM(s) Oral at bedtime  haloperidol    Injectable 7.5 milliGRAM(s) IntraMuscular every 12 hours  risperiDONE   Tablet 2 milliGRAM(s) Oral every 12 hours    MEDICATIONS  (PRN):  acetaminophen     Tablet .. 650 milliGRAM(s) Oral every 6 hours PRN Mild Pain (1 - 3), Moderate Pain (4 - 6)  haloperidol     Tablet 5 milliGRAM(s) Oral every 6 hours PRN agitation  hydrOXYzine hydrochloride 50 milliGRAM(s) Oral every 6 hours PRN Anxiety  nicotine - 21 mG/24Hr(s) Patch 1 Patch Transdermal daily PRN tobacco craving

## 2025-05-30 PROCEDURE — 99232 SBSQ HOSP IP/OBS MODERATE 35: CPT

## 2025-05-30 RX ORDER — LORAZEPAM 4 MG/ML
2 VIAL (ML) INJECTION ONCE
Refills: 0 | Status: DISCONTINUED | OUTPATIENT
Start: 2025-05-30 | End: 2025-05-30

## 2025-05-30 RX ORDER — HALOPERIDOL 10 MG/1
5 TABLET ORAL ONCE
Refills: 0 | Status: COMPLETED | OUTPATIENT
Start: 2025-05-30 | End: 2025-05-30

## 2025-05-30 RX ORDER — DIPHENHYDRAMINE HCL 12.5MG/5ML
25 ELIXIR ORAL ONCE
Refills: 0 | Status: COMPLETED | OUTPATIENT
Start: 2025-05-30 | End: 2025-05-30

## 2025-05-30 RX ORDER — RISPERIDONE 4 MG
2.5 TABLET ORAL EVERY 12 HOURS
Refills: 0 | Status: DISCONTINUED | OUTPATIENT
Start: 2025-05-30 | End: 2025-06-02

## 2025-05-30 RX ADMIN — HYDROXYZINE HYDROCHLORIDE 50 MILLIGRAM(S): 25 TABLET, FILM COATED ORAL at 10:58

## 2025-05-30 RX ADMIN — Medication 2.5 MILLIGRAM(S): at 20:25

## 2025-05-30 RX ADMIN — Medication 10 MILLIGRAM(S): at 08:12

## 2025-05-30 RX ADMIN — Medication 2 MILLIGRAM(S): at 13:01

## 2025-05-30 RX ADMIN — HYDROXYZINE HYDROCHLORIDE 50 MILLIGRAM(S): 25 TABLET, FILM COATED ORAL at 03:57

## 2025-05-30 RX ADMIN — Medication 750 MILLIGRAM(S): at 20:25

## 2025-05-30 RX ADMIN — HALOPERIDOL 5 MILLIGRAM(S): 10 TABLET ORAL at 13:00

## 2025-05-30 RX ADMIN — Medication 2 MILLIGRAM(S): at 08:13

## 2025-05-30 RX ADMIN — Medication 25 MILLIGRAM(S): at 13:01

## 2025-05-30 NOTE — BH INPATIENT PSYCHIATRY PROGRESS NOTE - CURRENT MEDICATION
MEDICATIONS  (STANDING):  cetirizine 10 milliGRAM(s) Oral daily  divalproex  milliGRAM(s) Oral at bedtime  haloperidol    Injectable 7.5 milliGRAM(s) IntraMuscular every 12 hours  risperiDONE   Tablet 2.5 milliGRAM(s) Oral every 12 hours    MEDICATIONS  (PRN):  acetaminophen     Tablet .. 650 milliGRAM(s) Oral every 6 hours PRN Mild Pain (1 - 3), Moderate Pain (4 - 6)  haloperidol     Tablet 5 milliGRAM(s) Oral every 6 hours PRN agitation  hydrOXYzine hydrochloride 50 milliGRAM(s) Oral every 6 hours PRN Anxiety  nicotine - 21 mG/24Hr(s) Patch 1 Patch Transdermal daily PRN tobacco craving
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
## 2025-05-30 NOTE — BH INPATIENT PSYCHIATRY PROGRESS NOTE - NSBHMETABOLIC_PSY_ALL_CORE_FT
BMI: BMI (kg/m2): 29.8 (05-08-25 @ 14:42)  HbA1c: A1C with Estimated Average Glucose Result: 5.8 % (04-25-25 @ 04:21)    Glucose: POCT Blood Glucose.: 150 mg/dL (05-03-25 @ 08:30)    BP: 109/71 (05-30-25 @ 16:09) (103/68 - 125/78)Vital Signs Last 24 Hrs  T(C): 36.4 (05-30-25 @ 16:09), Max: 36.6 (05-30-25 @ 08:33)  T(F): 97.6 (05-30-25 @ 16:09), Max: 97.9 (05-30-25 @ 08:33)  HR: 120 (05-30-25 @ 16:09) (88 - 120)  BP: 109/71 (05-30-25 @ 16:09) (109/71 - 116/67)  BP(mean): --  RR: --  SpO2: --      Lipid Panel: Date/Time: 05-21-25 @ 08:00  Cholesterol, Serum: 140  LDL Cholesterol Calculated: 64  HDL Cholesterol, Serum: 55  Total Cholesterol/HDL Ration Measurement: --  Triglycerides, Serum: 121

## 2025-05-30 NOTE — BH INPATIENT PSYCHIATRY PROGRESS NOTE - OTHER
Appears dishevelled  Guarded  guarded and suspecting Somewhat tangential denies AH but at times appears to be responding to internal stimuli. "good"

## 2025-05-30 NOTE — BH INPATIENT PSYCHIATRY PROGRESS NOTE - NSBHCHARTREVIEWVS_PSY_A_CORE FT
Vital Signs Last 24 Hrs  T(C): 36.4 (05-30-25 @ 16:09), Max: 36.6 (05-30-25 @ 08:33)  T(F): 97.6 (05-30-25 @ 16:09), Max: 97.9 (05-30-25 @ 08:33)  HR: 120 (05-30-25 @ 16:09) (88 - 120)  BP: 109/71 (05-30-25 @ 16:09) (109/71 - 116/67)  BP(mean): --  RR: --  SpO2: --
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
## 2025-05-30 NOTE — BH INPATIENT PSYCHIATRY PROGRESS NOTE - NSBHMSESPABN_PSY_A_CORE
Loud volume

## 2025-05-30 NOTE — BH INPATIENT PSYCHIATRY PROGRESS NOTE - NSBHCHARTREVIEWLAB_PSY_A_CORE FT
Comprehensive Metabolic Panel (05.05.25 @ 11:31)   Sodium: 139 mmol/L  Potassium: 4.3 mmol/L  Chloride: 101 mmol/L  Carbon Dioxide: 25 mmol/L  Anion Gap: 13 mmol/L  Blood Urea Nitrogen: 11 mg/dL  Creatinine: <0.5 mg/dL  Glucose: 79 mg/dL  Calcium: 8.6 mg/dL  Protein Total: 5.2 g/dL  Albumin: 3.1 g/dL  Bilirubin Total: 0.2 mg/dL  Alkaline Phosphatase: 100 U/L  Aspartate Aminotransferase (AST/SGOT): 17 U/L  Alanine Aminotransferase (ALT/SGPT): 38 U/L  eGFR: 115: The estimated glomerular filtration rate (eGFR) calculation is based on   the 2021 CKD-EPI creatinine equation, which is validated in male and   female population 18 years of age and older (N Engl J Med 2021;   385:0654-9674). mL/min/1.73m2  
Comprehensive Metabolic Panel (05.05.25 @ 11:31)   Sodium: 139 mmol/L  Potassium: 4.3 mmol/L  Chloride: 101 mmol/L  Carbon Dioxide: 25 mmol/L  Anion Gap: 13 mmol/L  Blood Urea Nitrogen: 11 mg/dL  Creatinine: <0.5 mg/dL  Glucose: 79 mg/dL  Calcium: 8.6 mg/dL  Protein Total: 5.2 g/dL  Albumin: 3.1 g/dL  Bilirubin Total: 0.2 mg/dL  Alkaline Phosphatase: 100 U/L  Aspartate Aminotransferase (AST/SGOT): 17 U/L  Alanine Aminotransferase (ALT/SGPT): 38 U/L  eGFR: 115: The estimated glomerular filtration rate (eGFR) calculation is based on   the 2021 CKD-EPI creatinine equation, which is validated in male and   female population 18 years of age and older (N Engl J Med 2021;   385:8508-9462). mL/min/1.73m2  
Comprehensive Metabolic Panel (05.05.25 @ 11:31)   Sodium: 139 mmol/L  Potassium: 4.3 mmol/L  Chloride: 101 mmol/L  Carbon Dioxide: 25 mmol/L  Anion Gap: 13 mmol/L  Blood Urea Nitrogen: 11 mg/dL  Creatinine: <0.5 mg/dL  Glucose: 79 mg/dL  Calcium: 8.6 mg/dL  Protein Total: 5.2 g/dL  Albumin: 3.1 g/dL  Bilirubin Total: 0.2 mg/dL  Alkaline Phosphatase: 100 U/L  Aspartate Aminotransferase (AST/SGOT): 17 U/L  Alanine Aminotransferase (ALT/SGPT): 38 U/L  eGFR: 115: The estimated glomerular filtration rate (eGFR) calculation is based on   the 2021 CKD-EPI creatinine equation, which is validated in male and   female population 18 years of age and older (N Engl J Med 2021;   [id number]:5983-0665). mL/min/1.73m2  
Comprehensive Metabolic Panel (05.05.25 @ 11:31)   Sodium: 139 mmol/L  Potassium: 4.3 mmol/L  Chloride: 101 mmol/L  Carbon Dioxide: 25 mmol/L  Anion Gap: 13 mmol/L  Blood Urea Nitrogen: 11 mg/dL  Creatinine: <0.5 mg/dL  Glucose: 79 mg/dL  Calcium: 8.6 mg/dL  Protein Total: 5.2 g/dL  Albumin: 3.1 g/dL  Bilirubin Total: 0.2 mg/dL  Alkaline Phosphatase: 100 U/L  Aspartate Aminotransferase (AST/SGOT): 17 U/L  Alanine Aminotransferase (ALT/SGPT): 38 U/L  eGFR: 115: The estimated glomerular filtration rate (eGFR) calculation is based on   the 2021 CKD-EPI creatinine equation, which is validated in male and   female population 18 years of age and older (N Engl J Med 2021;   385:3451-5345). mL/min/1.73m2  
Comprehensive Metabolic Panel (05.05.25 @ 11:31)   Sodium: 139 mmol/L  Potassium: 4.3 mmol/L  Chloride: 101 mmol/L  Carbon Dioxide: 25 mmol/L  Anion Gap: 13 mmol/L  Blood Urea Nitrogen: 11 mg/dL  Creatinine: <0.5 mg/dL  Glucose: 79 mg/dL  Calcium: 8.6 mg/dL  Protein Total: 5.2 g/dL  Albumin: 3.1 g/dL  Bilirubin Total: 0.2 mg/dL  Alkaline Phosphatase: 100 U/L  Aspartate Aminotransferase (AST/SGOT): 17 U/L  Alanine Aminotransferase (ALT/SGPT): 38 U/L  eGFR: 115: The estimated glomerular filtration rate (eGFR) calculation is based on   the 2021 CKD-EPI creatinine equation, which is validated in male and   female population 18 years of age and older (N Engl J Med 2021;   385:3904-3557). mL/min/1.73m2  
Comprehensive Metabolic Panel (05.05.25 @ 11:31)   Sodium: 139 mmol/L  Potassium: 4.3 mmol/L  Chloride: 101 mmol/L  Carbon Dioxide: 25 mmol/L  Anion Gap: 13 mmol/L  Blood Urea Nitrogen: 11 mg/dL  Creatinine: <0.5 mg/dL  Glucose: 79 mg/dL  Calcium: 8.6 mg/dL  Protein Total: 5.2 g/dL  Albumin: 3.1 g/dL  Bilirubin Total: 0.2 mg/dL  Alkaline Phosphatase: 100 U/L  Aspartate Aminotransferase (AST/SGOT): 17 U/L  Alanine Aminotransferase (ALT/SGPT): 38 U/L  eGFR: 115: The estimated glomerular filtration rate (eGFR) calculation is based on   the 2021 CKD-EPI creatinine equation, which is validated in male and   female population 18 years of age and older (N Engl J Med 2021;   [id number]). mL/min/1.73m2  
Comprehensive Metabolic Panel (05.05.25 @ 11:31)   Sodium: 139 mmol/L  Potassium: 4.3 mmol/L  Chloride: 101 mmol/L  Carbon Dioxide: 25 mmol/L  Anion Gap: 13 mmol/L  Blood Urea Nitrogen: 11 mg/dL  Creatinine: <0.5 mg/dL  Glucose: 79 mg/dL  Calcium: 8.6 mg/dL  Protein Total: 5.2 g/dL  Albumin: 3.1 g/dL  Bilirubin Total: 0.2 mg/dL  Alkaline Phosphatase: 100 U/L  Aspartate Aminotransferase (AST/SGOT): 17 U/L  Alanine Aminotransferase (ALT/SGPT): 38 U/L  eGFR: 115: The estimated glomerular filtration rate (eGFR) calculation is based on   the 2021 CKD-EPI creatinine equation, which is validated in male and   female population 18 years of age and older (N Engl J Med 2021;   385:2470-9934). mL/min/1.73m2  
Comprehensive Metabolic Panel (05.05.25 @ 11:31)   Sodium: 139 mmol/L  Potassium: 4.3 mmol/L  Chloride: 101 mmol/L  Carbon Dioxide: 25 mmol/L  Anion Gap: 13 mmol/L  Blood Urea Nitrogen: 11 mg/dL  Creatinine: <0.5 mg/dL  Glucose: 79 mg/dL  Calcium: 8.6 mg/dL  Protein Total: 5.2 g/dL  Albumin: 3.1 g/dL  Bilirubin Total: 0.2 mg/dL  Alkaline Phosphatase: 100 U/L  Aspartate Aminotransferase (AST/SGOT): 17 U/L  Alanine Aminotransferase (ALT/SGPT): 38 U/L  eGFR: 115: The estimated glomerular filtration rate (eGFR) calculation is based on   the 2021 CKD-EPI creatinine equation, which is validated in male and   female population 18 years of age and older (N Engl J Med 2021;   385:2631-5486). mL/min/1.73m2  
Comprehensive Metabolic Panel (05.05.25 @ 11:31)   Sodium: 139 mmol/L  Potassium: 4.3 mmol/L  Chloride: 101 mmol/L  Carbon Dioxide: 25 mmol/L  Anion Gap: 13 mmol/L  Blood Urea Nitrogen: 11 mg/dL  Creatinine: <0.5 mg/dL  Glucose: 79 mg/dL  Calcium: 8.6 mg/dL  Protein Total: 5.2 g/dL  Albumin: 3.1 g/dL  Bilirubin Total: 0.2 mg/dL  Alkaline Phosphatase: 100 U/L  Aspartate Aminotransferase (AST/SGOT): 17 U/L  Alanine Aminotransferase (ALT/SGPT): 38 U/L  eGFR: 115: The estimated glomerular filtration rate (eGFR) calculation is based on   the 2021 CKD-EPI creatinine equation, which is validated in male and   female population 18 years of age and older (N Engl J Med 2021;   [id number]). mL/min/1.73m2  
Comprehensive Metabolic Panel (05.05.25 @ 11:31)   Sodium: 139 mmol/L  Potassium: 4.3 mmol/L  Chloride: 101 mmol/L  Carbon Dioxide: 25 mmol/L  Anion Gap: 13 mmol/L  Blood Urea Nitrogen: 11 mg/dL  Creatinine: <0.5 mg/dL  Glucose: 79 mg/dL  Calcium: 8.6 mg/dL  Protein Total: 5.2 g/dL  Albumin: 3.1 g/dL  Bilirubin Total: 0.2 mg/dL  Alkaline Phosphatase: 100 U/L  Aspartate Aminotransferase (AST/SGOT): 17 U/L  Alanine Aminotransferase (ALT/SGPT): 38 U/L  eGFR: 115: The estimated glomerular filtration rate (eGFR) calculation is based on   the 2021 CKD-EPI creatinine equation, which is validated in male and   female population 18 years of age and older (N Engl J Med 2021;   [id number]). mL/min/1.73m2  
Comprehensive Metabolic Panel (05.05.25 @ 11:31)   Sodium: 139 mmol/L  Potassium: 4.3 mmol/L  Chloride: 101 mmol/L  Carbon Dioxide: 25 mmol/L  Anion Gap: 13 mmol/L  Blood Urea Nitrogen: 11 mg/dL  Creatinine: <0.5 mg/dL  Glucose: 79 mg/dL  Calcium: 8.6 mg/dL  Protein Total: 5.2 g/dL  Albumin: 3.1 g/dL  Bilirubin Total: 0.2 mg/dL  Alkaline Phosphatase: 100 U/L  Aspartate Aminotransferase (AST/SGOT): 17 U/L  Alanine Aminotransferase (ALT/SGPT): 38 U/L  eGFR: 115: The estimated glomerular filtration rate (eGFR) calculation is based on   the 2021 CKD-EPI creatinine equation, which is validated in male and   female population 18 years of age and older (N Engl J Med 2021;   [id number]). mL/min/1.73m2  
Comprehensive Metabolic Panel (05.05.25 @ 11:31)   Sodium: 139 mmol/L  Potassium: 4.3 mmol/L  Chloride: 101 mmol/L  Carbon Dioxide: 25 mmol/L  Anion Gap: 13 mmol/L  Blood Urea Nitrogen: 11 mg/dL  Creatinine: <0.5 mg/dL  Glucose: 79 mg/dL  Calcium: 8.6 mg/dL  Protein Total: 5.2 g/dL  Albumin: 3.1 g/dL  Bilirubin Total: 0.2 mg/dL  Alkaline Phosphatase: 100 U/L  Aspartate Aminotransferase (AST/SGOT): 17 U/L  Alanine Aminotransferase (ALT/SGPT): 38 U/L  eGFR: 115: The estimated glomerular filtration rate (eGFR) calculation is based on   the 2021 CKD-EPI creatinine equation, which is validated in male and   female population 18 years of age and older (N Engl J Med 2021;   385:2482-9934). mL/min/1.73m2  
Comprehensive Metabolic Panel (05.05.25 @ 11:31)   Sodium: 139 mmol/L  Potassium: 4.3 mmol/L  Chloride: 101 mmol/L  Carbon Dioxide: 25 mmol/L  Anion Gap: 13 mmol/L  Blood Urea Nitrogen: 11 mg/dL  Creatinine: <0.5 mg/dL  Glucose: 79 mg/dL  Calcium: 8.6 mg/dL  Protein Total: 5.2 g/dL  Albumin: 3.1 g/dL  Bilirubin Total: 0.2 mg/dL  Alkaline Phosphatase: 100 U/L  Aspartate Aminotransferase (AST/SGOT): 17 U/L  Alanine Aminotransferase (ALT/SGPT): 38 U/L  eGFR: 115: The estimated glomerular filtration rate (eGFR) calculation is based on   the 2021 CKD-EPI creatinine equation, which is validated in male and   female population 18 years of age and older (N Engl J Med 2021;   385:9383-5502). mL/min/1.73m2  
Comprehensive Metabolic Panel (05.05.25 @ 11:31)   Sodium: 139 mmol/L  Potassium: 4.3 mmol/L  Chloride: 101 mmol/L  Carbon Dioxide: 25 mmol/L  Anion Gap: 13 mmol/L  Blood Urea Nitrogen: 11 mg/dL  Creatinine: <0.5 mg/dL  Glucose: 79 mg/dL  Calcium: 8.6 mg/dL  Protein Total: 5.2 g/dL  Albumin: 3.1 g/dL  Bilirubin Total: 0.2 mg/dL  Alkaline Phosphatase: 100 U/L  Aspartate Aminotransferase (AST/SGOT): 17 U/L  Alanine Aminotransferase (ALT/SGPT): 38 U/L  eGFR: 115: The estimated glomerular filtration rate (eGFR) calculation is based on   the 2021 CKD-EPI creatinine equation, which is validated in male and   female population 18 years of age and older (N Engl J Med 2021;   385:1157-1161). mL/min/1.73m2  
Comprehensive Metabolic Panel (05.05.25 @ 11:31)   Sodium: 139 mmol/L  Potassium: 4.3 mmol/L  Chloride: 101 mmol/L  Carbon Dioxide: 25 mmol/L  Anion Gap: 13 mmol/L  Blood Urea Nitrogen: 11 mg/dL  Creatinine: <0.5 mg/dL  Glucose: 79 mg/dL  Calcium: 8.6 mg/dL  Protein Total: 5.2 g/dL  Albumin: 3.1 g/dL  Bilirubin Total: 0.2 mg/dL  Alkaline Phosphatase: 100 U/L  Aspartate Aminotransferase (AST/SGOT): 17 U/L  Alanine Aminotransferase (ALT/SGPT): 38 U/L  eGFR: 115: The estimated glomerular filtration rate (eGFR) calculation is based on   the 2021 CKD-EPI creatinine equation, which is validated in male and   female population 18 years of age and older (N Engl J Med 2021;   [id number]). mL/min/1.73m2  
Comprehensive Metabolic Panel (05.05.25 @ 11:31)   Sodium: 139 mmol/L  Potassium: 4.3 mmol/L  Chloride: 101 mmol/L  Carbon Dioxide: 25 mmol/L  Anion Gap: 13 mmol/L  Blood Urea Nitrogen: 11 mg/dL  Creatinine: <0.5 mg/dL  Glucose: 79 mg/dL  Calcium: 8.6 mg/dL  Protein Total: 5.2 g/dL  Albumin: 3.1 g/dL  Bilirubin Total: 0.2 mg/dL  Alkaline Phosphatase: 100 U/L  Aspartate Aminotransferase (AST/SGOT): 17 U/L  Alanine Aminotransferase (ALT/SGPT): 38 U/L  eGFR: 115: The estimated glomerular filtration rate (eGFR) calculation is based on   the 2021 CKD-EPI creatinine equation, which is validated in male and   female population 18 years of age and older (N Engl J Med 2021;   [id number]). mL/min/1.73m2  
Comprehensive Metabolic Panel (05.05.25 @ 11:31)   Sodium: 139 mmol/L  Potassium: 4.3 mmol/L  Chloride: 101 mmol/L  Carbon Dioxide: 25 mmol/L  Anion Gap: 13 mmol/L  Blood Urea Nitrogen: 11 mg/dL  Creatinine: <0.5 mg/dL  Glucose: 79 mg/dL  Calcium: 8.6 mg/dL  Protein Total: 5.2 g/dL  Albumin: 3.1 g/dL  Bilirubin Total: 0.2 mg/dL  Alkaline Phosphatase: 100 U/L  Aspartate Aminotransferase (AST/SGOT): 17 U/L  Alanine Aminotransferase (ALT/SGPT): 38 U/L  eGFR: 115: The estimated glomerular filtration rate (eGFR) calculation is based on   the 2021 CKD-EPI creatinine equation, which is validated in male and   female population 18 years of age and older (N Engl J Med 2021;   [id number]). mL/min/1.73m2  
Comprehensive Metabolic Panel (05.05.25 @ 11:31)   Sodium: 139 mmol/L  Potassium: 4.3 mmol/L  Chloride: 101 mmol/L  Carbon Dioxide: 25 mmol/L  Anion Gap: 13 mmol/L  Blood Urea Nitrogen: 11 mg/dL  Creatinine: <0.5 mg/dL  Glucose: 79 mg/dL  Calcium: 8.6 mg/dL  Protein Total: 5.2 g/dL  Albumin: 3.1 g/dL  Bilirubin Total: 0.2 mg/dL  Alkaline Phosphatase: 100 U/L  Aspartate Aminotransferase (AST/SGOT): 17 U/L  Alanine Aminotransferase (ALT/SGPT): 38 U/L  eGFR: 115: The estimated glomerular filtration rate (eGFR) calculation is based on   the 2021 CKD-EPI creatinine equation, which is validated in male and   female population 18 years of age and older (N Engl J Med 2021;   [id number]). mL/min/1.73m2  
Comprehensive Metabolic Panel (05.05.25 @ 11:31)   Sodium: 139 mmol/L  Potassium: 4.3 mmol/L  Chloride: 101 mmol/L  Carbon Dioxide: 25 mmol/L  Anion Gap: 13 mmol/L  Blood Urea Nitrogen: 11 mg/dL  Creatinine: <0.5 mg/dL  Glucose: 79 mg/dL  Calcium: 8.6 mg/dL  Protein Total: 5.2 g/dL  Albumin: 3.1 g/dL  Bilirubin Total: 0.2 mg/dL  Alkaline Phosphatase: 100 U/L  Aspartate Aminotransferase (AST/SGOT): 17 U/L  Alanine Aminotransferase (ALT/SGPT): 38 U/L  eGFR: 115: The estimated glomerular filtration rate (eGFR) calculation is based on   the 2021 CKD-EPI creatinine equation, which is validated in male and   female population 18 years of age and older (N Engl J Med 2021;   [id number]). mL/min/1.73m2

## 2025-05-31 PROCEDURE — 99231 SBSQ HOSP IP/OBS SF/LOW 25: CPT

## 2025-05-31 RX ADMIN — Medication 2.5 MILLIGRAM(S): at 20:12

## 2025-05-31 RX ADMIN — HYDROXYZINE HYDROCHLORIDE 50 MILLIGRAM(S): 25 TABLET, FILM COATED ORAL at 14:15

## 2025-05-31 RX ADMIN — Medication 10 MILLIGRAM(S): at 08:04

## 2025-05-31 RX ADMIN — Medication 2.5 MILLIGRAM(S): at 08:24

## 2025-05-31 RX ADMIN — NICOTINE POLACRILEX 1 PATCH: 4 GUM, CHEWING ORAL at 18:23

## 2025-05-31 RX ADMIN — HYDROXYZINE HYDROCHLORIDE 50 MILLIGRAM(S): 25 TABLET, FILM COATED ORAL at 08:04

## 2025-05-31 RX ADMIN — Medication 750 MILLIGRAM(S): at 20:12

## 2025-05-31 NOTE — BH INPATIENT PSYCHIATRY PROGRESS NOTE - NSBHATTESTCOMMENTATTENDFT_PSY_A_CORE
Pt was seen and evaluated.  Agree with the written above.  Pt is irritable (but not aggressive). TP-illogical and disorganized.   Patient denies any current passive or active suicidal ideation, intent or plan and denies any homicidal ideation.   Treatment plan was discussed  Continue 1:1
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
## 2025-05-31 NOTE — BH INPATIENT PSYCHIATRY PROGRESS NOTE - NSBHFUPINTERVALHXFT_PSY_A_CORE
Patient was seen and evaluated this morning. Chart was reviewed and no acute events were noted. No PRN medications were administered overnight. Upon approach, patient walking on hallway asking to speak to doctor. Walked with patient to discuss with 1:1 at bedside. Patient is alert and oriented and engages with interviewer. Patient is cooperative and answers few questions appropriately. Patient tangential but perseverates on topics such as discharge, being trapped in her room. She makes racist comments towards staff saying she is being held hostage. Easily redirected, but continues to wander in halls.  Patient denies any current passive or active suicidal ideation, intent or plan. Patient reports good sleep and appetite. Denies side effects from medications.  0 = independent

## 2025-05-31 NOTE — BH INPATIENT PSYCHIATRY PROGRESS NOTE - NSBHMETABOLIC_PSY_ALL_CORE_FT
BMI: BMI (kg/m2): 29.8 (05-08-25 @ 14:42)  HbA1c: A1C with Estimated Average Glucose Result: 5.8 % (04-25-25 @ 04:21)    Glucose: POCT Blood Glucose.: 150 mg/dL (05-03-25 @ 08:30)    BP: 119/78 (05-31-25 @ 08:30) (109/71 - 125/78)Vital Signs Last 24 Hrs  T(C): 36.7 (05-31-25 @ 08:30), Max: 36.7 (05-31-25 @ 08:30)  T(F): 98.1 (05-31-25 @ 08:30), Max: 98.1 (05-31-25 @ 08:30)  HR: 112 (05-31-25 @ 08:30) (112 - 120)  BP: 119/78 (05-31-25 @ 08:30) (109/71 - 119/78)  BP(mean): --  RR: --  SpO2: --      Lipid Panel: Date/Time: 05-21-25 @ 08:00  Cholesterol, Serum: 140  LDL Cholesterol Calculated: 64  HDL Cholesterol, Serum: 55  Total Cholesterol/HDL Ration Measurement: --  Triglycerides, Serum: 121

## 2025-05-31 NOTE — BH INPATIENT PSYCHIATRY PROGRESS NOTE - NSBHCHARTREVIEWVS_PSY_A_CORE FT
Vital Signs Last 24 Hrs  T(C): 36.7 (05-31-25 @ 08:30), Max: 36.7 (05-31-25 @ 08:30)  T(F): 98.1 (05-31-25 @ 08:30), Max: 98.1 (05-31-25 @ 08:30)  HR: 112 (05-31-25 @ 08:30) (112 - 120)  BP: 119/78 (05-31-25 @ 08:30) (109/71 - 119/78)  BP(mean): --  RR: --  SpO2: --

## 2025-06-01 RX ADMIN — HYDROXYZINE HYDROCHLORIDE 50 MILLIGRAM(S): 25 TABLET, FILM COATED ORAL at 17:21

## 2025-06-01 RX ADMIN — NICOTINE POLACRILEX 1 PATCH: 4 GUM, CHEWING ORAL at 10:18

## 2025-06-01 RX ADMIN — HYDROXYZINE HYDROCHLORIDE 50 MILLIGRAM(S): 25 TABLET, FILM COATED ORAL at 05:35

## 2025-06-01 RX ADMIN — HALOPERIDOL 5 MILLIGRAM(S): 10 TABLET ORAL at 21:17

## 2025-06-01 RX ADMIN — Medication 10 MILLIGRAM(S): at 08:07

## 2025-06-01 RX ADMIN — Medication 2.5 MILLIGRAM(S): at 20:04

## 2025-06-01 RX ADMIN — HYDROXYZINE HYDROCHLORIDE 50 MILLIGRAM(S): 25 TABLET, FILM COATED ORAL at 11:37

## 2025-06-01 RX ADMIN — Medication 2.5 MILLIGRAM(S): at 08:07

## 2025-06-01 RX ADMIN — NICOTINE POLACRILEX 1 PATCH: 4 GUM, CHEWING ORAL at 08:07

## 2025-06-01 RX ADMIN — Medication 750 MILLIGRAM(S): at 20:05

## 2025-06-01 RX ADMIN — NICOTINE POLACRILEX 1 PATCH: 4 GUM, CHEWING ORAL at 18:16
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
## 2025-06-01 NOTE — BH INPATIENT PSYCHIATRY PROGRESS NOTE - NSBHMETABOLIC_PSY_ALL_CORE_FT
BMI: BMI (kg/m2): 29.8 (05-08-25 @ 14:42)  HbA1c: A1C with Estimated Average Glucose Result: 5.8 % (04-25-25 @ 04:21)    Glucose: POCT Blood Glucose.: 150 mg/dL (05-03-25 @ 08:30)    BP: 114/76 (06-01-25 @ 08:32) (109/71 - 147/82)Vital Signs Last 24 Hrs  T(C): 37 (06-01-25 @ 08:32), Max: 37 (05-31-25 @ 16:20)  T(F): 98.6 (06-01-25 @ 08:32), Max: 98.6 (05-31-25 @ 16:20)  HR: 101 (06-01-25 @ 08:32) (66 - 118)  BP: 114/76 (06-01-25 @ 08:32) (114/76 - 147/82)  BP(mean): --  RR: 16 (06-01-25 @ 06:25) (16 - 16)  SpO2: --      Lipid Panel: Date/Time: 05-21-25 @ 08:00  Cholesterol, Serum: 140  LDL Cholesterol Calculated: 64  HDL Cholesterol, Serum: 55  Total Cholesterol/HDL Ration Measurement: --  Triglycerides, Serum: 121
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
## 2025-06-01 NOTE — BH INPATIENT PSYCHIATRY PROGRESS NOTE - NSBHCHARTREVIEWVS_PSY_A_CORE FT
Vital Signs Last 24 Hrs  T(C): 37 (06-01-25 @ 08:32), Max: 37 (05-31-25 @ 16:20)  T(F): 98.6 (06-01-25 @ 08:32), Max: 98.6 (05-31-25 @ 16:20)  HR: 101 (06-01-25 @ 08:32) (66 - 118)  BP: 114/76 (06-01-25 @ 08:32) (114/76 - 147/82)  BP(mean): --  RR: 16 (06-01-25 @ 06:25) (16 - 16)  SpO2: --

## 2025-06-02 PROCEDURE — 99232 SBSQ HOSP IP/OBS MODERATE 35: CPT | Mod: GC

## 2025-06-02 RX ORDER — RISPERIDONE 4 MG
3 TABLET ORAL EVERY 12 HOURS
Refills: 0 | Status: DISCONTINUED | OUTPATIENT
Start: 2025-06-02 | End: 2025-06-06

## 2025-06-02 RX ADMIN — Medication 750 MILLIGRAM(S): at 20:20

## 2025-06-02 RX ADMIN — Medication 10 MILLIGRAM(S): at 08:07

## 2025-06-02 RX ADMIN — Medication 3 MILLIGRAM(S): at 20:20

## 2025-06-02 RX ADMIN — HYDROXYZINE HYDROCHLORIDE 50 MILLIGRAM(S): 25 TABLET, FILM COATED ORAL at 20:25

## 2025-06-02 RX ADMIN — HALOPERIDOL 5 MILLIGRAM(S): 10 TABLET ORAL at 21:57

## 2025-06-02 RX ADMIN — NICOTINE POLACRILEX 1 PATCH: 4 GUM, CHEWING ORAL at 08:10

## 2025-06-02 RX ADMIN — HYDROXYZINE HYDROCHLORIDE 50 MILLIGRAM(S): 25 TABLET, FILM COATED ORAL at 08:07

## 2025-06-02 RX ADMIN — NICOTINE POLACRILEX 1 PATCH: 4 GUM, CHEWING ORAL at 08:11

## 2025-06-02 RX ADMIN — Medication 2.5 MILLIGRAM(S): at 08:08

## 2025-06-02 RX ADMIN — HYDROXYZINE HYDROCHLORIDE 50 MILLIGRAM(S): 25 TABLET, FILM COATED ORAL at 14:13
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
## 2025-06-02 NOTE — BH INPATIENT PSYCHIATRY PROGRESS NOTE - NSBHFUPINTERVALHXFT_PSY_A_CORE
Patient was seen and evaluated this morning. Chart was reviewed and no acute events were noted. Patient received PRN Haldol 5mg at 9PM last night and PRN Atarax 50mg at 8AM. Staff reports patient was well-behaved over the weekend, and patient requested for the 1:1 to be discontinued.   Upon evaluation, patient was resting comfortably in bed. States that she is eating and sleeping ok, but that she feels more tired than usual. Denies feeling confused. Denies SI/HI/AVH. Denies side effects from medications. When asked about her depression, she states that she is angry because she wants to leave the hospital. She says that she lives in  but does not remember if she lived with anyone - refers to a friend "Sourav" but says she does not know what their relationship is.   Patient also reports going to groups and says she is able to control her behavior.   Patient spoke to daughter (Sherrell) over the phone, said that her daughter told her that "you are trying to make me crazy." Patient became irritated when asked if we could speak to her daughter and said she doesn't want her daughter involved. Would not provide us her phone number.

## 2025-06-02 NOTE — BH INPATIENT PSYCHIATRY PROGRESS NOTE - NSBHATTESTCOMMENTATTENDFT_PSY_A_CORE
Interviewed the patient with the resident Dr. Issa. Patient presents calmer, questioning some of her prior delusions, but still with episodes of disorganised behavior and with limited insight. Agree with the assessment and plan.

## 2025-06-02 NOTE — BH INPATIENT PSYCHIATRY PROGRESS NOTE - NSBHMETABOLIC_PSY_ALL_CORE_FT
BMI: BMI (kg/m2): 29.8 (05-08-25 @ 14:42)  HbA1c: A1C with Estimated Average Glucose Result: 5.8 % (04-25-25 @ 04:21)    Glucose: POCT Blood Glucose.: 150 mg/dL (05-03-25 @ 08:30)    BP: 145/81 (06-02-25 @ 10:01) (109/71 - 147/82)Vital Signs Last 24 Hrs  T(C): 36.4 (06-02-25 @ 10:01), Max: 37 (06-01-25 @ 16:03)  T(F): 97.5 (06-02-25 @ 10:01), Max: 98.6 (06-01-25 @ 16:03)  HR: 96 (06-02-25 @ 10:01) (96 - 120)  BP: 145/81 (06-02-25 @ 10:01) (141/83 - 145/81)  BP(mean): --  RR: --  SpO2: --      Lipid Panel: Date/Time: 05-21-25 @ 08:00  Cholesterol, Serum: 140  LDL Cholesterol Calculated: 64  HDL Cholesterol, Serum: 55  Total Cholesterol/HDL Ration Measurement: --  Triglycerides, Serum: 121

## 2025-06-02 NOTE — BH INPATIENT PSYCHIATRY PROGRESS NOTE - NSBHCHARTREVIEWVS_PSY_A_CORE FT
Vital Signs Last 24 Hrs  T(C): 36.4 (06-02-25 @ 10:01), Max: 37 (06-01-25 @ 16:03)  T(F): 97.5 (06-02-25 @ 10:01), Max: 98.6 (06-01-25 @ 16:03)  HR: 96 (06-02-25 @ 10:01) (96 - 120)  BP: 145/81 (06-02-25 @ 10:01) (141/83 - 145/81)  BP(mean): --  RR: --  SpO2: --

## 2025-06-03 LAB — VALPROATE SERPL-MCNC: 54 UG/ML — SIGNIFICANT CHANGE UP (ref 50–100)

## 2025-06-03 PROCEDURE — 99232 SBSQ HOSP IP/OBS MODERATE 35: CPT | Mod: GC

## 2025-06-03 RX ADMIN — NICOTINE POLACRILEX 1 PATCH: 4 GUM, CHEWING ORAL at 09:46

## 2025-06-03 RX ADMIN — Medication 10 MILLIGRAM(S): at 08:16

## 2025-06-03 RX ADMIN — Medication 3 MILLIGRAM(S): at 08:16

## 2025-06-03 RX ADMIN — Medication 750 MILLIGRAM(S): at 19:26

## 2025-06-03 RX ADMIN — HYDROXYZINE HYDROCHLORIDE 50 MILLIGRAM(S): 25 TABLET, FILM COATED ORAL at 11:42

## 2025-06-03 RX ADMIN — HYDROXYZINE HYDROCHLORIDE 50 MILLIGRAM(S): 25 TABLET, FILM COATED ORAL at 06:35

## 2025-06-03 RX ADMIN — HYDROXYZINE HYDROCHLORIDE 50 MILLIGRAM(S): 25 TABLET, FILM COATED ORAL at 18:14

## 2025-06-03 RX ADMIN — Medication 3 MILLIGRAM(S): at 19:26

## 2025-06-03 NOTE — BH INPATIENT PSYCHIATRY PROGRESS NOTE - NSBHFUPINTERVALHXFT_PSY_A_CORE
Chart reviewed and report received from night team. Patient refused nighttime Risperdal last night. Patient seen in her room today. She is irritable, hostile, remains delusional, claiming that her psychiatric diagnoses were all wrong and that she does not need any medications. She thinks she only needs medications for sleep because she feels "hyper-energetic," but she doesn't want to take haldol or risperdal "because they are psych meds." When asked about her SI/HI/AVH, patient becomes irritated and yelled "why do you ask repetitively! do you think I'm retard!" She initially refuses to tell us her daughter's contact information, later agrees when told that contacting her daughter can facilitate her discharge. She still prefers us to contact her [facility] friend Sourav for information.    Attempted to contact daughter Sherrell at [phone], phone was hung up without a VM. Chart reviewed and report received from night team. Patient has been reluctantly taking prescribed medications. Patient seen in her room today. She is mildly irritable, remains with poor insight, claiming that her psychiatric diagnoses were all wrong and that she does not need any medications. She thinks she only needs medications for sleep because she feels "hyper-energetic," but she doesn't want to take haldol or risperdal "because they are psych meds." When asked about her SI/HI/AVH, patient becomes irritated and yelled "why do you ask repetitively! do you think I'm retard!" She initially refuses to tell us her daughter's contact information, later agrees when told that contacting her daughter can facilitate her discharge. She still prefers us to contact her [facility] friend Sourav for information.    Attempted to contact daughter Sherrell at [phone], phone was hung up without a VM.

## 2025-06-03 NOTE — BH INPATIENT PSYCHIATRY PROGRESS NOTE - CURRENT MEDICATION
MEDICATIONS  (STANDING):  cetirizine 10 milliGRAM(s) Oral daily  divalproex  milliGRAM(s) Oral at bedtime  haloperidol    Injectable 7.5 milliGRAM(s) IntraMuscular every 12 hours  risperiDONE   Tablet 3 milliGRAM(s) Oral every 12 hours    MEDICATIONS  (PRN):  acetaminophen     Tablet .. 650 milliGRAM(s) Oral every 6 hours PRN Mild Pain (1 - 3), Moderate Pain (4 - 6)  haloperidol     Tablet 5 milliGRAM(s) Oral every 6 hours PRN agitation  hydrOXYzine hydrochloride 50 milliGRAM(s) Oral every 6 hours PRN Anxiety  nicotine - 21 mG/24Hr(s) Patch 1 Patch Transdermal daily PRN tobacco craving

## 2025-06-03 NOTE — BH INPATIENT PSYCHIATRY PROGRESS NOTE - NSBHATTESTCOMMENTATTENDFT_PSY_A_CORE
Interviewed the patient with the resident Dr. Quinn and the team. Patient is slightly better related and less tangential, but still with very poor insight, reluctant to take medications, and focused on discharge. She is declining CORRALES because she "does not like the injections". Agree with the assessment and plan.

## 2025-06-03 NOTE — BH INPATIENT PSYCHIATRY PROGRESS NOTE - NSBHCHARTREVIEWVS_PSY_A_CORE FT
Vital Signs Last 24 Hrs  T(C): 36.6 (06-03-25 @ 09:21), Max: 36.6 (06-03-25 @ 09:21)  T(F): 97.8 (06-03-25 @ 09:21), Max: 97.8 (06-03-25 @ 09:21)  HR: 98 (06-03-25 @ 09:21) (90 - 98)  BP: 113/75 (06-03-25 @ 09:21) (112/69 - 113/75)  BP(mean): --  RR: --  SpO2: --
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
## 2025-06-03 NOTE — BH INPATIENT PSYCHIATRY PROGRESS NOTE - NSBHMETABOLIC_PSY_ALL_CORE_FT
BMI: BMI (kg/m2): 29.8 (05-08-25 @ 14:42)  HbA1c: A1C with Estimated Average Glucose Result: 5.8 % (04-25-25 @ 04:21)    Glucose: POCT Blood Glucose.: 150 mg/dL (05-03-25 @ 08:30)    BP: 113/75 (06-03-25 @ 09:21) (112/69 - 147/82)Vital Signs Last 24 Hrs  T(C): 36.6 (06-03-25 @ 09:21), Max: 36.6 (06-03-25 @ 09:21)  T(F): 97.8 (06-03-25 @ 09:21), Max: 97.8 (06-03-25 @ 09:21)  HR: 98 (06-03-25 @ 09:21) (90 - 98)  BP: 113/75 (06-03-25 @ 09:21) (112/69 - 113/75)  BP(mean): --  RR: --  SpO2: --      Lipid Panel: Date/Time: 05-21-25 @ 08:00  Cholesterol, Serum: 140  LDL Cholesterol Calculated: 64  HDL Cholesterol, Serum: 55  Total Cholesterol/HDL Ration Measurement: --  Triglycerides, Serum: 121

## 2025-06-04 PROCEDURE — 99232 SBSQ HOSP IP/OBS MODERATE 35: CPT | Mod: GC

## 2025-06-04 PROCEDURE — 99221 1ST HOSP IP/OBS SF/LOW 40: CPT | Mod: GC

## 2025-06-04 RX ADMIN — Medication 10 MILLIGRAM(S): at 09:41

## 2025-06-04 RX ADMIN — HYDROXYZINE HYDROCHLORIDE 50 MILLIGRAM(S): 25 TABLET, FILM COATED ORAL at 00:00

## 2025-06-04 RX ADMIN — Medication 3 MILLIGRAM(S): at 20:02

## 2025-06-04 RX ADMIN — NICOTINE POLACRILEX 1 PATCH: 4 GUM, CHEWING ORAL at 10:22

## 2025-06-04 RX ADMIN — HYDROXYZINE HYDROCHLORIDE 50 MILLIGRAM(S): 25 TABLET, FILM COATED ORAL at 06:52

## 2025-06-04 RX ADMIN — HYDROXYZINE HYDROCHLORIDE 50 MILLIGRAM(S): 25 TABLET, FILM COATED ORAL at 19:30

## 2025-06-04 RX ADMIN — Medication 3 MILLIGRAM(S): at 08:21

## 2025-06-04 RX ADMIN — Medication 750 MILLIGRAM(S): at 20:02
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
## 2025-06-04 NOTE — BH INPATIENT PSYCHIATRY PROGRESS NOTE - NSBHMSESPEECH_PSY_A_CORE
Normal volume, rate, productivity, spontaneity and articulation Secondary Intention Text (Leave Blank If You Do Not Want): The defect will heal by secondary intention.
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
## 2025-06-04 NOTE — BH INPATIENT PSYCHIATRY PROGRESS NOTE - NSBHCHARTREVIEWVS_PSY_A_CORE FT
Vital Signs Last 24 Hrs  T(C): 36.9 (06-04-25 @ 08:18), Max: 36.9 (06-04-25 @ 08:18)  T(F): 98.5 (06-04-25 @ 08:18), Max: 98.5 (06-04-25 @ 08:18)  HR: 99 (06-04-25 @ 08:18) (99 - 111)  BP: 121/74 (06-04-25 @ 08:18) (121/74 - 138/75)  BP(mean): --  RR: --  SpO2: --

## 2025-06-04 NOTE — BH INPATIENT PSYCHIATRY PROGRESS NOTE - NSBHATTESTCOMMENTATTENDFT_PSY_A_CORE
Observed and assessed the patient, discussed patient with the resident Dr. Quinn. Patient remains irritable, with no insight and poor judgement, although less intense and less bizarre, somewhat calmer, less intrusive. Agree with the assessment and plan.

## 2025-06-04 NOTE — BH INPATIENT PSYCHIATRY PROGRESS NOTE - NSBHMETABOLIC_PSY_ALL_CORE_FT
BMI: BMI (kg/m2): 29.8 (05-08-25 @ 14:42)  HbA1c: A1C with Estimated Average Glucose Result: 5.8 % (04-25-25 @ 04:21)    Glucose: POCT Blood Glucose.: 150 mg/dL (05-03-25 @ 08:30)    BP: 121/74 (06-04-25 @ 08:18) (112/69 - 145/81)Vital Signs Last 24 Hrs  T(C): 36.9 (06-04-25 @ 08:18), Max: 36.9 (06-04-25 @ 08:18)  T(F): 98.5 (06-04-25 @ 08:18), Max: 98.5 (06-04-25 @ 08:18)  HR: 99 (06-04-25 @ 08:18) (99 - 111)  BP: 121/74 (06-04-25 @ 08:18) (121/74 - 138/75)  BP(mean): --  RR: --  SpO2: --      Lipid Panel: Date/Time: 05-21-25 @ 08:00  Cholesterol, Serum: 140  LDL Cholesterol Calculated: 64  HDL Cholesterol, Serum: 55  Total Cholesterol/HDL Ration Measurement: --  Triglycerides, Serum: 121

## 2025-06-05 PROCEDURE — 99232 SBSQ HOSP IP/OBS MODERATE 35: CPT | Mod: GC

## 2025-06-05 RX ORDER — MELATONIN 5 MG
5 TABLET ORAL AT BEDTIME
Refills: 0 | Status: DISCONTINUED | OUTPATIENT
Start: 2025-06-05 | End: 2025-06-24

## 2025-06-05 RX ORDER — CLOTRIMAZOLE 1 G/100G
1 CREAM TOPICAL
Refills: 0 | Status: DISCONTINUED | OUTPATIENT
Start: 2025-06-05 | End: 2025-06-24

## 2025-06-05 RX ADMIN — Medication 3 MILLIGRAM(S): at 08:14

## 2025-06-05 RX ADMIN — HALOPERIDOL 5 MILLIGRAM(S): 10 TABLET ORAL at 21:47

## 2025-06-05 RX ADMIN — NICOTINE POLACRILEX 1 PATCH: 4 GUM, CHEWING ORAL at 06:29

## 2025-06-05 RX ADMIN — Medication 3 MILLIGRAM(S): at 19:47

## 2025-06-05 RX ADMIN — Medication 750 MILLIGRAM(S): at 19:47

## 2025-06-05 RX ADMIN — CLOTRIMAZOLE 1 APPLICATION(S): 1 CREAM TOPICAL at 19:45

## 2025-06-05 RX ADMIN — Medication 10 MILLIGRAM(S): at 08:14

## 2025-06-05 RX ADMIN — HYDROXYZINE HYDROCHLORIDE 50 MILLIGRAM(S): 25 TABLET, FILM COATED ORAL at 23:24

## 2025-06-05 RX ADMIN — NICOTINE POLACRILEX 1 PATCH: 4 GUM, CHEWING ORAL at 20:14

## 2025-06-05 RX ADMIN — Medication 5 MILLIGRAM(S): at 23:24

## 2025-06-05 RX ADMIN — HYDROXYZINE HYDROCHLORIDE 50 MILLIGRAM(S): 25 TABLET, FILM COATED ORAL at 14:51

## 2025-06-05 NOTE — BH INPATIENT PSYCHIATRY PROGRESS NOTE - NSBHATTESTCOMMENTATTENDFT_PSY_A_CORE
Interviewed the patient with the team, including the resident Dr Issa. Patient presents calmer, with more linear t/p, more able to maintain meaningful conversation, but still with poor insight and judgement.  Agree with the assessment and plan.

## 2025-06-05 NOTE — BH INPATIENT PSYCHIATRY PROGRESS NOTE - NSBHMETABOLIC_PSY_ALL_CORE_FT
BMI: BMI (kg/m2): 29.8 (05-08-25 @ 14:42)  HbA1c: A1C with Estimated Average Glucose Result: 5.8 % (04-25-25 @ 04:21)    Glucose: POCT Blood Glucose.: 150 mg/dL (05-03-25 @ 08:30)    BP: 131/71 (06-05-25 @ 10:57) (112/69 - 146/83)Vital Signs Last 24 Hrs  T(C): 36.7 (06-05-25 @ 10:57), Max: 37.1 (06-04-25 @ 15:46)  T(F): 98 (06-05-25 @ 10:57), Max: 98.7 (06-04-25 @ 15:46)  HR: 106 (06-05-25 @ 10:57) (76 - 106)  BP: 131/71 (06-05-25 @ 10:57) (131/71 - 146/83)  BP(mean): --  RR: --  SpO2: --      Lipid Panel: Date/Time: 05-21-25 @ 08:00  Cholesterol, Serum: 140  LDL Cholesterol Calculated: 64  HDL Cholesterol, Serum: 55  Total Cholesterol/HDL Ration Measurement: --  Triglycerides, Serum: 121
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
## 2025-06-05 NOTE — BH INPATIENT PSYCHIATRY PROGRESS NOTE - NSBHCHARTREVIEWVS_PSY_A_CORE FT
Vital Signs Last 24 Hrs  T(C): 36.7 (06-05-25 @ 10:57), Max: 37.1 (06-04-25 @ 15:46)  T(F): 98 (06-05-25 @ 10:57), Max: 98.7 (06-04-25 @ 15:46)  HR: 106 (06-05-25 @ 10:57) (76 - 106)  BP: 131/71 (06-05-25 @ 10:57) (131/71 - 146/83)  BP(mean): --  RR: --  SpO2: --

## 2025-06-05 NOTE — BH INPATIENT PSYCHIATRY PROGRESS NOTE - CURRENT MEDICATION
MEDICATIONS  (STANDING):  cetirizine 10 milliGRAM(s) Oral daily  clotrimazole 1% Cream 1 Application(s) Topical two times a day  divalproex  milliGRAM(s) Oral at bedtime  haloperidol    Injectable 7.5 milliGRAM(s) IntraMuscular every 12 hours  risperiDONE   Tablet 3 milliGRAM(s) Oral every 12 hours    MEDICATIONS  (PRN):  acetaminophen     Tablet .. 650 milliGRAM(s) Oral every 6 hours PRN Mild Pain (1 - 3), Moderate Pain (4 - 6)  haloperidol     Tablet 5 milliGRAM(s) Oral every 6 hours PRN agitation  hydrOXYzine hydrochloride 50 milliGRAM(s) Oral every 6 hours PRN Anxiety  nicotine - 21 mG/24Hr(s) Patch 1 Patch Transdermal daily PRN tobacco craving

## 2025-06-05 NOTE — BH INPATIENT PSYCHIATRY PROGRESS NOTE - NSBHFUPINTERVALHXFT_PSY_A_CORE
Chart reviewed and report received from night team. Patient has been compliant with medications although stated that she does not need them. She endorses good sleep and appetite, although very fixated on not needing medications. She believes that "all medications cause cancer" and asked if there were natural substances. Denies SI/HI/VH, does endorse sometimes hearing Sourav's voice. Towards the end of the interview when discussing medications, she said she would consider taking an CORRALES but does not "want to be tied down to any clinic."

## 2025-06-06 PROCEDURE — 99232 SBSQ HOSP IP/OBS MODERATE 35: CPT | Mod: GC

## 2025-06-06 RX ORDER — PALIPERIDONE 9 MG/1
156 TABLET, EXTENDED RELEASE ORAL ONCE
Refills: 0 | Status: COMPLETED | OUTPATIENT
Start: 2025-06-16 | End: 2025-06-16

## 2025-06-06 RX ORDER — PALIPERIDONE 9 MG/1
234 TABLET, EXTENDED RELEASE ORAL ONCE
Refills: 0 | Status: COMPLETED | OUTPATIENT
Start: 2025-06-09 | End: 2025-06-09

## 2025-06-06 RX ORDER — LIDOCAINE HYDROCHLORIDE 20 MG/ML
1 JELLY TOPICAL DAILY
Refills: 0 | Status: DISCONTINUED | OUTPATIENT
Start: 2025-06-06 | End: 2025-06-24

## 2025-06-06 RX ORDER — RISPERIDONE 4 MG
3 TABLET ORAL EVERY 12 HOURS
Refills: 0 | Status: COMPLETED | OUTPATIENT
Start: 2025-06-06 | End: 2025-06-08

## 2025-06-06 RX ADMIN — NICOTINE POLACRILEX 1 PATCH: 4 GUM, CHEWING ORAL at 10:55

## 2025-06-06 RX ADMIN — NICOTINE POLACRILEX 1 PATCH: 4 GUM, CHEWING ORAL at 20:10

## 2025-06-06 RX ADMIN — CLOTRIMAZOLE 1 APPLICATION(S): 1 CREAM TOPICAL at 20:51

## 2025-06-06 RX ADMIN — Medication 3 MILLIGRAM(S): at 20:50

## 2025-06-06 RX ADMIN — LIDOCAINE HYDROCHLORIDE 1 PATCH: 20 JELLY TOPICAL at 17:47

## 2025-06-06 RX ADMIN — Medication 5 MILLIGRAM(S): at 20:49

## 2025-06-06 RX ADMIN — CLOTRIMAZOLE 1 APPLICATION(S): 1 CREAM TOPICAL at 08:12

## 2025-06-06 RX ADMIN — Medication 10 MILLIGRAM(S): at 08:11

## 2025-06-06 RX ADMIN — NICOTINE POLACRILEX 1 PATCH: 4 GUM, CHEWING ORAL at 06:47

## 2025-06-06 RX ADMIN — Medication 750 MILLIGRAM(S): at 20:50

## 2025-06-06 RX ADMIN — LIDOCAINE HYDROCHLORIDE 1 PATCH: 20 JELLY TOPICAL at 20:09

## 2025-06-06 RX ADMIN — Medication 3 MILLIGRAM(S): at 08:12

## 2025-06-06 NOTE — BH INPATIENT PSYCHIATRY PROGRESS NOTE - NSBHMETABOLIC_PSY_ALL_CORE_FT
BMI: BMI (kg/m2): 29.8 (05-08-25 @ 14:42)  HbA1c: A1C with Estimated Average Glucose Result: 5.8 % (04-25-25 @ 04:21)    Glucose: POCT Blood Glucose.: 150 mg/dL (05-03-25 @ 08:30)    BP: 119/79 (06-06-25 @ 08:29) (119/79 - 146/83)Vital Signs Last 24 Hrs  T(C): 36.5 (06-06-25 @ 08:29), Max: 37.1 (06-05-25 @ 15:41)  T(F): 97.7 (06-06-25 @ 08:29), Max: 98.8 (06-05-25 @ 15:41)  HR: 111 (06-06-25 @ 08:29) (101 - 111)  BP: 119/79 (06-06-25 @ 08:29) (119/79 - 138/83)  BP(mean): --  RR: --  SpO2: --      Lipid Panel: Date/Time: 05-21-25 @ 08:00  Cholesterol, Serum: 140  LDL Cholesterol Calculated: 64  HDL Cholesterol, Serum: 55  Total Cholesterol/HDL Ration Measurement: --  Triglycerides, Serum: 121

## 2025-06-06 NOTE — BH INPATIENT PSYCHIATRY PROGRESS NOTE - CURRENT MEDICATION
MEDICATIONS  (STANDING):  cetirizine 10 milliGRAM(s) Oral daily  clotrimazole 1% Cream 1 Application(s) Topical two times a day  divalproex  milliGRAM(s) Oral at bedtime  haloperidol    Injectable 7.5 milliGRAM(s) IntraMuscular every 12 hours  melatonin. 5 milliGRAM(s) Oral at bedtime  risperiDONE   Tablet 3 milliGRAM(s) Oral every 12 hours    MEDICATIONS  (PRN):  acetaminophen     Tablet .. 650 milliGRAM(s) Oral every 6 hours PRN Mild Pain (1 - 3), Moderate Pain (4 - 6)  haloperidol     Tablet 5 milliGRAM(s) Oral every 6 hours PRN agitation  hydrOXYzine hydrochloride 50 milliGRAM(s) Oral every 6 hours PRN Anxiety  nicotine - 21 mG/24Hr(s) Patch 1 Patch Transdermal daily PRN tobacco craving

## 2025-06-06 NOTE — BH INPATIENT PSYCHIATRY PROGRESS NOTE - OTHER
Appears dishevelled  Oddly related superficially cooperative, overall guarded  Not formally assessed  paranoid, inappropriate laughters

## 2025-06-06 NOTE — BH INPATIENT PSYCHIATRY PROGRESS NOTE - NSBHATTESTCOMMENTATTENDFT_PSY_A_CORE
Interviewed the patient with the resident Dr. Quinn. Patient presents calmer, more able to maintain a meaningful conversation, and now agrees upon CORRALES. Plan to initiate CORRALES on 6/9. Agree with the assessment and plan.

## 2025-06-06 NOTE — BH INPATIENT PSYCHIATRY PROGRESS NOTE - NSBHFUPINTERVALHXFT_PSY_A_CORE
Chart reviewed and report received from night team. Patient has been compliant with medications. This morning patient proactively asks to talk to the team, presenting us with 2 pieces of paper towel on which she wrote down things she "wants to make sure you remember." She questions why we have not given her the CORRALES yet, stating that she wants to get the shot and leave. She wants the team to get in touch with a private practice psychiatrist Dr. Azar in [place] to establish her care, but then she says she's not sure if he's still alive, stating that "he's probably a corpse now." She endorses good mood and appetite. When the team leaves the room, patient comments "oh that's quick," and then she walks with the team into the hallway, accompanied with inappropriate laughters.
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
## 2025-06-06 NOTE — BH INPATIENT PSYCHIATRY PROGRESS NOTE - NSBHCHARTREVIEWVS_PSY_A_CORE FT
Vital Signs Last 24 Hrs  T(C): 36.5 (06-06-25 @ 08:29), Max: 37.1 (06-05-25 @ 15:41)  T(F): 97.7 (06-06-25 @ 08:29), Max: 98.8 (06-05-25 @ 15:41)  HR: 111 (06-06-25 @ 08:29) (101 - 111)  BP: 119/79 (06-06-25 @ 08:29) (119/79 - 138/83)  BP(mean): --  RR: --  SpO2: --

## 2025-06-07 RX ORDER — IBUPROFEN 200 MG
400 TABLET ORAL EVERY 8 HOURS
Refills: 0 | Status: DISCONTINUED | OUTPATIENT
Start: 2025-06-07 | End: 2025-06-24

## 2025-06-07 RX ADMIN — Medication 3 MILLIGRAM(S): at 20:06

## 2025-06-07 RX ADMIN — HYDROXYZINE HYDROCHLORIDE 50 MILLIGRAM(S): 25 TABLET, FILM COATED ORAL at 14:14

## 2025-06-07 RX ADMIN — Medication 3 MILLIGRAM(S): at 08:03

## 2025-06-07 RX ADMIN — NICOTINE POLACRILEX 1 PATCH: 4 GUM, CHEWING ORAL at 05:44

## 2025-06-07 RX ADMIN — Medication 10 MILLIGRAM(S): at 08:03

## 2025-06-07 RX ADMIN — LIDOCAINE HYDROCHLORIDE 1 PATCH: 20 JELLY TOPICAL at 06:47

## 2025-06-07 RX ADMIN — NICOTINE POLACRILEX 1 PATCH: 4 GUM, CHEWING ORAL at 06:47

## 2025-06-07 RX ADMIN — CLOTRIMAZOLE 1 APPLICATION(S): 1 CREAM TOPICAL at 20:07

## 2025-06-07 RX ADMIN — LIDOCAINE HYDROCHLORIDE 1 PATCH: 20 JELLY TOPICAL at 08:32

## 2025-06-07 RX ADMIN — Medication 750 MILLIGRAM(S): at 20:07

## 2025-06-07 RX ADMIN — CLOTRIMAZOLE 1 APPLICATION(S): 1 CREAM TOPICAL at 08:03

## 2025-06-07 RX ADMIN — HYDROXYZINE HYDROCHLORIDE 50 MILLIGRAM(S): 25 TABLET, FILM COATED ORAL at 05:41

## 2025-06-07 RX ADMIN — Medication 5 MILLIGRAM(S): at 20:07

## 2025-06-08 RX ADMIN — Medication 5 MILLIGRAM(S): at 19:36

## 2025-06-08 RX ADMIN — HYDROXYZINE HYDROCHLORIDE 50 MILLIGRAM(S): 25 TABLET, FILM COATED ORAL at 05:52

## 2025-06-08 RX ADMIN — NICOTINE POLACRILEX 1 PATCH: 4 GUM, CHEWING ORAL at 06:42

## 2025-06-08 RX ADMIN — Medication 3 MILLIGRAM(S): at 19:36

## 2025-06-08 RX ADMIN — Medication 400 MILLIGRAM(S): at 19:36

## 2025-06-08 RX ADMIN — Medication 10 MILLIGRAM(S): at 08:10

## 2025-06-08 RX ADMIN — Medication 750 MILLIGRAM(S): at 19:36

## 2025-06-08 RX ADMIN — Medication 3 MILLIGRAM(S): at 08:09

## 2025-06-09 PROCEDURE — 99232 SBSQ HOSP IP/OBS MODERATE 35: CPT | Mod: GC

## 2025-06-09 RX ORDER — POLYETHYLENE GLYCOL 3350 17 G/17G
17 POWDER, FOR SOLUTION ORAL ONCE
Refills: 0 | Status: COMPLETED | OUTPATIENT
Start: 2025-06-09 | End: 2025-06-09

## 2025-06-09 RX ORDER — BISACODYL 5 MG
5 TABLET, DELAYED RELEASE (ENTERIC COATED) ORAL AT BEDTIME
Refills: 0 | Status: DISCONTINUED | OUTPATIENT
Start: 2025-06-09 | End: 2025-06-24

## 2025-06-09 RX ORDER — DIPHENHYDRAMINE HCL 12.5MG/5ML
50 ELIXIR ORAL ONCE
Refills: 0 | Status: COMPLETED | OUTPATIENT
Start: 2025-06-09 | End: 2025-06-09

## 2025-06-09 RX ADMIN — PALIPERIDONE 234 MILLIGRAM(S): 9 TABLET, EXTENDED RELEASE ORAL at 11:07

## 2025-06-09 RX ADMIN — NICOTINE POLACRILEX 1 PATCH: 4 GUM, CHEWING ORAL at 08:29

## 2025-06-09 RX ADMIN — HYDROXYZINE HYDROCHLORIDE 50 MILLIGRAM(S): 25 TABLET, FILM COATED ORAL at 13:17

## 2025-06-09 RX ADMIN — LIDOCAINE HYDROCHLORIDE 1 PATCH: 20 JELLY TOPICAL at 08:32

## 2025-06-09 RX ADMIN — Medication 750 MILLIGRAM(S): at 20:13

## 2025-06-09 RX ADMIN — HYDROXYZINE HYDROCHLORIDE 50 MILLIGRAM(S): 25 TABLET, FILM COATED ORAL at 20:13

## 2025-06-09 RX ADMIN — Medication 400 MILLIGRAM(S): at 14:29

## 2025-06-09 RX ADMIN — CLOTRIMAZOLE 1 APPLICATION(S): 1 CREAM TOPICAL at 08:29

## 2025-06-09 RX ADMIN — Medication 400 MILLIGRAM(S): at 13:40

## 2025-06-09 RX ADMIN — Medication 50 MILLIGRAM(S): at 18:21

## 2025-06-09 RX ADMIN — CLOTRIMAZOLE 1 APPLICATION(S): 1 CREAM TOPICAL at 20:13

## 2025-06-09 RX ADMIN — POLYETHYLENE GLYCOL 3350 17 GRAM(S): 17 POWDER, FOR SOLUTION ORAL at 20:13

## 2025-06-09 RX ADMIN — Medication 10 MILLIGRAM(S): at 08:28

## 2025-06-09 RX ADMIN — Medication 5 MILLIGRAM(S): at 20:13
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
## 2025-06-09 NOTE — BH INPATIENT PSYCHIATRY PROGRESS NOTE - NSBHCHARTREVIEWVS_PSY_A_CORE FT
Vital Signs Last 24 Hrs  T(C): 36.4 (06-09-25 @ 08:00), Max: 36.6 (06-08-25 @ 16:22)  T(F): 97.6 (06-09-25 @ 08:00), Max: 97.9 (06-08-25 @ 16:22)  HR: 106 (06-09-25 @ 08:00) (106 - 111)  BP: 110/74 (06-09-25 @ 08:00) (110/74 - 129/80)  BP(mean): --  RR: --  SpO2: --

## 2025-06-09 NOTE — BH INPATIENT PSYCHIATRY PROGRESS NOTE - NSBHMETABOLIC_PSY_ALL_CORE_FT
BMI: BMI (kg/m2): 29.8 (05-08-25 @ 14:42)  HbA1c: A1C with Estimated Average Glucose Result: 5.8 % (04-25-25 @ 04:21)    Glucose: POCT Blood Glucose.: 150 mg/dL (05-03-25 @ 08:30)    BP: 110/74 (06-09-25 @ 08:00) (110/74 - 145/90)Vital Signs Last 24 Hrs  T(C): 36.4 (06-09-25 @ 08:00), Max: 36.6 (06-08-25 @ 16:22)  T(F): 97.6 (06-09-25 @ 08:00), Max: 97.9 (06-08-25 @ 16:22)  HR: 106 (06-09-25 @ 08:00) (106 - 111)  BP: 110/74 (06-09-25 @ 08:00) (110/74 - 129/80)  BP(mean): --  RR: --  SpO2: --      Lipid Panel: Date/Time: 05-21-25 @ 08:00  Cholesterol, Serum: 140  LDL Cholesterol Calculated: 64  HDL Cholesterol, Serum: 55  Total Cholesterol/HDL Ration Measurement: --  Triglycerides, Serum: 121

## 2025-06-09 NOTE — BH INPATIENT PSYCHIATRY PROGRESS NOTE - CURRENT MEDICATION
MEDICATIONS  (STANDING):  cetirizine 10 milliGRAM(s) Oral daily  clotrimazole 1% Cream 1 Application(s) Topical two times a day  divalproex  milliGRAM(s) Oral at bedtime  haloperidol    Injectable 7.5 milliGRAM(s) IntraMuscular every 12 hours  lidocaine   4% Patch 1 Patch Transdermal daily  melatonin. 5 milliGRAM(s) Oral at bedtime    MEDICATIONS  (PRN):  acetaminophen     Tablet .. 650 milliGRAM(s) Oral every 6 hours PRN Mild Pain (1 - 3), Moderate Pain (4 - 6)  haloperidol     Tablet 5 milliGRAM(s) Oral every 6 hours PRN agitation  hydrOXYzine hydrochloride 50 milliGRAM(s) Oral every 6 hours PRN Anxiety  ibuprofen  Tablet. 400 milliGRAM(s) Oral every 8 hours PRN Moderate Pain (4 - 6)  nicotine - 21 mG/24Hr(s) Patch 1 Patch Transdermal daily PRN tobacco craving

## 2025-06-09 NOTE — BH INPATIENT PSYCHIATRY PROGRESS NOTE - OTHER
Oddly related Not formally assessed  Appears dishevelled  superficially cooperative, overall guarded
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
## 2025-06-10 PROCEDURE — 99232 SBSQ HOSP IP/OBS MODERATE 35: CPT | Mod: GC

## 2025-06-10 RX ORDER — MAGNESIUM HYDROXIDE 400 MG/5ML
30 SUSPENSION ORAL DAILY
Refills: 0 | Status: DISCONTINUED | OUTPATIENT
Start: 2025-06-10 | End: 2025-06-24

## 2025-06-10 RX ADMIN — Medication 10 MILLIGRAM(S): at 09:05

## 2025-06-10 RX ADMIN — LIDOCAINE HYDROCHLORIDE 1 PATCH: 20 JELLY TOPICAL at 09:06

## 2025-06-10 RX ADMIN — CLOTRIMAZOLE 1 APPLICATION(S): 1 CREAM TOPICAL at 21:02

## 2025-06-10 RX ADMIN — Medication 750 MILLIGRAM(S): at 20:59

## 2025-06-10 RX ADMIN — Medication 5 MILLIGRAM(S): at 20:59

## 2025-06-10 RX ADMIN — CLOTRIMAZOLE 1 APPLICATION(S): 1 CREAM TOPICAL at 09:07

## 2025-06-10 RX ADMIN — NICOTINE POLACRILEX 1 PATCH: 4 GUM, CHEWING ORAL at 07:36

## 2025-06-10 RX ADMIN — MAGNESIUM HYDROXIDE 30 MILLILITER(S): 400 SUSPENSION ORAL at 02:34

## 2025-06-10 RX ADMIN — Medication 400 MILLIGRAM(S): at 17:25

## 2025-06-10 RX ADMIN — HYDROXYZINE HYDROCHLORIDE 50 MILLIGRAM(S): 25 TABLET, FILM COATED ORAL at 23:10

## 2025-06-10 RX ADMIN — HYDROXYZINE HYDROCHLORIDE 50 MILLIGRAM(S): 25 TABLET, FILM COATED ORAL at 13:40

## 2025-06-10 RX ADMIN — NICOTINE POLACRILEX 1 PATCH: 4 GUM, CHEWING ORAL at 07:22

## 2025-06-10 RX ADMIN — Medication 400 MILLIGRAM(S): at 00:12

## 2025-06-10 RX ADMIN — LIDOCAINE HYDROCHLORIDE 1 PATCH: 20 JELLY TOPICAL at 21:50

## 2025-06-10 RX ADMIN — Medication 400 MILLIGRAM(S): at 00:15

## 2025-06-10 RX ADMIN — Medication 400 MILLIGRAM(S): at 13:37

## 2025-06-10 NOTE — BH INPATIENT PSYCHIATRY PROGRESS NOTE - OTHER
superficially cooperative, overall guarded  Not formally assessed  Oddly related Appears dishevelled

## 2025-06-10 NOTE — BH INPATIENT PSYCHIATRY PROGRESS NOTE - CURRENT MEDICATION
MEDICATIONS  (STANDING):  bisacodyl 5 milliGRAM(s) Oral at bedtime  cetirizine 10 milliGRAM(s) Oral daily  clotrimazole 1% Cream 1 Application(s) Topical two times a day  divalproex  milliGRAM(s) Oral at bedtime  haloperidol    Injectable 7.5 milliGRAM(s) IntraMuscular every 12 hours  lidocaine   4% Patch 1 Patch Transdermal daily  melatonin. 5 milliGRAM(s) Oral at bedtime    MEDICATIONS  (PRN):  acetaminophen     Tablet .. 650 milliGRAM(s) Oral every 6 hours PRN Mild Pain (1 - 3), Moderate Pain (4 - 6)  haloperidol     Tablet 5 milliGRAM(s) Oral every 6 hours PRN agitation  hydrOXYzine hydrochloride 50 milliGRAM(s) Oral every 6 hours PRN Anxiety  ibuprofen  Tablet. 400 milliGRAM(s) Oral every 8 hours PRN Moderate Pain (4 - 6)  magnesium hydroxide Suspension 30 milliLiter(s) Oral daily PRN Constipation  nicotine - 21 mG/24Hr(s) Patch 1 Patch Transdermal daily PRN tobacco craving   MEDICATIONS  (STANDING):  bisacodyl 5 milliGRAM(s) Oral at bedtime  cetirizine 10 milliGRAM(s) Oral daily  clotrimazole 1% Cream 1 Application(s) Topical two times a day  divalproex  milliGRAM(s) Oral at bedtime  lidocaine   4% Patch 1 Patch Transdermal daily  melatonin. 5 milliGRAM(s) Oral at bedtime    MEDICATIONS  (PRN):  acetaminophen     Tablet .. 650 milliGRAM(s) Oral every 6 hours PRN Mild Pain (1 - 3), Moderate Pain (4 - 6)  haloperidol     Tablet 5 milliGRAM(s) Oral every 6 hours PRN agitation  hydrOXYzine hydrochloride 50 milliGRAM(s) Oral every 6 hours PRN Anxiety  ibuprofen  Tablet. 400 milliGRAM(s) Oral every 8 hours PRN Moderate Pain (4 - 6)  magnesium hydroxide Suspension 30 milliLiter(s) Oral daily PRN Constipation  nicotine - 21 mG/24Hr(s) Patch 1 Patch Transdermal daily PRN tobacco craving

## 2025-06-10 NOTE — BH INPATIENT PSYCHIATRY PROGRESS NOTE - NSBHATTESTCOMMENTATTENDFT_PSY_A_CORE
Interviewed patient with the team including the resident Dr. Quinn and the team. Patient presents pleasant, calm, better (although still oddly) related, easily engaged in conversation. She still has limited insight. Agree with the assessment and plan.
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
## 2025-06-10 NOTE — BH INPATIENT PSYCHIATRY PROGRESS NOTE - NSBHCHARTREVIEWVS_PSY_A_CORE FT
Vital Signs Last 24 Hrs  T(C): 37.2 (06-10-25 @ 08:00), Max: 37.2 (06-10-25 @ 08:00)  T(F): 98.9 (06-10-25 @ 08:00), Max: 98.9 (06-10-25 @ 08:00)  HR: 107 (06-10-25 @ 08:00) (107 - 123)  BP: 125/72 (06-10-25 @ 08:00) (121/79 - 125/72)  BP(mean): --  RR: --  SpO2: --

## 2025-06-10 NOTE — BH INPATIENT PSYCHIATRY PROGRESS NOTE - NSBHMETABOLIC_PSY_ALL_CORE_FT
BMI: BMI (kg/m2): 29.8 (05-08-25 @ 14:42)  HbA1c: A1C with Estimated Average Glucose Result: 5.8 % (04-25-25 @ 04:21)    Glucose: POCT Blood Glucose.: 150 mg/dL (05-03-25 @ 08:30)    BP: 125/72 (06-10-25 @ 08:00) (110/74 - 129/80)Vital Signs Last 24 Hrs  T(C): 37.2 (06-10-25 @ 08:00), Max: 37.2 (06-10-25 @ 08:00)  T(F): 98.9 (06-10-25 @ 08:00), Max: 98.9 (06-10-25 @ 08:00)  HR: 107 (06-10-25 @ 08:00) (107 - 123)  BP: 125/72 (06-10-25 @ 08:00) (121/79 - 125/72)  BP(mean): --  RR: --  SpO2: --      Lipid Panel: Date/Time: 05-21-25 @ 08:00  Cholesterol, Serum: 140  LDL Cholesterol Calculated: 64  HDL Cholesterol, Serum: 55  Total Cholesterol/HDL Ration Measurement: --  Triglycerides, Serum: 121

## 2025-06-11 PROCEDURE — 99232 SBSQ HOSP IP/OBS MODERATE 35: CPT | Mod: GC

## 2025-06-11 RX ORDER — LANOLIN/MINERAL OIL/PETROLATUM
1 OINTMENT (GRAM) OPHTHALMIC (EYE) EVERY 4 HOURS
Refills: 0 | Status: DISCONTINUED | OUTPATIENT
Start: 2025-06-11 | End: 2025-06-24

## 2025-06-11 RX ADMIN — Medication 400 MILLIGRAM(S): at 12:27

## 2025-06-11 RX ADMIN — Medication 5 MILLIGRAM(S): at 20:16

## 2025-06-11 RX ADMIN — HYDROXYZINE HYDROCHLORIDE 50 MILLIGRAM(S): 25 TABLET, FILM COATED ORAL at 22:03

## 2025-06-11 RX ADMIN — Medication 10 MILLIGRAM(S): at 08:44

## 2025-06-11 RX ADMIN — Medication 1 DROP(S): at 20:18

## 2025-06-11 RX ADMIN — Medication 750 MILLIGRAM(S): at 20:17

## 2025-06-11 RX ADMIN — CLOTRIMAZOLE 1 APPLICATION(S): 1 CREAM TOPICAL at 20:17

## 2025-06-11 NOTE — BH INPATIENT PSYCHIATRY PROGRESS NOTE - NSBHCHARTREVIEWVS_PSY_A_CORE FT
Vital Signs Last 24 Hrs  T(C): 36.9 (06-11-25 @ 08:45), Max: 37 (06-10-25 @ 16:13)  T(F): 98.5 (06-11-25 @ 08:45), Max: 98.6 (06-10-25 @ 16:13)  HR: 101 (06-11-25 @ 08:45) (91 - 101)  BP: 106/71 (06-11-25 @ 08:45) (106/71 - 133/82)  BP(mean): --  RR: --  SpO2: --

## 2025-06-11 NOTE — BH INPATIENT PSYCHIATRY PROGRESS NOTE - CURRENT MEDICATION
MEDICATIONS  (STANDING):  bisacodyl 5 milliGRAM(s) Oral at bedtime  cetirizine 10 milliGRAM(s) Oral daily  clotrimazole 1% Cream 1 Application(s) Topical two times a day  divalproex  milliGRAM(s) Oral at bedtime  lidocaine   4% Patch 1 Patch Transdermal daily  melatonin. 5 milliGRAM(s) Oral at bedtime    MEDICATIONS  (PRN):  acetaminophen     Tablet .. 650 milliGRAM(s) Oral every 6 hours PRN Mild Pain (1 - 3), Moderate Pain (4 - 6)  haloperidol     Tablet 5 milliGRAM(s) Oral every 6 hours PRN agitation  hydrOXYzine hydrochloride 50 milliGRAM(s) Oral every 6 hours PRN Anxiety  ibuprofen  Tablet. 400 milliGRAM(s) Oral every 8 hours PRN Moderate Pain (4 - 6)  magnesium hydroxide Suspension 30 milliLiter(s) Oral daily PRN Constipation  nicotine - 21 mG/24Hr(s) Patch 1 Patch Transdermal daily PRN tobacco craving

## 2025-06-11 NOTE — BH INPATIENT PSYCHIATRY PROGRESS NOTE - OTHER
Not formally assessed  initially euthymic, then irritable at end Oddly related Appears dishevelled  superficially cooperative, overall guarded

## 2025-06-11 NOTE — BH INPATIENT PSYCHIATRY PROGRESS NOTE - NSBHMETABOLIC_PSY_ALL_CORE_FT
BMI: BMI (kg/m2): 29.8 (05-08-25 @ 14:42)  HbA1c: A1C with Estimated Average Glucose Result: 5.8 % (04-25-25 @ 04:21)    Glucose: POCT Blood Glucose.: 150 mg/dL (05-03-25 @ 08:30)    BP: 106/71 (06-11-25 @ 08:45) (106/71 - 133/82)Vital Signs Last 24 Hrs  T(C): 36.9 (06-11-25 @ 08:45), Max: 37 (06-10-25 @ 16:13)  T(F): 98.5 (06-11-25 @ 08:45), Max: 98.6 (06-10-25 @ 16:13)  HR: 101 (06-11-25 @ 08:45) (91 - 101)  BP: 106/71 (06-11-25 @ 08:45) (106/71 - 133/82)  BP(mean): --  RR: --  SpO2: --      Lipid Panel: Date/Time: 05-21-25 @ 08:00  Cholesterol, Serum: 140  LDL Cholesterol Calculated: 64  HDL Cholesterol, Serum: 55  Total Cholesterol/HDL Ration Measurement: --  Triglycerides, Serum: 121
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
## 2025-06-12 PROCEDURE — 99232 SBSQ HOSP IP/OBS MODERATE 35: CPT | Mod: GC

## 2025-06-12 RX ADMIN — Medication 5 MILLIGRAM(S): at 20:07

## 2025-06-12 RX ADMIN — HYDROXYZINE HYDROCHLORIDE 50 MILLIGRAM(S): 25 TABLET, FILM COATED ORAL at 14:02

## 2025-06-12 RX ADMIN — NICOTINE POLACRILEX 1 PATCH: 4 GUM, CHEWING ORAL at 06:18

## 2025-06-12 RX ADMIN — Medication 10 MILLIGRAM(S): at 08:10

## 2025-06-12 RX ADMIN — NICOTINE POLACRILEX 1 PATCH: 4 GUM, CHEWING ORAL at 05:49

## 2025-06-12 RX ADMIN — NICOTINE POLACRILEX 1 PATCH: 4 GUM, CHEWING ORAL at 18:48

## 2025-06-12 RX ADMIN — Medication 750 MILLIGRAM(S): at 20:07
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
## 2025-06-12 NOTE — BH INPATIENT PSYCHIATRY PROGRESS NOTE - NSBHCHARTREVIEWVS_PSY_A_CORE FT
Vital Signs Last 24 Hrs  T(C): 36.7 (06-11-25 @ 16:08), Max: 36.7 (06-11-25 @ 16:08)  T(F): 98.1 (06-11-25 @ 16:08), Max: 98.1 (06-11-25 @ 16:08)  HR: 93 (06-11-25 @ 16:08) (93 - 93)  BP: 126/70 (06-11-25 @ 16:08) (126/70 - 126/70)  BP(mean): --  RR: --  SpO2: --     Vital Signs Last 24 Hrs  T(C): 36.6 (06-12-25 @ 10:16), Max: 36.7 (06-11-25 @ 16:08)  T(F): 97.9 (06-12-25 @ 10:16), Max: 98.1 (06-11-25 @ 16:08)  HR: 105 (06-12-25 @ 10:16) (93 - 105)  BP: 166/81 (06-12-25 @ 10:16) (126/70 - 166/81)  BP(mean): --  RR: --  SpO2: --

## 2025-06-12 NOTE — BH INPATIENT PSYCHIATRY PROGRESS NOTE - NSBHMETABOLIC_PSY_ALL_CORE_FT
BMI: BMI (kg/m2): 29.8 (05-08-25 @ 14:42)  HbA1c: A1C with Estimated Average Glucose Result: 5.8 % (04-25-25 @ 04:21)    Glucose: POCT Blood Glucose.: 150 mg/dL (05-03-25 @ 08:30)    BP: 126/70 (06-11-25 @ 16:08) (106/71 - 133/82)Vital Signs Last 24 Hrs  T(C): 36.7 (06-11-25 @ 16:08), Max: 36.7 (06-11-25 @ 16:08)  T(F): 98.1 (06-11-25 @ 16:08), Max: 98.1 (06-11-25 @ 16:08)  HR: 93 (06-11-25 @ 16:08) (93 - 93)  BP: 126/70 (06-11-25 @ 16:08) (126/70 - 126/70)  BP(mean): --  RR: --  SpO2: --      Lipid Panel: Date/Time: 05-21-25 @ 08:00  Cholesterol, Serum: 140  LDL Cholesterol Calculated: 64  HDL Cholesterol, Serum: 55  Total Cholesterol/HDL Ration Measurement: --  Triglycerides, Serum: 121   BMI: BMI (kg/m2): 29.8 (05-08-25 @ 14:42)  HbA1c: A1C with Estimated Average Glucose Result: 5.8 % (04-25-25 @ 04:21)    Glucose: POCT Blood Glucose.: 150 mg/dL (05-03-25 @ 08:30)    BP: 166/81 (06-12-25 @ 10:16) (106/71 - 166/81)Vital Signs Last 24 Hrs  T(C): 36.6 (06-12-25 @ 10:16), Max: 36.7 (06-11-25 @ 16:08)  T(F): 97.9 (06-12-25 @ 10:16), Max: 98.1 (06-11-25 @ 16:08)  HR: 105 (06-12-25 @ 10:16) (93 - 105)  BP: 166/81 (06-12-25 @ 10:16) (126/70 - 166/81)  BP(mean): --  RR: --  SpO2: --      Lipid Panel: Date/Time: 05-21-25 @ 08:00  Cholesterol, Serum: 140  LDL Cholesterol Calculated: 64  HDL Cholesterol, Serum: 55  Total Cholesterol/HDL Ration Measurement: --  Triglycerides, Serum: 121
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
## 2025-06-13 PROCEDURE — 99232 SBSQ HOSP IP/OBS MODERATE 35: CPT | Mod: GC

## 2025-06-13 RX ORDER — QUETIAPINE FUMARATE 25 MG/1
25 TABLET ORAL ONCE
Refills: 0 | Status: COMPLETED | OUTPATIENT
Start: 2025-06-13 | End: 2025-06-13

## 2025-06-13 RX ADMIN — Medication 1 DROP(S): at 08:05

## 2025-06-13 RX ADMIN — Medication 750 MILLIGRAM(S): at 20:12

## 2025-06-13 RX ADMIN — HYDROXYZINE HYDROCHLORIDE 50 MILLIGRAM(S): 25 TABLET, FILM COATED ORAL at 09:34

## 2025-06-13 RX ADMIN — Medication 10 MILLIGRAM(S): at 08:05

## 2025-06-13 RX ADMIN — HYDROXYZINE HYDROCHLORIDE 50 MILLIGRAM(S): 25 TABLET, FILM COATED ORAL at 23:30

## 2025-06-13 RX ADMIN — NICOTINE POLACRILEX 1 PATCH: 4 GUM, CHEWING ORAL at 07:24

## 2025-06-13 RX ADMIN — HYDROXYZINE HYDROCHLORIDE 50 MILLIGRAM(S): 25 TABLET, FILM COATED ORAL at 00:19

## 2025-06-13 RX ADMIN — Medication 5 MILLIGRAM(S): at 20:12

## 2025-06-13 RX ADMIN — NICOTINE POLACRILEX 1 PATCH: 4 GUM, CHEWING ORAL at 20:18

## 2025-06-13 NOTE — BH TREATMENT PLAN - NSTXSUBMISINTERRN_PSY_ALL_CORE
Monitor for signs and symptoms of ETOH W/D.  Encouragement of cessation of substance abuse.  Will provide a safe detox with medications

## 2025-06-13 NOTE — BH INPATIENT PSYCHIATRY PROGRESS NOTE - NSBHMETABOLIC_PSY_ALL_CORE_FT
BMI: BMI (kg/m2): 29.8 (05-08-25 @ 14:42)  HbA1c: A1C with Estimated Average Glucose Result: 5.8 % (04-25-25 @ 04:21)    Glucose: POCT Blood Glucose.: 150 mg/dL (05-03-25 @ 08:30)    BP: 109/73 (06-13-25 @ 05:53) (106/71 - 167/93)Vital Signs Last 24 Hrs  T(C): 36.8 (06-12-25 @ 16:25), Max: 36.8 (06-12-25 @ 16:25)  T(F): 98.2 (06-12-25 @ 16:25), Max: 98.2 (06-12-25 @ 16:25)  HR: 88 (06-13-25 @ 05:53) (88 - 88)  BP: 109/73 (06-13-25 @ 05:53) (109/73 - 167/93)  BP(mean): --  RR: --  SpO2: --      Lipid Panel: Date/Time: 05-21-25 @ 08:00  Cholesterol, Serum: 140  LDL Cholesterol Calculated: 64  HDL Cholesterol, Serum: 55  Total Cholesterol/HDL Ration Measurement: --  Triglycerides, Serum: 121

## 2025-06-13 NOTE — BH INPATIENT PSYCHIATRY PROGRESS NOTE - NSBHCHARTREVIEWVS_PSY_A_CORE FT
Vital Signs Last 24 Hrs  T(C): 36.8 (06-12-25 @ 16:25), Max: 36.8 (06-12-25 @ 16:25)  T(F): 98.2 (06-12-25 @ 16:25), Max: 98.2 (06-12-25 @ 16:25)  HR: 88 (06-13-25 @ 05:53) (88 - 88)  BP: 109/73 (06-13-25 @ 05:53) (109/73 - 167/93)  BP(mean): --  RR: --  SpO2: --

## 2025-06-13 NOTE — BH INPATIENT PSYCHIATRY PROGRESS NOTE - CURRENT MEDICATION
MEDICATIONS  (STANDING):  bisacodyl 5 milliGRAM(s) Oral at bedtime  cetirizine 10 milliGRAM(s) Oral daily  clotrimazole 1% Cream 1 Application(s) Topical two times a day  divalproex  milliGRAM(s) Oral at bedtime  lidocaine   4% Patch 1 Patch Transdermal daily  melatonin. 5 milliGRAM(s) Oral at bedtime    MEDICATIONS  (PRN):  acetaminophen     Tablet .. 650 milliGRAM(s) Oral every 6 hours PRN Mild Pain (1 - 3), Moderate Pain (4 - 6)  artificial  tears Solution 1 Drop(s) Both EYES every 4 hours PRN dry eyes  haloperidol     Tablet 5 milliGRAM(s) Oral every 6 hours PRN agitation  hydrOXYzine hydrochloride 50 milliGRAM(s) Oral every 6 hours PRN Anxiety  ibuprofen  Tablet. 400 milliGRAM(s) Oral every 8 hours PRN Moderate Pain (4 - 6)  magnesium hydroxide Suspension 30 milliLiter(s) Oral daily PRN Constipation  nicotine - 21 mG/24Hr(s) Patch 1 Patch Transdermal daily PRN tobacco craving

## 2025-06-13 NOTE — BH TREATMENT PLAN - NSCMSPTSTRENGTHS_PSY_ALL_CORE
Unable to obtain (specify)
Self confidence/Self-reliant
Self confidence/Self-reliant
Unable to obtain (specify)

## 2025-06-13 NOTE — BH INPATIENT PSYCHIATRY PROGRESS NOTE - NSBHATTESTCOMMENTATTENDFT_PSY_A_CORE
Interviewed the patient with the resident Dr. Quinn. Patient is calmer, but remains delusional with poor insight and no understanding why she needs treatment. She would benefit from AOT referral. Agree with the assessment and plan.
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
## 2025-06-14 RX ADMIN — CLOTRIMAZOLE 1 APPLICATION(S): 1 CREAM TOPICAL at 08:36

## 2025-06-14 RX ADMIN — NICOTINE POLACRILEX 1 PATCH: 4 GUM, CHEWING ORAL at 06:55

## 2025-06-14 RX ADMIN — Medication 400 MILLIGRAM(S): at 21:15

## 2025-06-14 RX ADMIN — NICOTINE POLACRILEX 1 PATCH: 4 GUM, CHEWING ORAL at 08:32

## 2025-06-14 RX ADMIN — Medication 750 MILLIGRAM(S): at 20:02

## 2025-06-14 RX ADMIN — HYDROXYZINE HYDROCHLORIDE 50 MILLIGRAM(S): 25 TABLET, FILM COATED ORAL at 14:22

## 2025-06-14 RX ADMIN — LIDOCAINE HYDROCHLORIDE 1 PATCH: 20 JELLY TOPICAL at 20:06

## 2025-06-14 RX ADMIN — LIDOCAINE HYDROCHLORIDE 1 PATCH: 20 JELLY TOPICAL at 08:34

## 2025-06-14 RX ADMIN — Medication 400 MILLIGRAM(S): at 20:03

## 2025-06-14 RX ADMIN — Medication 5 MILLIGRAM(S): at 20:03

## 2025-06-14 RX ADMIN — HYDROXYZINE HYDROCHLORIDE 50 MILLIGRAM(S): 25 TABLET, FILM COATED ORAL at 07:56

## 2025-06-14 RX ADMIN — Medication 10 MILLIGRAM(S): at 08:33

## 2025-06-14 RX ADMIN — CLOTRIMAZOLE 1 APPLICATION(S): 1 CREAM TOPICAL at 20:05

## 2025-06-15 RX ADMIN — NICOTINE POLACRILEX 1 PATCH: 4 GUM, CHEWING ORAL at 06:45

## 2025-06-15 RX ADMIN — HYDROXYZINE HYDROCHLORIDE 50 MILLIGRAM(S): 25 TABLET, FILM COATED ORAL at 14:27

## 2025-06-15 RX ADMIN — CLOTRIMAZOLE 1 APPLICATION(S): 1 CREAM TOPICAL at 21:02

## 2025-06-15 RX ADMIN — Medication 5 MILLIGRAM(S): at 21:02

## 2025-06-15 RX ADMIN — HYDROXYZINE HYDROCHLORIDE 50 MILLIGRAM(S): 25 TABLET, FILM COATED ORAL at 06:44

## 2025-06-15 RX ADMIN — Medication 400 MILLIGRAM(S): at 14:21

## 2025-06-15 RX ADMIN — HYDROXYZINE HYDROCHLORIDE 50 MILLIGRAM(S): 25 TABLET, FILM COATED ORAL at 23:09

## 2025-06-15 RX ADMIN — CLOTRIMAZOLE 1 APPLICATION(S): 1 CREAM TOPICAL at 12:21

## 2025-06-15 RX ADMIN — Medication 750 MILLIGRAM(S): at 21:02

## 2025-06-15 RX ADMIN — Medication 10 MILLIGRAM(S): at 09:00

## 2025-06-15 RX ADMIN — Medication 400 MILLIGRAM(S): at 13:51

## 2025-06-16 PROCEDURE — 99232 SBSQ HOSP IP/OBS MODERATE 35: CPT | Mod: GC

## 2025-06-16 RX ADMIN — Medication 10 MILLIGRAM(S): at 08:31

## 2025-06-16 RX ADMIN — CLOTRIMAZOLE 1 APPLICATION(S): 1 CREAM TOPICAL at 20:12

## 2025-06-16 RX ADMIN — Medication 750 MILLIGRAM(S): at 20:11

## 2025-06-16 RX ADMIN — NICOTINE POLACRILEX 1 PATCH: 4 GUM, CHEWING ORAL at 09:49

## 2025-06-16 RX ADMIN — HYDROXYZINE HYDROCHLORIDE 50 MILLIGRAM(S): 25 TABLET, FILM COATED ORAL at 06:18

## 2025-06-16 RX ADMIN — PALIPERIDONE 156 MILLIGRAM(S): 9 TABLET, EXTENDED RELEASE ORAL at 09:55

## 2025-06-16 RX ADMIN — Medication 5 MILLIGRAM(S): at 20:11

## 2025-06-16 RX ADMIN — HYDROXYZINE HYDROCHLORIDE 50 MILLIGRAM(S): 25 TABLET, FILM COATED ORAL at 15:29

## 2025-06-16 NOTE — BH INPATIENT PSYCHIATRY PROGRESS NOTE - NSBHFUPINTERVALHXFT_PSY_A_CORE
Chart reviewed and report received from night team. Patient received PRN Atarax yesterday PM.      On approach, patient is in the day room, agrees to walk back to her room with writer. She questions why she had to have another injection, and it was explained the first 2 doses of the CORRALES are 1 week apart, and then it is monthly. She was guarded, oddly-related, and irritable. She believes people are sneaking into her room but she does not know who, and she does not feel safe in the hospital. She knows she can talk to staff if other patients are making her feel uncomfortable but she "doesn't like to tell on people." She endorses sleeping well but says she has been gaining weight from the Depakote. She refuses to monitor her diet. She denies SI/HI/AVH.     She was initially agreeable to signing a shelter application, because she has no where else to go. She said she wouldn't feel safe in a shelter due to the other people there. She said she would consider following up with her outpatient health appointments, and agreed that seeing her outpatient providers regularly would be a good way to ensure her safety while living in a shelter. At the end of the interview the patient demanded that she go to a shelter in [place], and when informed that we couldn't guarantee the location of the shelter, she called us lazy and told us she would live with her daughter (even though we told her that her daughter will not welcome her in her home).      Told patient about the plan of applying for AOT while patient is here, patient does not seem agreeable and claims that we're "playing cards" against her, and continues to insist that "my case was already closed."

## 2025-06-16 NOTE — BH INPATIENT PSYCHIATRY PROGRESS NOTE - NSBHATTESTCOMMENTATTENDFT_PSY_A_CORE
Interviewed the patient with the resident Dr. Issa. As per nursing, patient has been "visible on  the unit, watching TV, socializing with some peers". She reluctantly accepted the second Invega injection. She remains with persecutory thoughts, stating "I feel a lot of anxiety here b/c of sh..t going on, people are coming to my room. I think it is all hospital set up". agree with the assessment and plan.

## 2025-06-16 NOTE — BH INPATIENT PSYCHIATRY PROGRESS NOTE - NSBHCHARTREVIEWVS_PSY_A_CORE FT
Vital Signs Last 24 Hrs  T(C): 37.2 (06-15-25 @ 15:54), Max: 37.2 (06-15-25 @ 15:54)  T(F): 99 (06-15-25 @ 15:54), Max: 99 (06-15-25 @ 15:54)  HR: 105 (06-16-25 @ 08:32) (87 - 105)  BP: 153/84 (06-16-25 @ 08:32) (139/84 - 153/84)  BP(mean): --  RR: --  SpO2: --
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
## 2025-06-16 NOTE — BH INPATIENT PSYCHIATRY PROGRESS NOTE - NSBHMETABOLIC_PSY_ALL_CORE_FT
BMI: BMI (kg/m2): 29.8 (05-08-25 @ 14:42)  HbA1c: A1C with Estimated Average Glucose Result: 5.8 % (04-25-25 @ 04:21)    Glucose: POCT Blood Glucose.: 150 mg/dL (05-03-25 @ 08:30)    BP: 153/84 (06-16-25 @ 08:32) (129/86 - 158/89)Vital Signs Last 24 Hrs  T(C): 37.2 (06-15-25 @ 15:54), Max: 37.2 (06-15-25 @ 15:54)  T(F): 99 (06-15-25 @ 15:54), Max: 99 (06-15-25 @ 15:54)  HR: 105 (06-16-25 @ 08:32) (87 - 105)  BP: 153/84 (06-16-25 @ 08:32) (139/84 - 153/84)  BP(mean): --  RR: --  SpO2: --      Lipid Panel: Date/Time: 05-21-25 @ 08:00  Cholesterol, Serum: 140  LDL Cholesterol Calculated: 64  HDL Cholesterol, Serum: 55  Total Cholesterol/HDL Ration Measurement: --  Triglycerides, Serum: 121

## 2025-06-17 PROCEDURE — 99232 SBSQ HOSP IP/OBS MODERATE 35: CPT | Mod: GC

## 2025-06-17 RX ORDER — LOPERAMIDE HCL 1 MG/7.5ML
2 SOLUTION ORAL EVERY 6 HOURS
Refills: 0 | Status: DISCONTINUED | OUTPATIENT
Start: 2025-06-17 | End: 2025-06-24

## 2025-06-17 RX ADMIN — Medication 750 MILLIGRAM(S): at 20:29

## 2025-06-17 RX ADMIN — HYDROXYZINE HYDROCHLORIDE 50 MILLIGRAM(S): 25 TABLET, FILM COATED ORAL at 16:32

## 2025-06-17 RX ADMIN — NICOTINE POLACRILEX 1 PATCH: 4 GUM, CHEWING ORAL at 19:03

## 2025-06-17 RX ADMIN — HYDROXYZINE HYDROCHLORIDE 50 MILLIGRAM(S): 25 TABLET, FILM COATED ORAL at 10:04

## 2025-06-17 RX ADMIN — Medication 400 MILLIGRAM(S): at 12:56

## 2025-06-17 RX ADMIN — HYDROXYZINE HYDROCHLORIDE 50 MILLIGRAM(S): 25 TABLET, FILM COATED ORAL at 22:32

## 2025-06-17 RX ADMIN — NICOTINE POLACRILEX 1 PATCH: 4 GUM, CHEWING ORAL at 08:02

## 2025-06-17 RX ADMIN — CLOTRIMAZOLE 1 APPLICATION(S): 1 CREAM TOPICAL at 20:28

## 2025-06-17 RX ADMIN — Medication 5 MILLIGRAM(S): at 20:26

## 2025-06-17 RX ADMIN — Medication 10 MILLIGRAM(S): at 08:01
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
## 2025-06-17 NOTE — BH INPATIENT PSYCHIATRY PROGRESS NOTE - NSBHATTESTCOMMENTATTENDFT_PSY_A_CORE
Interviewed the patient with the resident Dr. Issa. Patient presents mildly irritable with limited insight. Asks to d/c VPA b/c she has "loose stool and does not want to become fat". Agree with the assessment and plan.

## 2025-06-17 NOTE — BH INPATIENT PSYCHIATRY PROGRESS NOTE - NSBHMETABOLIC_PSY_ALL_CORE_FT
BMI: BMI (kg/m2): 29.8 (05-08-25 @ 14:42)  HbA1c: A1C with Estimated Average Glucose Result: 5.8 % (04-25-25 @ 04:21)    Glucose: POCT Blood Glucose.: 150 mg/dL (05-03-25 @ 08:30)    BP: 138/83 (06-17-25 @ 08:38) (129/86 - 172/82)Vital Signs Last 24 Hrs  T(C): 36.7 (06-17-25 @ 08:38), Max: 36.7 (06-16-25 @ 16:12)  T(F): 98 (06-17-25 @ 08:38), Max: 98 (06-16-25 @ 16:12)  HR: 93 (06-17-25 @ 08:38) (93 - 108)  BP: 138/83 (06-17-25 @ 08:38) (138/83 - 172/82)  BP(mean): --  RR: --  SpO2: --      Lipid Panel: Date/Time: 05-21-25 @ 08:00  Cholesterol, Serum: 140  LDL Cholesterol Calculated: 64  HDL Cholesterol, Serum: 55  Total Cholesterol/HDL Ration Measurement: --  Triglycerides, Serum: 121
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
## 2025-06-18 PROCEDURE — 99232 SBSQ HOSP IP/OBS MODERATE 35: CPT | Mod: GC

## 2025-06-18 RX ADMIN — NICOTINE POLACRILEX 1 PATCH: 4 GUM, CHEWING ORAL at 08:13

## 2025-06-18 RX ADMIN — HYDROXYZINE HYDROCHLORIDE 50 MILLIGRAM(S): 25 TABLET, FILM COATED ORAL at 07:17

## 2025-06-18 RX ADMIN — Medication 5 MILLIGRAM(S): at 20:33

## 2025-06-18 RX ADMIN — Medication 650 MILLIGRAM(S): at 19:48

## 2025-06-18 RX ADMIN — CLOTRIMAZOLE 1 APPLICATION(S): 1 CREAM TOPICAL at 20:33

## 2025-06-18 RX ADMIN — Medication 400 MILLIGRAM(S): at 09:00

## 2025-06-18 RX ADMIN — HYDROXYZINE HYDROCHLORIDE 50 MILLIGRAM(S): 25 TABLET, FILM COATED ORAL at 20:33

## 2025-06-18 RX ADMIN — HYDROXYZINE HYDROCHLORIDE 50 MILLIGRAM(S): 25 TABLET, FILM COATED ORAL at 14:03

## 2025-06-18 RX ADMIN — CLOTRIMAZOLE 1 APPLICATION(S): 1 CREAM TOPICAL at 08:10

## 2025-06-18 RX ADMIN — Medication 750 MILLIGRAM(S): at 20:33

## 2025-06-18 RX ADMIN — Medication 400 MILLIGRAM(S): at 08:10

## 2025-06-18 RX ADMIN — Medication 10 MILLIGRAM(S): at 08:12
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
## 2025-06-18 NOTE — BH INPATIENT PSYCHIATRY PROGRESS NOTE - NSBHATTESTCOMMENTATTENDFT_PSY_A_CORE
Interviewed the patient with the resident Dr. Issa. Patient remains slightly irritable at times but generally cooperative. Denies si/hi/ah/vh. Presents somatic c/o SOB but speaks in loud voice in full sentences. Agree with the assessment and plan.

## 2025-06-18 NOTE — BH INPATIENT PSYCHIATRY PROGRESS NOTE - NSBHMETABOLIC_PSY_ALL_CORE_FT
BMI: BMI (kg/m2): 29.8 (05-08-25 @ 14:42)  HbA1c: A1C with Estimated Average Glucose Result: 5.8 % (04-25-25 @ 04:21)    Glucose: POCT Blood Glucose.: 150 mg/dL (05-03-25 @ 08:30)    BP: 137/83 (06-18-25 @ 07:30) (137/83 - 172/82)Vital Signs Last 24 Hrs  T(C): 36.9 (06-18-25 @ 07:30), Max: 36.9 (06-18-25 @ 07:30)  T(F): 98.4 (06-18-25 @ 07:30), Max: 98.4 (06-18-25 @ 07:30)  HR: 132 (06-18-25 @ 07:30) (86 - 132)  BP: 137/83 (06-18-25 @ 07:30) (137/83 - 146/89)  BP(mean): --  RR: --  SpO2: --      Lipid Panel: Date/Time: 05-21-25 @ 08:00  Cholesterol, Serum: 140  LDL Cholesterol Calculated: 64  HDL Cholesterol, Serum: 55  Total Cholesterol/HDL Ration Measurement: --  Triglycerides, Serum: 121   BMI: BMI (kg/m2): 29.8 (05-08-25 @ 14:42)  HbA1c: A1C with Estimated Average Glucose Result: 5.8 % (04-25-25 @ 04:21)    Glucose: POCT Blood Glucose.: 150 mg/dL (05-03-25 @ 08:30)    BP: 117/75 (06-18-25 @ 16:09) (117/75 - 172/82)Vital Signs Last 24 Hrs  T(C): 36.8 (06-18-25 @ 16:09), Max: 36.9 (06-18-25 @ 07:30)  T(F): 98.2 (06-18-25 @ 16:09), Max: 98.4 (06-18-25 @ 07:30)  HR: 91 (06-18-25 @ 16:09) (91 - 132)  BP: 117/75 (06-18-25 @ 16:09) (117/75 - 137/83)  BP(mean): --  RR: --  SpO2: --      Lipid Panel: Date/Time: 05-21-25 @ 08:00  Cholesterol, Serum: 140  LDL Cholesterol Calculated: 64  HDL Cholesterol, Serum: 55  Total Cholesterol/HDL Ration Measurement: --  Triglycerides, Serum: 121

## 2025-06-18 NOTE — BH INPATIENT PSYCHIATRY PROGRESS NOTE - CURRENT MEDICATION
MEDICATIONS  (STANDING):  bisacodyl 5 milliGRAM(s) Oral at bedtime  cetirizine 10 milliGRAM(s) Oral daily  clotrimazole 1% Cream 1 Application(s) Topical two times a day  divalproex  milliGRAM(s) Oral at bedtime  lidocaine   4% Patch 1 Patch Transdermal daily  melatonin. 5 milliGRAM(s) Oral at bedtime    MEDICATIONS  (PRN):  acetaminophen     Tablet .. 650 milliGRAM(s) Oral every 6 hours PRN Mild Pain (1 - 3), Moderate Pain (4 - 6)  artificial  tears Solution 1 Drop(s) Both EYES every 4 hours PRN dry eyes  haloperidol     Tablet 5 milliGRAM(s) Oral every 6 hours PRN agitation  hydrOXYzine hydrochloride 50 milliGRAM(s) Oral every 6 hours PRN Anxiety  ibuprofen  Tablet. 400 milliGRAM(s) Oral every 8 hours PRN Moderate Pain (4 - 6)  loperamide 2 milliGRAM(s) Oral every 6 hours PRN loose stools  magnesium hydroxide Suspension 30 milliLiter(s) Oral daily PRN Constipation  nicotine - 21 mG/24Hr(s) Patch 1 Patch Transdermal daily PRN tobacco craving

## 2025-06-18 NOTE — BH INPATIENT PSYCHIATRY PROGRESS NOTE - NSBHFUPINTERVALHXFT_PSY_A_CORE
Chart reviewed and report received from night team. Patient received PRN Atarax yesterday PM.      On approach, patient is in the day room getting coffee, agrees to walk back to her room with writer. She says she feels pretty good, endorses good sleep and appetite. Still endorses loose stools and aware that there are PRNs available to help. Denies SI/HI/AVH. Reports some SOB while dancing at [employer] yesterday and reports mild cough with phlegm yesterday 4:30pm. Denies chest pain or wheezing.

## 2025-06-18 NOTE — BH INPATIENT PSYCHIATRY PROGRESS NOTE - NSBHCHARTREVIEWVS_PSY_A_CORE FT
Vital Signs Last 24 Hrs  T(C): 36.9 (06-18-25 @ 07:30), Max: 36.9 (06-18-25 @ 07:30)  T(F): 98.4 (06-18-25 @ 07:30), Max: 98.4 (06-18-25 @ 07:30)  HR: 132 (06-18-25 @ 07:30) (86 - 132)  BP: 137/83 (06-18-25 @ 07:30) (137/83 - 146/89)  BP(mean): --  RR: --  SpO2: --     Vital Signs Last 24 Hrs  T(C): 36.8 (06-18-25 @ 16:09), Max: 36.9 (06-18-25 @ 07:30)  T(F): 98.2 (06-18-25 @ 16:09), Max: 98.4 (06-18-25 @ 07:30)  HR: 91 (06-18-25 @ 16:09) (91 - 132)  BP: 117/75 (06-18-25 @ 16:09) (117/75 - 137/83)  BP(mean): --  RR: --  SpO2: --

## 2025-06-19 PROCEDURE — 99232 SBSQ HOSP IP/OBS MODERATE 35: CPT

## 2025-06-19 RX ORDER — DEXTROMETHORPHAN HBR, GUAIFENESIN 20; 200 MG/10ML; MG/10ML
10 SOLUTION ORAL THREE TIMES A DAY
Refills: 0 | Status: DISCONTINUED | OUTPATIENT
Start: 2025-06-19 | End: 2025-06-24

## 2025-06-19 RX ADMIN — NICOTINE POLACRILEX 1 PATCH: 4 GUM, CHEWING ORAL at 06:05

## 2025-06-19 RX ADMIN — Medication 5 MILLIGRAM(S): at 20:23

## 2025-06-19 RX ADMIN — Medication 750 MILLIGRAM(S): at 20:23

## 2025-06-19 RX ADMIN — DEXTROMETHORPHAN HBR, GUAIFENESIN 10 MILLILITER(S): 20; 200 SOLUTION ORAL at 13:01

## 2025-06-19 RX ADMIN — Medication 400 MILLIGRAM(S): at 08:54

## 2025-06-19 RX ADMIN — Medication 400 MILLIGRAM(S): at 18:00

## 2025-06-19 RX ADMIN — LIDOCAINE HYDROCHLORIDE 1 PATCH: 20 JELLY TOPICAL at 08:54

## 2025-06-19 RX ADMIN — Medication 10 MILLIGRAM(S): at 08:54

## 2025-06-19 RX ADMIN — HYDROXYZINE HYDROCHLORIDE 50 MILLIGRAM(S): 25 TABLET, FILM COATED ORAL at 18:00

## 2025-06-19 RX ADMIN — LIDOCAINE HYDROCHLORIDE 1 PATCH: 20 JELLY TOPICAL at 23:15

## 2025-06-19 RX ADMIN — CLOTRIMAZOLE 1 APPLICATION(S): 1 CREAM TOPICAL at 08:54

## 2025-06-19 RX ADMIN — HYDROXYZINE HYDROCHLORIDE 50 MILLIGRAM(S): 25 TABLET, FILM COATED ORAL at 06:03
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
## 2025-06-19 NOTE — BH INPATIENT PSYCHIATRY PROGRESS NOTE - NSBHMETABOLIC_PSY_ALL_CORE_FT
BMI: BMI (kg/m2): 29.8 (05-08-25 @ 14:42)  HbA1c: A1C with Estimated Average Glucose Result: 5.8 % (04-25-25 @ 04:21)    Glucose: POCT Blood Glucose.: 150 mg/dL (05-03-25 @ 08:30)    BP: 149/85 (06-19-25 @ 08:18) (117/75 - 172/82)Vital Signs Last 24 Hrs  T(C): 36.6 (06-19-25 @ 08:18), Max: 36.8 (06-18-25 @ 16:09)  T(F): 97.8 (06-19-25 @ 08:18), Max: 98.2 (06-18-25 @ 16:09)  HR: 106 (06-19-25 @ 08:18) (91 - 106)  BP: 149/85 (06-19-25 @ 08:18) (117/75 - 149/85)  BP(mean): --  RR: --  SpO2: --      Lipid Panel: Date/Time: 05-21-25 @ 08:00  Cholesterol, Serum: 140  LDL Cholesterol Calculated: 64  HDL Cholesterol, Serum: 55  Total Cholesterol/HDL Ration Measurement: --  Triglycerides, Serum: 121

## 2025-06-19 NOTE — BH INPATIENT PSYCHIATRY PROGRESS NOTE - NSBHCHARTREVIEWVS_PSY_A_CORE FT
Vital Signs Last 24 Hrs  T(C): 36.6 (06-19-25 @ 08:18), Max: 36.8 (06-18-25 @ 16:09)  T(F): 97.8 (06-19-25 @ 08:18), Max: 98.2 (06-18-25 @ 16:09)  HR: 106 (06-19-25 @ 08:18) (91 - 106)  BP: 149/85 (06-19-25 @ 08:18) (117/75 - 149/85)  BP(mean): --  RR: --  SpO2: --
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
## 2025-06-19 NOTE — CHART NOTE - NSCHARTNOTEFT_GEN_A_CORE
Called to evaluate patient for cough. She is admitted to [facility] with schizoaffective d/o.    Patient reports cough comes and goes for an unknown amount of time. She smokes and vapes. She states she wants "oxygen therapy and a non-steroidal". After discussion with staff, patient has been preoccupied with getting oxygen after a different patient was admitted on O2.    Patient refused lung exam. She was observed to be ambulating about the unit without difficulty and was reported to scream at people without any visible respiratory distress.     At this time in the presence of normal vital signs and absence of other findings, will recommend PRN cough suppressant and PRN advil and outpatient pulmonary follow up due to smoking hx. Please recall if other concerns arise.

## 2025-06-20 PROCEDURE — 99232 SBSQ HOSP IP/OBS MODERATE 35: CPT | Mod: GC

## 2025-06-20 RX ADMIN — Medication 400 MILLIGRAM(S): at 10:15

## 2025-06-20 RX ADMIN — NICOTINE POLACRILEX 1 PATCH: 4 GUM, CHEWING ORAL at 20:55

## 2025-06-20 RX ADMIN — LIDOCAINE HYDROCHLORIDE 1 PATCH: 20 JELLY TOPICAL at 09:03

## 2025-06-20 RX ADMIN — NICOTINE POLACRILEX 1 PATCH: 4 GUM, CHEWING ORAL at 07:20

## 2025-06-20 RX ADMIN — HYDROXYZINE HYDROCHLORIDE 50 MILLIGRAM(S): 25 TABLET, FILM COATED ORAL at 06:31

## 2025-06-20 RX ADMIN — HYDROXYZINE HYDROCHLORIDE 50 MILLIGRAM(S): 25 TABLET, FILM COATED ORAL at 00:30

## 2025-06-20 RX ADMIN — Medication 5 MILLIGRAM(S): at 20:46

## 2025-06-20 RX ADMIN — Medication 10 MILLIGRAM(S): at 09:03

## 2025-06-20 RX ADMIN — Medication 400 MILLIGRAM(S): at 11:20

## 2025-06-20 RX ADMIN — HYDROXYZINE HYDROCHLORIDE 50 MILLIGRAM(S): 25 TABLET, FILM COATED ORAL at 15:32

## 2025-06-20 RX ADMIN — Medication 400 MILLIGRAM(S): at 20:57

## 2025-06-20 RX ADMIN — Medication 400 MILLIGRAM(S): at 20:00

## 2025-06-20 RX ADMIN — NICOTINE POLACRILEX 1 PATCH: 4 GUM, CHEWING ORAL at 06:18

## 2025-06-20 RX ADMIN — LOPERAMIDE HCL 2 MILLIGRAM(S): 1 SOLUTION ORAL at 13:12

## 2025-06-20 RX ADMIN — CLOTRIMAZOLE 1 APPLICATION(S): 1 CREAM TOPICAL at 09:03

## 2025-06-20 RX ADMIN — NICOTINE POLACRILEX 1 PATCH: 4 GUM, CHEWING ORAL at 07:28

## 2025-06-20 RX ADMIN — Medication 400 MILLIGRAM(S): at 21:27

## 2025-06-20 NOTE — BH TREATMENT PLAN - NSTXPLANSTARTDATE_PSY_ALL_CORE
13-Jun-2025 10:49
06-Jun-2025 10:30
30-May-2025
16-May-2025
23-May-2025
09-May-2025
20-Jun-2025 16:10
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
## 2025-06-20 NOTE — BH TREATMENT PLAN - NSTXPSYCHOGOAL_PSY_ALL_CORE
Will identify 2 coping skills that help mitigate hallucinations
Will identify 2 coping skills that assist with focus on reality
Will identify 2 coping skills that help mitigate hallucinations

## 2025-06-20 NOTE — BH TREATMENT PLAN - NSTXCAREGIVERAGREEMENT_PSY_P_CORE
Patient does not have family/caregiver involved in care
Yes
Yes
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
## 2025-06-20 NOTE — BH TREATMENT PLAN - NSBHPRIMARYDX_PSY_ALL_CORE
Schizoaffective disorder    

## 2025-06-20 NOTE — BH INPATIENT PSYCHIATRY PROGRESS NOTE - NSBHCHARTREVIEWVS_PSY_A_CORE FT
Vital Signs Last 24 Hrs  T(C): 36.7 (06-20-25 @ 08:36), Max: 36.9 (06-19-25 @ 17:45)  T(F): 98 (06-20-25 @ 08:36), Max: 98.4 (06-19-25 @ 17:45)  HR: 102 (06-20-25 @ 08:36) (87 - 102)  BP: 139/83 (06-20-25 @ 08:36) (139/83 - 147/81)  BP(mean): --  RR: --  SpO2: --

## 2025-06-20 NOTE — BH TREATMENT PLAN - NSTXPSYCHOINTERRN_PSY_ALL_CORE
RN will assess for command auditory hallucination  RN will provide daily medication regimen  RN will offer PRN medication for worsening hallucinations  RN will ensure the enviroment is safe  RN will educate on coping skills/ encourage distractions to help manage auditory hallucination

## 2025-06-20 NOTE — BH INPATIENT PSYCHIATRY PROGRESS NOTE - CURRENT MEDICATION
MEDICATIONS  (STANDING):  bisacodyl 5 milliGRAM(s) Oral at bedtime  cetirizine 10 milliGRAM(s) Oral daily  clotrimazole 1% Cream 1 Application(s) Topical two times a day  divalproex  milliGRAM(s) Oral at bedtime  lidocaine   4% Patch 1 Patch Transdermal daily  melatonin. 5 milliGRAM(s) Oral at bedtime    MEDICATIONS  (PRN):  acetaminophen     Tablet .. 650 milliGRAM(s) Oral every 6 hours PRN Mild Pain (1 - 3), Moderate Pain (4 - 6)  artificial  tears Solution 1 Drop(s) Both EYES every 4 hours PRN dry eyes  guaifenesin/dextromethorphan Oral Liquid 10 milliLiter(s) Oral three times a day PRN cough  haloperidol     Tablet 5 milliGRAM(s) Oral every 6 hours PRN agitation  hydrOXYzine hydrochloride 50 milliGRAM(s) Oral every 6 hours PRN Anxiety  ibuprofen  Tablet. 400 milliGRAM(s) Oral every 8 hours PRN Moderate Pain (4 - 6)  loperamide 2 milliGRAM(s) Oral every 6 hours PRN loose stools  magnesium hydroxide Suspension 30 milliLiter(s) Oral daily PRN Constipation  nicotine - 21 mG/24Hr(s) Patch 1 Patch Transdermal daily PRN tobacco craving

## 2025-06-20 NOTE — BH INPATIENT PSYCHIATRY PROGRESS NOTE - NSBHMETABOLIC_PSY_ALL_CORE_FT
BMI: BMI (kg/m2): 29.8 (05-08-25 @ 14:42)  HbA1c: A1C with Estimated Average Glucose Result: 5.8 % (04-25-25 @ 04:21)    Glucose: POCT Blood Glucose.: 150 mg/dL (05-03-25 @ 08:30)    BP: 139/83 (06-20-25 @ 08:36) (117/75 - 149/85)Vital Signs Last 24 Hrs  T(C): 36.7 (06-20-25 @ 08:36), Max: 36.9 (06-19-25 @ 17:45)  T(F): 98 (06-20-25 @ 08:36), Max: 98.4 (06-19-25 @ 17:45)  HR: 102 (06-20-25 @ 08:36) (87 - 102)  BP: 139/83 (06-20-25 @ 08:36) (139/83 - 147/81)  BP(mean): --  RR: --  SpO2: --      Lipid Panel: Date/Time: 05-21-25 @ 08:00  Cholesterol, Serum: 140  LDL Cholesterol Calculated: 64  HDL Cholesterol, Serum: 55  Total Cholesterol/HDL Ration Measurement: --  Triglycerides, Serum: 121

## 2025-06-20 NOTE — BH TREATMENT PLAN - NSTXPSYCHOINTERPR_PSY_ALL_CORE
KS will offer ongoing support and encouragement to attend RT sessions
GA will offer ongoing support and provide encouragement as needed
VT will offer ongoing support and encouragement to attend RT sessions
LA will offer ongoing support and provide encouragment as needed
DE will offer ongoing support and encouragement to attend RT sessions
LA will offer support and provide encouragement to attend RT sessions as needed
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
## 2025-06-20 NOTE — BH TREATMENT PLAN - NSTXCAREGIVERPARTICIPATE_PSY_P_CORE
No, patient unwilling to involve family/caregiver
Family/Caregiver participated in identification of needs/problems/goals for treatment
No, patient unwilling to involve family/caregiver
No, patient unwilling to involve family/caregiver
Family/Caregiver participated in identification of needs/problems/goals for treatment
No, patient unwilling to involve family/caregiver

## 2025-06-20 NOTE — BH TREATMENT PLAN - NSTXPATIENTPARTICIPATE_PSY_ALL_CORE
No, patient unwilling to participate
Patient participated in identification of needs/problems/goals for treatment
Patient participated in identification of needs/problems/goals for treatment
No, patient unwilling to participate

## 2025-06-20 NOTE — BH TREATMENT PLAN - NSDCCRITERIA_PSY_ALL_CORE
When patient is able to function appropriately 

## 2025-06-20 NOTE — BH TREATMENT PLAN - NSTXPSYCHOINTERSW_PSY_ALL_CORE
SW will continue to provide support, contact, education and referrals for healthy coping skills and be able to name and utilize coping skills. SW will encourage patients to be visible on the unit and participate in groups.
SW will continue to provide support, contact, education and referrals for healthy coping skills and be able to name and utilize coping skills. SW will encourage patients to be visible on the unit and participate in groups.
SW will continue to provide support contacts, education and referrals for healthy coping skills and be able to name and utilize coping skills. SW will encourage patients to be visible on the unit and participate in groups.
SW will continue to provide support, contact, education and referrals for healthy coping skills and be able to name and utilize coping skills. SW will encourage patients to be visible on the unit and participate in groups.

## 2025-06-21 RX ADMIN — NICOTINE POLACRILEX 1 PATCH: 4 GUM, CHEWING ORAL at 08:02

## 2025-06-21 RX ADMIN — Medication 10 MILLIGRAM(S): at 07:19

## 2025-06-21 RX ADMIN — LIDOCAINE HYDROCHLORIDE 1 PATCH: 20 JELLY TOPICAL at 07:19

## 2025-06-21 RX ADMIN — Medication 400 MILLIGRAM(S): at 11:37

## 2025-06-21 RX ADMIN — CLOTRIMAZOLE 1 APPLICATION(S): 1 CREAM TOPICAL at 07:21

## 2025-06-21 RX ADMIN — HYDROXYZINE HYDROCHLORIDE 50 MILLIGRAM(S): 25 TABLET, FILM COATED ORAL at 06:36

## 2025-06-21 RX ADMIN — CLOTRIMAZOLE 1 APPLICATION(S): 1 CREAM TOPICAL at 20:25

## 2025-06-21 RX ADMIN — DEXTROMETHORPHAN HBR, GUAIFENESIN 10 MILLILITER(S): 20; 200 SOLUTION ORAL at 20:27

## 2025-06-21 RX ADMIN — NICOTINE POLACRILEX 1 PATCH: 4 GUM, CHEWING ORAL at 07:18

## 2025-06-21 RX ADMIN — NICOTINE POLACRILEX 1 PATCH: 4 GUM, CHEWING ORAL at 21:02

## 2025-06-21 RX ADMIN — Medication 400 MILLIGRAM(S): at 12:06

## 2025-06-21 RX ADMIN — Medication 5 MILLIGRAM(S): at 20:26

## 2025-06-21 RX ADMIN — HYDROXYZINE HYDROCHLORIDE 50 MILLIGRAM(S): 25 TABLET, FILM COATED ORAL at 20:27

## 2025-06-22 PROCEDURE — 99231 SBSQ HOSP IP/OBS SF/LOW 25: CPT | Mod: GC

## 2025-06-22 RX ADMIN — NICOTINE POLACRILEX 1 PATCH: 4 GUM, CHEWING ORAL at 07:32

## 2025-06-22 RX ADMIN — HYDROXYZINE HYDROCHLORIDE 50 MILLIGRAM(S): 25 TABLET, FILM COATED ORAL at 18:36

## 2025-06-22 RX ADMIN — Medication 10 MILLIGRAM(S): at 07:35

## 2025-06-22 RX ADMIN — CLOTRIMAZOLE 1 APPLICATION(S): 1 CREAM TOPICAL at 19:52

## 2025-06-22 RX ADMIN — HYDROXYZINE HYDROCHLORIDE 50 MILLIGRAM(S): 25 TABLET, FILM COATED ORAL at 06:56

## 2025-06-22 RX ADMIN — NICOTINE POLACRILEX 1 PATCH: 4 GUM, CHEWING ORAL at 06:11

## 2025-06-22 RX ADMIN — Medication 5 MILLIGRAM(S): at 19:52

## 2025-06-22 RX ADMIN — NICOTINE POLACRILEX 1 PATCH: 4 GUM, CHEWING ORAL at 07:35
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
## 2025-06-23 PROCEDURE — 99232 SBSQ HOSP IP/OBS MODERATE 35: CPT | Mod: GC

## 2025-06-23 RX ADMIN — Medication 10 MILLIGRAM(S): at 09:00

## 2025-06-23 RX ADMIN — NICOTINE POLACRILEX 1 PATCH: 4 GUM, CHEWING ORAL at 08:10

## 2025-06-23 RX ADMIN — NICOTINE POLACRILEX 1 PATCH: 4 GUM, CHEWING ORAL at 09:39

## 2025-06-23 RX ADMIN — NICOTINE POLACRILEX 1 PATCH: 4 GUM, CHEWING ORAL at 18:09

## 2025-06-23 RX ADMIN — HYDROXYZINE HYDROCHLORIDE 50 MILLIGRAM(S): 25 TABLET, FILM COATED ORAL at 02:40

## 2025-06-23 RX ADMIN — Medication 400 MILLIGRAM(S): at 19:20

## 2025-06-23 RX ADMIN — Medication 5 MILLIGRAM(S): at 20:13

## 2025-06-23 RX ADMIN — Medication 400 MILLIGRAM(S): at 20:43

## 2025-06-23 RX ADMIN — CLOTRIMAZOLE 1 APPLICATION(S): 1 CREAM TOPICAL at 09:00
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
## 2025-06-23 NOTE — BH DISCHARGE NOTE NURSING/SOCIAL WORK/PSYCH REHAB - NSDCPEEMAIL_GEN_ALL_CORE
[facility] for Tobacco Control email [email or web address]

## 2025-06-23 NOTE — BH INPATIENT PSYCHIATRY PROGRESS NOTE - NSBHATTESTCOMMENTATTENDFT_PSY_A_CORE
Interviewed the patient with the resident Dr. Issa. Patient presents with some mild paranoid preoccupation ("they are writing on my walls, they are stealing my cookies", etc.) which appears to be her baseline. Agree with the assessment and plan, submitting shelter applications.

## 2025-06-23 NOTE — BH DISCHARGE NOTE NURSING/SOCIAL WORK/PSYCH REHAB - FINANCIAL ASSISTANCE
[facility] provides services at a reduced cost to those who are determined to be eligible through [facility]’s financial assistance program. Information regarding [facility]’s financial assistance program can be found by going to [email or web address] or by calling [phone].

## 2025-06-23 NOTE — BH INPATIENT PSYCHIATRY PROGRESS NOTE - NSBHCHARTREVIEWVS_PSY_A_CORE FT
Vital Signs Last 24 Hrs  T(C): 37 (06-23-25 @ 08:47), Max: 37 (06-23-25 @ 08:47)  T(F): 98.6 (06-23-25 @ 08:47), Max: 98.6 (06-23-25 @ 08:47)  HR: 118 (06-23-25 @ 08:47) (103 - 118)  BP: 153/83 (06-23-25 @ 08:47) (141/82 - 153/83)  BP(mean): --  RR: --  SpO2: --

## 2025-06-23 NOTE — BH DISCHARGE NOTE NURSING/SOCIAL WORK/PSYCH REHAB - PATIENT PORTAL LINK FT
You can access the FollowMyHealth Patient Portal offered by [facility] by registering at the following website: [email or web address]. By joining [employer]’s FollowMyHealth portal, you will also be able to view your health information using other applications (apps) compatible with our system.
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
## 2025-06-23 NOTE — BH DISCHARGE NOTE NURSING/SOCIAL WORK/PSYCH REHAB - NSDCPEWEB_GEN_ALL_CORE
[facility] for Tobacco Control website --- [email or web address] website --- [email or web address]

## 2025-06-23 NOTE — BH INPATIENT PSYCHIATRY PROGRESS NOTE - NSBHMETABOLIC_PSY_ALL_CORE_FT
BMI: BMI (kg/m2): 29.8 (05-08-25 @ 14:42)  HbA1c: A1C with Estimated Average Glucose Result: 5.8 % (04-25-25 @ 04:21)    Glucose: POCT Blood Glucose.: 150 mg/dL (05-03-25 @ 08:30)    BP: 153/83 (06-23-25 @ 08:47) (137/80 - 153/83)Vital Signs Last 24 Hrs  T(C): 37 (06-23-25 @ 08:47), Max: 37 (06-23-25 @ 08:47)  T(F): 98.6 (06-23-25 @ 08:47), Max: 98.6 (06-23-25 @ 08:47)  HR: 118 (06-23-25 @ 08:47) (103 - 118)  BP: 153/83 (06-23-25 @ 08:47) (141/82 - 153/83)  BP(mean): --  RR: --  SpO2: --      Lipid Panel: Date/Time: 05-21-25 @ 08:00  Cholesterol, Serum: 140  LDL Cholesterol Calculated: 64  HDL Cholesterol, Serum: 55  Total Cholesterol/HDL Ration Measurement: --  Triglycerides, Serum: 121

## 2025-06-23 NOTE — BH INPATIENT PSYCHIATRY PROGRESS NOTE - NSBHATTESTBILLING_PSY_A_CORE
31838-Ogmcygncle OBS or IP - moderate complexity OR 35-49 mins
57783-Pesdekhjmq OBS or IP - moderate complexity OR 35-49 mins
64867-Gijuuedgvv OBS or IP - moderate complexity OR 35-49 mins
05995-Lbaghwmedx OBS or IP - moderate complexity OR 35-49 mins
65849-Hvtzfgxeel OBS or IP - moderate complexity OR 35-49 mins
60788-Bldzyrqrhq OBS or IP - moderate complexity OR 35-49 mins
41800-Eqeattgbwr OBS or IP - low complexity OR 25-34 mins
19373-Vxatavifsr OBS or IP - moderate complexity OR 35-49 mins
88037-Zczwxvuacb OBS or IP - moderate complexity OR 35-49 mins
12998-Ymgtejbjup OBS or IP - moderate complexity OR 35-49 mins
20814-Qgrdbfmtml OBS or IP - moderate complexity OR 35-49 mins
21242-Hnxbtyqtxf OBS or IP - moderate complexity OR 35-49 mins
11181-Peqnhctybb OBS or IP - moderate complexity OR 35-49 mins
19894-Gxbavrcwnq OBS or IP - moderate complexity OR 35-49 mins
56505-Pdwxadjxgl OBS or IP - moderate complexity OR 35-49 mins
90675-Yflkzzffqq OBS or IP - moderate complexity OR 35-49 mins
50931-Ovcoezgiar OBS or IP - moderate complexity OR 35-49 mins
87556-Corqnyupbw OBS or IP - moderate complexity OR 35-49 mins
87721-Gxzqyixgts OBS or IP - moderate complexity OR 35-49 mins
99600-Rifwwvyehj OBS or IP - moderate complexity OR 35-49 mins
31380-Ekvzrscdzr OBS or IP - moderate complexity OR 35-49 mins
76660-Sxhuazihhk OBS or IP - moderate complexity OR 35-49 mins
01211-Lptwgtwrpw OBS or IP - moderate complexity OR 35-49 mins
29057-Josffotfwu OBS or IP - moderate complexity OR 35-49 mins
44145-Aodpgawqio OBS or IP - moderate complexity OR 35-49 mins
30184-Gqwwofekoj OBS or IP - moderate complexity OR 35-49 mins
43532-Jhcoqraqtl OBS or IP - moderate complexity OR 35-49 mins
57484-Togifxttmc OBS or IP - moderate complexity OR 35-49 mins
68492-Qupmxhrkip OBS or IP - moderate complexity OR 35-49 mins
55021-Mrsuwjfjvt OBS or IP - moderate complexity OR 35-49 mins
08358-Trjemuirsv OBS or IP - low complexity OR 25-34 mins
43020-Wayjkbdmcp OBS or IP - moderate complexity OR 35-49 mins
82188-Uuttcobmlk OBS or IP - moderate complexity OR 35-49 mins
18293-Htjjpdkopn OBS or IP - moderate complexity OR 35-49 mins
60164-Vhedarnbqv OBS or IP - moderate complexity OR 35-49 mins
76888-Aifgvxhgrj OBS or IP - moderate complexity OR 35-49 mins

## 2025-06-23 NOTE — BH DISCHARGE NOTE NURSING/SOCIAL WORK/PSYCH REHAB - NSCDUDCCRISIS_PSY_A_CORE
[facility]  1 (563) [facility] ([phone])  Text "WELL" to 29423  Website: [email or web address]/.National Suicide Prevention Lifeline 9 (647) 695-4077/.  [employer]  1 (514) LIFENET (852-0781)/988 Suicide and Crisis Lifeline

## 2025-06-24 VITALS — SYSTOLIC BLOOD PRESSURE: 120 MMHG | HEART RATE: 103 BPM | DIASTOLIC BLOOD PRESSURE: 79 MMHG | TEMPERATURE: 99 F

## 2025-06-24 PROCEDURE — 99238 HOSP IP/OBS DSCHRG MGMT 30/<: CPT | Mod: GC

## 2025-06-24 RX ORDER — LIDOCAINE HYDROCHLORIDE 20 MG/ML
1 JELLY TOPICAL
Qty: 5 | Refills: 0
Start: 2025-06-24 | End: 2025-07-21

## 2025-06-24 RX ORDER — MELATONIN 5 MG
1 TABLET ORAL
Qty: 1 | Refills: 0
Start: 2025-06-24 | End: 2025-07-21

## 2025-06-24 RX ORDER — NICOTINE POLACRILEX 4 MG/1
1 GUM, CHEWING ORAL
Qty: 1 | Refills: 0
Start: 2025-06-24 | End: 2025-07-21

## 2025-06-24 RX ADMIN — HYDROXYZINE HYDROCHLORIDE 50 MILLIGRAM(S): 25 TABLET, FILM COATED ORAL at 03:21

## 2025-06-24 RX ADMIN — Medication 10 MILLIGRAM(S): at 08:47

## 2025-06-24 RX ADMIN — Medication 400 MILLIGRAM(S): at 09:25

## 2025-06-24 RX ADMIN — Medication 400 MILLIGRAM(S): at 08:47

## 2025-06-24 RX ADMIN — NICOTINE POLACRILEX 1 PATCH: 4 GUM, CHEWING ORAL at 08:48

## 2025-06-24 RX ADMIN — NICOTINE POLACRILEX 1 PATCH: 4 GUM, CHEWING ORAL at 07:46

## 2025-06-24 RX ADMIN — CLOTRIMAZOLE 1 APPLICATION(S): 1 CREAM TOPICAL at 09:43

## 2025-06-24 NOTE — BH INPATIENT PSYCHIATRY PROGRESS NOTE - NSTXSUBMISDATEEST_PSY_ALL_CORE
syncope
08-May-2025
09-May-2025
08-May-2025
09-May-2025
08-May-2025
09-May-2025
08-May-2025

## 2025-06-24 NOTE — BH INPATIENT PSYCHIATRY PROGRESS NOTE - NSBHMSEKNOWHOW_PSY_ALL_CORE
Current Events
Vocabulary
Vocabulary
Current Events
Vocabulary
Vocabulary
Current Events
Vocabulary
Current Events
Vocabulary
Current Events
Vocabulary
Current Events
Current Events
Vocabulary
Current Events
Current Events
Vocabulary
Current Events
Vocabulary
Current Events
Vocabulary
Vocabulary
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
## 2025-06-24 NOTE — BH INPATIENT PSYCHIATRY PROGRESS NOTE - NSBHMSEGAIT_PSY_A_CORE
Normal gait / station
Unable to assess
Unable to assess
Normal gait / station
Normal gait / station
Unable to assess
Normal gait / station
Unable to assess
Normal gait / station
Unable to assess
Unable to assess
Normal gait / station
Unable to assess
Normal gait / station
Unable to assess
Normal gait / station
Unable to assess
Unable to assess
Normal gait / station

## 2025-06-24 NOTE — BH INPATIENT PSYCHIATRY PROGRESS NOTE - NSBHMETABOLIC_PSY_ALL_CORE_FT
BMI: BMI (kg/m2): 29.8 (05-08-25 @ 14:42)  HbA1c: A1C with Estimated Average Glucose Result: 5.8 % (04-25-25 @ 04:21)    Glucose: POCT Blood Glucose.: 150 mg/dL (05-03-25 @ 08:30)    BP: 120/79 (06-24-25 @ 08:26) (120/79 - 153/83)Vital Signs Last 24 Hrs  T(C): 37.1 (06-24-25 @ 08:26), Max: 37.1 (06-24-25 @ 08:26)  T(F): 98.7 (06-24-25 @ 08:26), Max: 98.7 (06-24-25 @ 08:26)  HR: 103 (06-24-25 @ 08:26) (102 - 103)  BP: 120/79 (06-24-25 @ 08:26) (120/79 - 142/80)  BP(mean): --  RR: --  SpO2: --      Lipid Panel: Date/Time: 05-21-25 @ 08:00  Cholesterol, Serum: 140  LDL Cholesterol Calculated: 64  HDL Cholesterol, Serum: 55  Total Cholesterol/HDL Ration Measurement: --  Triglycerides, Serum: 121

## 2025-06-24 NOTE — BH INPATIENT PSYCHIATRY PROGRESS NOTE - NSBHMSEEYE_PSY_A_CORE
Poor
Fair
Good
Poor
Poor
Fair
Fair
Poor
Fair
Good
Poor
Poor
Fair
Fair
Poor
Fair
Good
Fair
Good
Fair
Poor
Fair
Poor
Fair
Fair
Poor

## 2025-06-24 NOTE — BH INPATIENT PSYCHIATRY PROGRESS NOTE - NSBHMSEINTELL_PSY_A_CORE
Average
Average
Unable to assess
Unable to assess
Average
Unable to assess
Unable to assess
Average
Unable to assess
Average
Unable to assess
Average
Unable to assess
Average
Unable to assess
Average
Unable to assess
Average
Unable to assess
Unable to assess
Average
Average
Unable to assess
Average
Average
Unable to assess

## 2025-06-24 NOTE — BH INPATIENT PSYCHIATRY PROGRESS NOTE - NSBHMSEBEHAV_PSY_A_CORE
Cooperative
Other
Other
Hostile/Other
Other
Cooperative
Cooperative
Other
Cooperative
Other
Cooperative
Other
Cooperative
Cooperative
Other
Cooperative
Cooperative
Other
Cooperative
Other
Cooperative
Other
Other

## 2025-06-24 NOTE — BH INPATIENT PSYCHIATRY PROGRESS NOTE - NSTXPSYCHOPROGRES_PSY_ALL_CORE
No Change
Improving
No Change
Improving
No Change
Improving
No Change
No Change
Improving
No Change
Worsening
Improving
No Change
Worsening
Improving
Worsening
Improving
No Change
Improving
No Change
Worsening
Improving
Improving
No Change
Improving
No Change
Worsening
Worsening

## 2025-06-24 NOTE — BH INPATIENT PSYCHIATRY PROGRESS NOTE - NSDCCRITERIA_PSY_ALL_CORE
When patient is able to function appropriately 

## 2025-06-24 NOTE — BH INPATIENT PSYCHIATRY PROGRESS NOTE - NSBHATTESTSTAFFAMEND_PSY_A_CORE
I have personally seen and examined this patient. I fully participated in the care of this patient. I have made amendments to the documentation where appropriate and otherwise agree with the history, physical exam, and plan as documented by the

## 2025-06-24 NOTE — BH INPATIENT PSYCHIATRY DISCHARGE NOTE - ATTENDING ATTESTATION STATEMENT
I have personally seen and examined the patient. I have collaborated with and supervised the [employer] Body: The suture ends were repeatedly re-tightened and re-clamped to achieve the desired tissue expansion.
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
## 2025-06-24 NOTE — BH INPATIENT PSYCHIATRY PROGRESS NOTE - NSBHPSYCHOLCOGABN_PSY_A_CORE
disoriented to situation

## 2025-06-24 NOTE — BH INPATIENT PSYCHIATRY PROGRESS NOTE - NSBHATTESTTYPEVISIT_PSY_A_CORE
Attending Only
Attending with Resident/Fellow/Student
Attending Only
Attending with Resident/Fellow/Student
Attending Only
Attending with Resident/Fellow/Student

## 2025-06-24 NOTE — BH INPATIENT PSYCHIATRY PROGRESS NOTE - NSBHMSEHYG_PSY_A_CORE
Poor
Fair
Poor
Fair
Poor
Poor
Fair
Fair
Poor
Fair
Fair
Poor
Poor
Fair
Fair
Poor
Poor
Fair
Fair
Poor
Fair
Poor
Fair
Poor
Fair
Poor

## 2025-06-24 NOTE — BH INPATIENT PSYCHIATRY PROGRESS NOTE - NSBHMSEATTEN_PSY_A_CORE
Impaired
Normal
Impaired
Normal
Impaired
Normal
Impaired
Normal
Impaired
Normal
Impaired
Impaired
Normal
Impaired
Normal
Normal
Impaired
Normal
Impaired
Normal
Normal
Impaired
Normal
Normal
Impaired
Impaired
Normal
Impaired
Normal
Impaired

## 2025-06-24 NOTE — BH INPATIENT PSYCHIATRY PROGRESS NOTE - NSBHMSEBODY_PSY_A_CORE
Average build
Overweight
Average build
Overweight
Average build
Average build
Overweight
Average build
Average build
Overweight
Average build

## 2025-06-24 NOTE — BH INPATIENT PSYCHIATRY PROGRESS NOTE - NSICDXBHPRIMARYDX_PSY_ALL_CORE
Schizoaffective disorder   F25.9  

## 2025-06-24 NOTE — BH INPATIENT PSYCHIATRY PROGRESS NOTE - NSTXPROBPSYCHO_PSY_ALL_CORE
PSYCHOTIC SYMPTOMS

## 2025-06-24 NOTE — BH INPATIENT PSYCHIATRY PROGRESS NOTE - NSBHMSEMUSCLE_PSY_A_CORE
Unable to assess
Other
Unable to assess
Other
Unable to assess
Other
Unable to assess
Other
Other
Unable to assess
Other
Unable to assess
Other
Unable to assess
Other
Other
Unable to assess
Other
Unable to assess
Other
Other

## 2025-06-24 NOTE — BH INPATIENT PSYCHIATRY PROGRESS NOTE - NSBHMSEAFFCONG_PSY_A_CORE
Congruent
Non-congruent
Congruent
Non-congruent
Congruent
Non-congruent
Congruent
Non-congruent

## 2025-06-24 NOTE — BH INPATIENT PSYCHIATRY PROGRESS NOTE - NSBHFUPINTERVALCCFT_PSY_A_CORE
"Can I switch back to the pills"
"  I am normal " 
"My daughter is stealing my identity" 
"Can you speed it up?
"I feel anxious"
"Everyone is watching me"
"you forgot to order me the shot"
"You are not helping me!!" 
"I thought it was only once a month"
"I'm ready for the shot"
"Pretty good"
"I don't need to take my medications"
"Hispanics are stealing credit cards" 
"they took my bathing products from me..."
"I don't need medications"
"I'm just a little nervous"
" There is nothing wrong with me" 
"I spoke to my daughter yesterday"
"People are stealing my computer from me"
"Risperidone is not the medication I'm supposed to be on". 
"Is there anything I can take instead of Risperdal?"
"I have cough with mucus coming out, I need oxygen therapy"
"I'm alright"
"my daughter is a drama queen"
"My mood is fine, a little agitated though"
"They play games here on the unit to keep you here longer."
"My mood is good"
"When am I being discharged?"
"Today is my manic Monday" 
"I've been trying to get my drink, a gin and tonic"
"I am screaming too much..." 
"I am concerned about identity theft" 
"I don't need medications"
"People keep coming into my room"
"Good" 
"Pretty good"
"You're not telling me my medication" 
"Don't talk to my daughter" 
"I am supposed to go home today".

## 2025-06-24 NOTE — BH INPATIENT PSYCHIATRY PROGRESS NOTE - NSBHMSERELATED_PSY_A_CORE
Poor
Fair
Poor
Other
Other
Poor
Poor
Fair
Other
Poor
Other
Other
Poor
Other
Poor
Poor
Other
Other
Poor
Other
Other
Poor
Poor
Other
Poor
Other
Poor
Other
Poor
Fair
Other
Other
Poor

## 2025-06-24 NOTE — BH INPATIENT PSYCHIATRY PROGRESS NOTE - NSTXPSYCHOMODTX_PSY_ALL_CORE
Referral sent  
Yes

## 2025-06-24 NOTE — BH INPATIENT PSYCHIATRY PROGRESS NOTE - NSTXPROBSUBMIS_PSY_ALL_CORE
SUBSTANCE MISUSE

## 2025-06-24 NOTE — BH INPATIENT PSYCHIATRY PROGRESS NOTE - NSBHMSEGROOM_PSY_A_CORE
Poor
Fair
Poor
Fair
Poor
Fair
Poor
Fair
Poor
Poor

## 2025-06-24 NOTE — BH INPATIENT PSYCHIATRY PROGRESS NOTE - NSBHMSETHTCONTENT_PSY_A_CORE
Delusions
Delusions/Homicidality
Delusions
Delusions/Homicidality
Delusions
Unremarkable
Delusions
Delusions
Unable to assess
Delusions
Delusions/Homicidality
Delusions/Homicidality

## 2025-06-24 NOTE — BH INPATIENT PSYCHIATRY PROGRESS NOTE - NSTXSUBMISGOAL_PSY_ALL_CORE
Be able to acknowledge that substance abuse is a problem

## 2025-06-24 NOTE — BH INPATIENT PSYCHIATRY PROGRESS NOTE - NSBHMSEREMMEM_PSY_A_CORE
Unable to assess
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
## 2025-06-24 NOTE — BH INPATIENT PSYCHIATRY PROGRESS NOTE - NSBHADMITIPREASONDETAILS_PSY_A_CORE FT
Unable to care for self. 

## 2025-06-24 NOTE — BH INPATIENT PSYCHIATRY PROGRESS NOTE - LEVEL OF CONSCIOUSNESS
Alert

## 2025-06-24 NOTE — BH INPATIENT PSYCHIATRY PROGRESS NOTE - MSE OPTIONS
Structured MSE

## 2025-06-24 NOTE — BH INPATIENT PSYCHIATRY DISCHARGE NOTE - NSDCMRMEDTOKEN_GEN_ALL_CORE_FT
ascorbic acid 1000 mg oral tablet: 1 tab(s) orally once a day  Depakote  mg oral tablet, extended release: 3 tab(s) orally once a day (at bedtime)  folic acid 1 mg oral tablet: 1 tab(s) orally once a day  gabapentin 300 mg oral tablet: 1 tab(s) orally once a day  losartan 25 mg oral tablet: 1 tab(s) orally once a day  risperiDONE 3 mg oral tablet: 1 tab(s) orally once a day (at bedtime)  thiamine 100 mg oral tablet: 1 tab(s) orally once a day   Depakote 250 mg oral delayed release tablet: 3 tab(s) orally once a day (at bedtime)  Lidocare Pain Relief Patch 4% topical film: Apply topically to affected area once a day  Melatonin 3 mg oral tablet: 1 tab(s) orally once a day (at bedtime) orally  Nicoderm C-Q Clear 21 mg/24 hr transdermal film, extended release: 1 patch transdermal once a day transdermal  ZyrTEC 10 mg oral tablet: 1 tab(s) orally once a day
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
## 2025-06-24 NOTE — BH INPATIENT PSYCHIATRY PROGRESS NOTE - NSTXPSYCHOGOAL_PSY_ALL_CORE
Will identify 2 coping skills that help mitigate hallucinations
Will identify 2 coping skills that assist with focus on reality
Will identify 2 coping skills that help mitigate hallucinations
Will identify 2 coping skills that assist with focus on reality
Will identify 2 coping skills that assist with focus on reality
Will identify 2 coping skills that help mitigate hallucinations
Will identify 2 coping skills that assist with focus on reality
Will identify 2 coping skills that help mitigate hallucinations
Will identify 2 coping skills that assist with focus on reality
Will identify 2 coping skills that assist with focus on reality
Will identify 2 coping skills that help mitigate hallucinations

## 2025-06-24 NOTE — BH INPATIENT PSYCHIATRY PROGRESS NOTE - PRN MEDS
MEDICATIONS  (PRN):  acetaminophen     Tablet .. 650 milliGRAM(s) Oral every 6 hours PRN Mild Pain (1 - 3), Moderate Pain (4 - 6)  artificial  tears Solution 1 Drop(s) Both EYES every 4 hours PRN dry eyes  guaifenesin/dextromethorphan Oral Liquid 10 milliLiter(s) Oral three times a day PRN cough  haloperidol     Tablet 5 milliGRAM(s) Oral every 6 hours PRN agitation  hydrOXYzine hydrochloride 50 milliGRAM(s) Oral every 6 hours PRN Anxiety  ibuprofen  Tablet. 400 milliGRAM(s) Oral every 8 hours PRN Moderate Pain (4 - 6)  loperamide 2 milliGRAM(s) Oral every 6 hours PRN loose stools  magnesium hydroxide Suspension 30 milliLiter(s) Oral daily PRN Constipation  nicotine - 21 mG/24Hr(s) Patch 1 Patch Transdermal daily PRN tobacco craving  
MEDICATIONS  (PRN):  acetaminophen     Tablet .. 650 milliGRAM(s) Oral every 6 hours PRN Mild Pain (1 - 3), Moderate Pain (4 - 6)  haloperidol     Tablet 5 milliGRAM(s) Oral every 6 hours PRN agitation  hydrOXYzine hydrochloride 50 milliGRAM(s) Oral every 6 hours PRN Anxiety  nicotine - 21 mG/24Hr(s) Patch 1 Patch Transdermal daily PRN tobacco craving  
MEDICATIONS  (PRN):  acetaminophen     Tablet .. 650 milliGRAM(s) Oral every 6 hours PRN Mild Pain (1 - 3), Moderate Pain (4 - 6)  artificial  tears Solution 1 Drop(s) Both EYES every 4 hours PRN dry eyes  haloperidol     Tablet 5 milliGRAM(s) Oral every 6 hours PRN agitation  hydrOXYzine hydrochloride 50 milliGRAM(s) Oral every 6 hours PRN Anxiety  ibuprofen  Tablet. 400 milliGRAM(s) Oral every 8 hours PRN Moderate Pain (4 - 6)  loperamide 2 milliGRAM(s) Oral every 6 hours PRN loose stools  magnesium hydroxide Suspension 30 milliLiter(s) Oral daily PRN Constipation  nicotine - 21 mG/24Hr(s) Patch 1 Patch Transdermal daily PRN tobacco craving  
MEDICATIONS  (PRN):  haloperidol     Tablet 5 milliGRAM(s) Oral every 6 hours PRN agitation  hydrOXYzine hydrochloride 50 milliGRAM(s) Oral every 6 hours PRN Anxiety  nicotine  Polacrilex Gum 4 milliGRAM(s) Oral every 2 hours PRN Smoking Cessation  
MEDICATIONS  (PRN):  haloperidol     Tablet 5 milliGRAM(s) Oral every 6 hours PRN agitation  hydrOXYzine hydrochloride 50 milliGRAM(s) Oral every 6 hours PRN Anxiety  nicotine  Polacrilex Gum 4 milliGRAM(s) Oral every 2 hours PRN Smoking Cessation  
MEDICATIONS  (PRN):  acetaminophen     Tablet .. 650 milliGRAM(s) Oral every 6 hours PRN Mild Pain (1 - 3), Moderate Pain (4 - 6)  artificial  tears Solution 1 Drop(s) Both EYES every 4 hours PRN dry eyes  haloperidol     Tablet 5 milliGRAM(s) Oral every 6 hours PRN agitation  hydrOXYzine hydrochloride 50 milliGRAM(s) Oral every 6 hours PRN Anxiety  ibuprofen  Tablet. 400 milliGRAM(s) Oral every 8 hours PRN Moderate Pain (4 - 6)  magnesium hydroxide Suspension 30 milliLiter(s) Oral daily PRN Constipation  nicotine - 21 mG/24Hr(s) Patch 1 Patch Transdermal daily PRN tobacco craving  
MEDICATIONS  (PRN):  acetaminophen     Tablet .. 650 milliGRAM(s) Oral every 6 hours PRN Mild Pain (1 - 3), Moderate Pain (4 - 6)  haloperidol     Tablet 5 milliGRAM(s) Oral every 6 hours PRN agitation  hydrOXYzine hydrochloride 50 milliGRAM(s) Oral every 6 hours PRN Anxiety  nicotine - 21 mG/24Hr(s) Patch 1 Patch Transdermal daily PRN tobacco craving  
MEDICATIONS  (PRN):  haloperidol     Tablet 5 milliGRAM(s) Oral every 6 hours PRN agitation  hydrOXYzine hydrochloride 50 milliGRAM(s) Oral every 6 hours PRN Anxiety  nicotine  Polacrilex Gum 4 milliGRAM(s) Oral every 2 hours PRN Smoking Cessation  
MEDICATIONS  (PRN):  acetaminophen     Tablet .. 650 milliGRAM(s) Oral every 6 hours PRN Mild Pain (1 - 3), Moderate Pain (4 - 6)  haloperidol     Tablet 5 milliGRAM(s) Oral every 6 hours PRN agitation  hydrOXYzine hydrochloride 50 milliGRAM(s) Oral every 6 hours PRN Anxiety  nicotine - 21 mG/24Hr(s) Patch 1 Patch Transdermal daily PRN tobacco craving  
MEDICATIONS  (PRN):  haloperidol     Tablet 5 milliGRAM(s) Oral every 6 hours PRN agitation  hydrOXYzine hydrochloride 50 milliGRAM(s) Oral every 6 hours PRN Anxiety  nicotine  Polacrilex Gum 4 milliGRAM(s) Oral every 2 hours PRN Smoking Cessation  
MEDICATIONS  (PRN):  acetaminophen     Tablet .. 650 milliGRAM(s) Oral every 6 hours PRN Mild Pain (1 - 3), Moderate Pain (4 - 6)  haloperidol     Tablet 5 milliGRAM(s) Oral every 6 hours PRN agitation  hydrOXYzine hydrochloride 50 milliGRAM(s) Oral every 6 hours PRN Anxiety  nicotine - 21 mG/24Hr(s) Patch 1 Patch Transdermal daily PRN tobacco craving  
MEDICATIONS  (PRN):  haloperidol     Tablet 5 milliGRAM(s) Oral every 6 hours PRN agitation  hydrOXYzine hydrochloride 50 milliGRAM(s) Oral every 6 hours PRN Anxiety  nicotine  Polacrilex Gum 4 milliGRAM(s) Oral every 2 hours PRN Smoking Cessation  
MEDICATIONS  (PRN):  acetaminophen     Tablet .. 650 milliGRAM(s) Oral every 6 hours PRN Mild Pain (1 - 3), Moderate Pain (4 - 6)  artificial  tears Solution 1 Drop(s) Both EYES every 4 hours PRN dry eyes  haloperidol     Tablet 5 milliGRAM(s) Oral every 6 hours PRN agitation  hydrOXYzine hydrochloride 50 milliGRAM(s) Oral every 6 hours PRN Anxiety  ibuprofen  Tablet. 400 milliGRAM(s) Oral every 8 hours PRN Moderate Pain (4 - 6)  magnesium hydroxide Suspension 30 milliLiter(s) Oral daily PRN Constipation  nicotine - 21 mG/24Hr(s) Patch 1 Patch Transdermal daily PRN tobacco craving  
MEDICATIONS  (PRN):  acetaminophen     Tablet .. 650 milliGRAM(s) Oral every 6 hours PRN Mild Pain (1 - 3), Moderate Pain (4 - 6)  haloperidol     Tablet 5 milliGRAM(s) Oral every 6 hours PRN agitation  hydrOXYzine hydrochloride 50 milliGRAM(s) Oral every 6 hours PRN Anxiety  nicotine - 21 mG/24Hr(s) Patch 1 Patch Transdermal daily PRN tobacco craving  
MEDICATIONS  (PRN):  haloperidol     Tablet 5 milliGRAM(s) Oral every 6 hours PRN agitation  hydrOXYzine hydrochloride 50 milliGRAM(s) Oral every 6 hours PRN Anxiety  nicotine  Polacrilex Gum 4 milliGRAM(s) Oral every 2 hours PRN Smoking Cessation  
MEDICATIONS  (PRN):  haloperidol     Tablet 5 milliGRAM(s) Oral every 6 hours PRN agitation  hydrOXYzine hydrochloride 50 milliGRAM(s) Oral every 6 hours PRN Anxiety  nicotine  Polacrilex Gum 4 milliGRAM(s) Oral every 2 hours PRN Smoking Cessation  
MEDICATIONS  (PRN):  haloperidol     Tablet 5 milliGRAM(s) Oral every 6 hours PRN agitation  hydrOXYzine hydrochloride 50 milliGRAM(s) Oral every 6 hours PRN Anxiety  nicotine  Polacrilex Gum 4 milliGRAM(s) Oral every 6 hours PRN Smoking Cessation  
MEDICATIONS  (PRN):  acetaminophen     Tablet .. 650 milliGRAM(s) Oral every 6 hours PRN Mild Pain (1 - 3), Moderate Pain (4 - 6)  artificial  tears Solution 1 Drop(s) Both EYES every 4 hours PRN dry eyes  guaifenesin/dextromethorphan Oral Liquid 10 milliLiter(s) Oral three times a day PRN cough  haloperidol     Tablet 5 milliGRAM(s) Oral every 6 hours PRN agitation  hydrOXYzine hydrochloride 50 milliGRAM(s) Oral every 6 hours PRN Anxiety  ibuprofen  Tablet. 400 milliGRAM(s) Oral every 8 hours PRN Moderate Pain (4 - 6)  loperamide 2 milliGRAM(s) Oral every 6 hours PRN loose stools  magnesium hydroxide Suspension 30 milliLiter(s) Oral daily PRN Constipation  nicotine - 21 mG/24Hr(s) Patch 1 Patch Transdermal daily PRN tobacco craving  
MEDICATIONS  (PRN):  acetaminophen     Tablet .. 650 milliGRAM(s) Oral every 6 hours PRN Mild Pain (1 - 3), Moderate Pain (4 - 6)  artificial  tears Solution 1 Drop(s) Both EYES every 4 hours PRN dry eyes  haloperidol     Tablet 5 milliGRAM(s) Oral every 6 hours PRN agitation  hydrOXYzine hydrochloride 50 milliGRAM(s) Oral every 6 hours PRN Anxiety  ibuprofen  Tablet. 400 milliGRAM(s) Oral every 8 hours PRN Moderate Pain (4 - 6)  loperamide 2 milliGRAM(s) Oral every 6 hours PRN loose stools  magnesium hydroxide Suspension 30 milliLiter(s) Oral daily PRN Constipation  nicotine - 21 mG/24Hr(s) Patch 1 Patch Transdermal daily PRN tobacco craving  
MEDICATIONS  (PRN):  haloperidol     Tablet 5 milliGRAM(s) Oral every 6 hours PRN agitation  hydrOXYzine hydrochloride 50 milliGRAM(s) Oral every 6 hours PRN Anxiety  nicotine  Polacrilex Gum 4 milliGRAM(s) Oral every 2 hours PRN Smoking Cessation  
MEDICATIONS  (PRN):  acetaminophen     Tablet .. 650 milliGRAM(s) Oral every 6 hours PRN Mild Pain (1 - 3), Moderate Pain (4 - 6)  artificial  tears Solution 1 Drop(s) Both EYES every 4 hours PRN dry eyes  haloperidol     Tablet 5 milliGRAM(s) Oral every 6 hours PRN agitation  hydrOXYzine hydrochloride 50 milliGRAM(s) Oral every 6 hours PRN Anxiety  ibuprofen  Tablet. 400 milliGRAM(s) Oral every 8 hours PRN Moderate Pain (4 - 6)  magnesium hydroxide Suspension 30 milliLiter(s) Oral daily PRN Constipation  nicotine - 21 mG/24Hr(s) Patch 1 Patch Transdermal daily PRN tobacco craving  
MEDICATIONS  (PRN):  acetaminophen     Tablet .. 650 milliGRAM(s) Oral every 6 hours PRN Mild Pain (1 - 3), Moderate Pain (4 - 6)  haloperidol     Tablet 5 milliGRAM(s) Oral every 6 hours PRN agitation  hydrOXYzine hydrochloride 50 milliGRAM(s) Oral every 6 hours PRN Anxiety  ibuprofen  Tablet. 400 milliGRAM(s) Oral every 8 hours PRN Moderate Pain (4 - 6)  nicotine - 21 mG/24Hr(s) Patch 1 Patch Transdermal daily PRN tobacco craving  
MEDICATIONS  (PRN):  acetaminophen     Tablet .. 650 milliGRAM(s) Oral every 6 hours PRN Mild Pain (1 - 3), Moderate Pain (4 - 6)  artificial  tears Solution 1 Drop(s) Both EYES every 4 hours PRN dry eyes  guaifenesin/dextromethorphan Oral Liquid 10 milliLiter(s) Oral three times a day PRN cough  haloperidol     Tablet 5 milliGRAM(s) Oral every 6 hours PRN agitation  hydrOXYzine hydrochloride 50 milliGRAM(s) Oral every 6 hours PRN Anxiety  ibuprofen  Tablet. 400 milliGRAM(s) Oral every 8 hours PRN Moderate Pain (4 - 6)  loperamide 2 milliGRAM(s) Oral every 6 hours PRN loose stools  magnesium hydroxide Suspension 30 milliLiter(s) Oral daily PRN Constipation  nicotine - 21 mG/24Hr(s) Patch 1 Patch Transdermal daily PRN tobacco craving  
MEDICATIONS  (PRN):  acetaminophen     Tablet .. 650 milliGRAM(s) Oral every 6 hours PRN Mild Pain (1 - 3), Moderate Pain (4 - 6)  haloperidol     Tablet 5 milliGRAM(s) Oral every 6 hours PRN agitation  hydrOXYzine hydrochloride 50 milliGRAM(s) Oral every 6 hours PRN Anxiety  ibuprofen  Tablet. 400 milliGRAM(s) Oral every 8 hours PRN Moderate Pain (4 - 6)  magnesium hydroxide Suspension 30 milliLiter(s) Oral daily PRN Constipation  nicotine - 21 mG/24Hr(s) Patch 1 Patch Transdermal daily PRN tobacco craving  
MEDICATIONS  (PRN):  acetaminophen     Tablet .. 650 milliGRAM(s) Oral every 6 hours PRN Mild Pain (1 - 3), Moderate Pain (4 - 6)  haloperidol     Tablet 5 milliGRAM(s) Oral every 6 hours PRN agitation  hydrOXYzine hydrochloride 50 milliGRAM(s) Oral every 6 hours PRN Anxiety  nicotine - 21 mG/24Hr(s) Patch 1 Patch Transdermal daily PRN tobacco craving  
MEDICATIONS  (PRN):  acetaminophen     Tablet .. 650 milliGRAM(s) Oral every 6 hours PRN Mild Pain (1 - 3), Moderate Pain (4 - 6)  haloperidol     Tablet 5 milliGRAM(s) Oral every 6 hours PRN agitation  hydrOXYzine hydrochloride 50 milliGRAM(s) Oral every 6 hours PRN Anxiety  ibuprofen  Tablet. 400 milliGRAM(s) Oral every 8 hours PRN Moderate Pain (4 - 6)  magnesium hydroxide Suspension 30 milliLiter(s) Oral daily PRN Constipation  nicotine - 21 mG/24Hr(s) Patch 1 Patch Transdermal daily PRN tobacco craving  
MEDICATIONS  (PRN):  haloperidol     Tablet 5 milliGRAM(s) Oral every 6 hours PRN agitation  hydrOXYzine hydrochloride 50 milliGRAM(s) Oral every 6 hours PRN Anxiety  nicotine  Polacrilex Gum 4 milliGRAM(s) Oral every 2 hours PRN Smoking Cessation  
MEDICATIONS  (PRN):  acetaminophen     Tablet .. 650 milliGRAM(s) Oral every 6 hours PRN Mild Pain (1 - 3), Moderate Pain (4 - 6)  artificial  tears Solution 1 Drop(s) Both EYES every 4 hours PRN dry eyes  haloperidol     Tablet 5 milliGRAM(s) Oral every 6 hours PRN agitation  hydrOXYzine hydrochloride 50 milliGRAM(s) Oral every 6 hours PRN Anxiety  ibuprofen  Tablet. 400 milliGRAM(s) Oral every 8 hours PRN Moderate Pain (4 - 6)  magnesium hydroxide Suspension 30 milliLiter(s) Oral daily PRN Constipation  nicotine - 21 mG/24Hr(s) Patch 1 Patch Transdermal daily PRN tobacco craving  
MEDICATIONS  (PRN):  haloperidol     Tablet 5 milliGRAM(s) Oral every 6 hours PRN agitation  hydrOXYzine hydrochloride 50 milliGRAM(s) Oral every 6 hours PRN Anxiety  nicotine  Polacrilex Gum 4 milliGRAM(s) Oral every 2 hours PRN Smoking Cessation  
MEDICATIONS  (PRN):  acetaminophen     Tablet .. 650 milliGRAM(s) Oral every 6 hours PRN Mild Pain (1 - 3), Moderate Pain (4 - 6)  haloperidol     Tablet 5 milliGRAM(s) Oral every 6 hours PRN agitation  hydrOXYzine hydrochloride 50 milliGRAM(s) Oral every 6 hours PRN Anxiety  nicotine - 21 mG/24Hr(s) Patch 1 Patch Transdermal daily PRN tobacco craving

## 2025-06-24 NOTE — BH INPATIENT PSYCHIATRY PROGRESS NOTE - NSBHCHARTREVIEWVS_PSY_A_CORE FT
Vital Signs Last 24 Hrs  T(C): 37.1 (06-24-25 @ 08:26), Max: 37.1 (06-24-25 @ 08:26)  T(F): 98.7 (06-24-25 @ 08:26), Max: 98.7 (06-24-25 @ 08:26)  HR: 103 (06-24-25 @ 08:26) (102 - 103)  BP: 120/79 (06-24-25 @ 08:26) (120/79 - 142/80)  BP(mean): --  RR: --  SpO2: --

## 2025-06-24 NOTE — BH INPATIENT PSYCHIATRY PROGRESS NOTE - NSTXSUBMISDATETRGT_PSY_ALL_CORE
15-May-2025
27-Jun-2025
15-May-2025
27-Jun-2025
15-May-2025
27-Jun-2025
15-May-2025

## 2025-06-24 NOTE — BH INPATIENT PSYCHIATRY PROGRESS NOTE - GENERAL APPEARANCE
Other
No deformities present
Other
No deformities present
No deformities present
Other
No deformities present
Other
Other

## 2025-06-24 NOTE — BH INPATIENT PSYCHIATRY PROGRESS NOTE - NSTXPSYCHODATENEW_PSY_ALL_CORE
04-Jul-2025
06-Jun-2025
04-Jul-2025
06-Jun-2025
04-Jul-2025
04-Jul-2025
06-Jun-2025
04-Jul-2025
06-Jun-2025
06-Jun-2025
04-Jul-2025
06-Jun-2025
06-Jun-2025
04-Jul-2025
06-Jun-2025
04-Jul-2025
06-Jun-2025
04-Jul-2025
06-Jun-2025
04-Jul-2025
06-Jun-2025

## 2025-06-24 NOTE — BH INPATIENT PSYCHIATRY PROGRESS NOTE - NSBHMSERECMEM_PSY_A_CORE
Impaired
Normal
Impaired
Normal
Normal
Impaired
Impaired
Normal
Impaired
Normal
Impaired
Normal
Impaired
Impaired
Normal
Impaired
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
## 2025-06-24 NOTE — BH INPATIENT PSYCHIATRY PROGRESS NOTE - NSBHMSEAFFRANGE_PSY_A_CORE
Constricted
Blunted
Constricted
Blunted
Blunted
Constricted
Constricted
Blunted
Constricted
Blunted
Constricted
Blunted
Constricted
Blunted
Blunted
Constricted
Constricted

## 2025-06-24 NOTE — BH INPATIENT PSYCHIATRY PROGRESS NOTE - NSBHCONSULTIPREASON_PSY_A_CORE
other reason

## 2025-06-24 NOTE — BH INPATIENT PSYCHIATRY PROGRESS NOTE - NSBHPSYCHOLCOGORIENT_PSY_A_CORE
Unable to assess
Not fully oriented...
Unable to assess
Not fully oriented...
Unable to assess
Not fully oriented...
Unable to assess
Not fully oriented...
Unable to assess
Not fully oriented...
Unable to assess
Not fully oriented...
Not fully oriented...
Unable to assess
Not fully oriented...
Unable to assess
Not fully oriented...
Not fully oriented...
Unable to assess
Not fully oriented...
Unable to assess
Not fully oriented...
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
## 2025-06-24 NOTE — BH INPATIENT PSYCHIATRY PROGRESS NOTE - NS ED BHA REVIEW OF ED CHART AVAILABLE LABS REVIEWED
Yes
Yes
None available
Yes
None available
Yes
None available
Yes
Yes
None available
Yes
None available
Yes
Yes
None available
Yes
Yes
None available
Yes
Yes
None available
Yes
None available
Yes

## 2025-06-24 NOTE — BH INPATIENT PSYCHIATRY PROGRESS NOTE - NSTXPSYCHODATETRGT_PSY_ALL_CORE
23-May-2025
30-Jun-2025
30-May-2025
30-Jun-2025
30-May-2025
30-May-2025
23-May-2025
30-Jun-2025
30-May-2025
23-May-2025
30-Jun-2025
20-Jun-2025
27-Jun-2025
23-May-2025
23-May-2025
30-Jun-2025
23-May-2025
23-May-2025
30-May-2025
30-Jun-2025
23-May-2025
30-Jun-2025
30-May-2025
30-May-2025
27-Jun-2025
23-May-2025
15-May-2025
27-Jun-2025
30-May-2025
30-May-2025
15-May-2025
15-May-2025
30-May-2025
30-Jun-2025
06-Jun-2025
27-Jun-2025
30-Jun-2025
30-May-2025
30-May-2025

## 2025-06-24 NOTE — BH INPATIENT PSYCHIATRY PROGRESS NOTE - NSTXPSYCHODATEEST_PSY_ALL_CORE
08-May-2025
09-Jun-2025
08-May-2025
09-May-2025
08-May-2025
09-May-2025
09-Jun-2025
08-May-2025
09-May-2025
08-May-2025
09-May-2025
09-Jun-2025
08-May-2025
09-May-2025
08-May-2025
09-May-2025
08-May-2025
09-May-2025
08-May-2025
09-May-2025
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
## 2025-06-24 NOTE — BH INPATIENT PSYCHIATRY PROGRESS NOTE - NSBHATTESTCOMMENTATTENDFT_PSY_A_CORE
Interviewed the patient with the resident Dr. Issa. Patient presents slightly anxious. She still thinks that her 'apartment is intact" and she can go back there. She denies si/hi. Agree with the assessment and plan, patient will be discharged to a shelter.

## 2025-06-24 NOTE — BH INPATIENT PSYCHIATRY PROGRESS NOTE - NSBHCHARTREVIEWINVESTIGATE_PSY_A_CORE FT
Ventricular Rate 93 BPM    Atrial Rate 93 BPM    P-R Interval 132 ms    QRS Duration 82 ms    Q-T Interval 346 ms    QTC Calculation(Bazett) 430 ms    P Axis 66 degrees    R Axis 89 degrees    T Axis 21 degrees    Diagnosis Line Normal sinus rhythmwith sinus arrhythmia  Normal ECG    Confirmed by NATHALY FERNANDEZ, AVE [id number]) on 5/7/2025 11:47:18 PM  
Ventricular Rate 93 BPM    Atrial Rate 93 BPM    P-R Interval 132 ms    QRS Duration 82 ms    Q-T Interval 346 ms    QTC Calculation(Bazett) 430 ms    P Axis 66 degrees    R Axis 89 degrees    T Axis 21 degrees    Diagnosis Line Normal sinus rhythmwith sinus arrhythmia  Normal ECG    Confirmed by NATHALY FERNANDZE, AVE [id number]) on 5/7/2025 11:47:18 PM  
Ventricular Rate 93 BPM    Atrial Rate 93 BPM    P-R Interval 132 ms    QRS Duration 82 ms    Q-T Interval 346 ms    QTC Calculation(Bazett) 430 ms    P Axis 66 degrees    R Axis 89 degrees    T Axis 21 degrees    Diagnosis Line Normal sinus rhythmwith sinus arrhythmia  Normal ECG    Confirmed by NATHALY FERNANDEZ, AVE [id number]) on 5/7/2025 11:47:18 PM  

## 2025-06-27 DIAGNOSIS — F25.9 SCHIZOAFFECTIVE DISORDER, UNSPECIFIED: ICD-10-CM

## 2025-06-27 DIAGNOSIS — Z87.828 PERSONAL HISTORY OF OTHER (HEALED) PHYSICAL INJURY AND TRAUMA: ICD-10-CM

## 2025-06-27 DIAGNOSIS — R05.9 COUGH, UNSPECIFIED: ICD-10-CM

## 2025-06-27 DIAGNOSIS — I10 ESSENTIAL (PRIMARY) HYPERTENSION: ICD-10-CM

## 2025-06-27 DIAGNOSIS — F10.20 ALCOHOL DEPENDENCE, UNCOMPLICATED: ICD-10-CM

## 2025-06-27 DIAGNOSIS — L60.2 ONYCHOGRYPHOSIS: ICD-10-CM

## 2025-06-27 DIAGNOSIS — L60.3 NAIL DYSTROPHY: ICD-10-CM

## 2025-06-27 DIAGNOSIS — Z59.01 SHELTERED HOMELESSNESS: ICD-10-CM

## 2025-06-27 DIAGNOSIS — B35.1 TINEA UNGUIUM: ICD-10-CM

## 2025-06-27 DIAGNOSIS — R45.850 HOMICIDAL IDEATIONS: ICD-10-CM

## 2025-06-27 DIAGNOSIS — K80.20 CALCULUS OF GALLBLADDER WITHOUT CHOLECYSTITIS WITHOUT OBSTRUCTION: ICD-10-CM

## 2025-06-27 DIAGNOSIS — L60.0 INGROWING NAIL: ICD-10-CM

## 2025-06-27 DIAGNOSIS — Z88.0 ALLERGY STATUS TO PENICILLIN: ICD-10-CM

## 2025-06-27 DIAGNOSIS — F17.200 NICOTINE DEPENDENCE, UNSPECIFIED, UNCOMPLICATED: ICD-10-CM

## 2025-06-27 SDOH — ECONOMIC STABILITY - HOUSING INSECURITY: SHELTERED HOMELESSNESS: Z59.01
